# Patient Record
Sex: FEMALE | Race: WHITE | Employment: OTHER
[De-identification: names, ages, dates, MRNs, and addresses within clinical notes are randomized per-mention and may not be internally consistent; named-entity substitution may affect disease eponyms.]

---

## 2017-02-01 ENCOUNTER — CARE COORDINATION (OUTPATIENT)
Dept: CARE COORDINATION | Facility: CLINIC | Age: 77
End: 2017-02-01

## 2017-03-17 ENCOUNTER — CARE COORDINATION (OUTPATIENT)
Dept: CARE COORDINATION | Age: 77
End: 2017-03-17

## 2017-04-28 ENCOUNTER — CARE COORDINATION (OUTPATIENT)
Dept: CARE COORDINATION | Age: 77
End: 2017-04-28

## 2017-05-31 ENCOUNTER — CARE COORDINATION (OUTPATIENT)
Dept: CARE COORDINATION | Age: 77
End: 2017-05-31

## 2017-07-06 ENCOUNTER — CARE COORDINATION (OUTPATIENT)
Dept: CARE COORDINATION | Age: 77
End: 2017-07-06

## 2017-07-27 ENCOUNTER — CARE COORDINATION (OUTPATIENT)
Dept: CARE COORDINATION | Age: 77
End: 2017-07-27

## 2017-08-03 ENCOUNTER — CARE COORDINATION (OUTPATIENT)
Dept: CARE COORDINATION | Age: 77
End: 2017-08-03

## 2017-08-18 ENCOUNTER — CARE COORDINATION (OUTPATIENT)
Dept: CARE COORDINATION | Age: 77
End: 2017-08-18

## 2017-08-18 RX ORDER — FUROSEMIDE 40 MG/1
40 TABLET ORAL DAILY
Status: ON HOLD | COMMUNITY
End: 2021-01-01

## 2017-08-18 RX ORDER — GLUCOSAMINE HCL/CHONDROITIN SU 500-400 MG
1 CAPSULE ORAL 4 TIMES DAILY
COMMUNITY
End: 2017-08-18 | Stop reason: SDUPTHER

## 2017-08-28 ENCOUNTER — CARE COORDINATION (OUTPATIENT)
Dept: CARE COORDINATION | Age: 77
End: 2017-08-28

## 2017-09-19 ENCOUNTER — CARE COORDINATION (OUTPATIENT)
Dept: CARE COORDINATION | Age: 77
End: 2017-09-19

## 2018-03-22 ENCOUNTER — CARE COORDINATION (OUTPATIENT)
Dept: CARE COORDINATION | Age: 78
End: 2018-03-22

## 2018-03-22 NOTE — CARE COORDINATION
at home,    what services are needed and what does she qualify for, or if she needs to go to assisted living. He is just concerned about after home health has discharged her what will happen because all family members work during the day, unable to keep up the schedule like they've been doing. I called and left a voicemail message asking the  at [facility] to call me back at [phone] to ask about  consult or if I should refer the [facility] care coordination LSW to follow her. CC Plan:   -Follow up with [facility]  -check on her specialists-cardiology and endocrinology, when next appts are. Diabetes Assessment    Medic Alert ID:  No  Meal Planning:  None   How often do you test your blood sugar?:  Meals, Bedtime   Do you have barriers with adherence to non-pharmacologic self-management interventions?  (Nutrition/Exercise/Self-Monitoring):  Yes (Comment: unable to exercise, may need diet instruction)   Have you ever had to go to the ED for symptoms of low blood sugar?:  No       Do you have hyperglycemia symptoms?:  No   Do you have hypoglycemia symptoms?:  No   Blood Sugar Monitoring Regimen:  Before Meals, At Bedtime   Blood Sugar Trends:  Other (Comment: improving)      ,   Congestive Heart Failure Assessment    Are you currently restricting fluids?:  No Restriction  Do you understand a low sodium diet?:  Yes  Do you understand how to read food labels?:  No  How many restaurant meals do you eat per week?:  0  Do you salt your food before tasting it?:  No         Symptoms:   CHF associated dyspnea on exertion: Pos, CHF associated leg swelling: Pos      Symptom course:  stable  Weight trend:  stable      and   General Assessment    Do you have any symptoms that are causing concern?:  Yes  Progression since Onset:  Gradually Improving  Reported Symptoms:  Cough           Ambulatory Care Coordination Assessment    Care Coordination Protocol  Program Enrollment:  Rising Risk  Referral

## 2018-03-23 ENCOUNTER — CARE COORDINATION (OUTPATIENT)
Dept: CARE COORDINATION | Age: 78
End: 2018-03-23

## 2018-03-23 RX ORDER — ALBUTEROL SULFATE 90 UG/1
2 AEROSOL, METERED RESPIRATORY (INHALATION) EVERY 6 HOURS PRN
Qty: 1 INHALER | Refills: 3 | Status: CANCELLED | OUTPATIENT
Start: 2018-03-23

## 2018-03-23 NOTE — CARE COORDINATION
A  from [facility] called and left a voicemail message on my phone giving me the name and phone number of Lori's home health nurse Florencio Jane. I called Florencio Jane today, gave her verbal order for  consult. She feels that Gopi Aldridge is not safe to live by herself, she is not retaining any of the education given to her regarding medications, she does not have the manual dexterity in her hands due to arthritis to use glucometer or check her blood sugars. She is very overwhelmed. Florencio Jane is wondering if speech therapy could be consulted to help with her memory and comprehension but Gopi [facility]ridge is refusing the physical therapy because there are already lots of people coming to her house every day which is also overwhelming and she might also refuse the speech therapy. She will visit Gopi Aldridge today and speak with the family, enter the SW consult. I called and spoke with Gop[facility] Aldridge. She is coughing frequently, having a hard time sleeping. She feels some chest congestion and is not able to cough any sputum up. She does not have a nebulizer yet. She finally got some sleep last night. She sleeps in a chair with her feet on a stool, is getting a lift chair delivered today so she can get out of it herself. Her bed is too high, she can't get in and out of it herself so sleeps in the chair. She is drinking and eating ok. Just feels exhausted from all the coughing. She stated she is trying hard to learn everything she can but she wants people to be more patient with her, it's only been a few weeks,  passed away March 1. She canceled her physical therapy appts because she is short of breath and just doesn't feel good but wants to start them next week. Denied any falls since being home, is using her walker. Her  drover her wherever she wanted to go. She doesn't drive and feels she will be stuck in the house because everyone is too busy and works to be able to take her places.  She is trying to do more around the house-dishes, laundry, etc.    I called and spoke with pharmacist at [facility], they do not provide nebulizers and the albuterol solution was denied by both the Medicare Part B and her [address] [place] [address] Street. She stated Part B usually covers the albuterol for diagnosis of chronic COPD or asthma, but not for temporary conditions like bronchospasm. She thinks the nebulizer order was sent to [facility] on [facility] since [facility] is closed. I called and left a message asking the rep at [facility] to call me back. I called and spoke with rep at [facility], she stated albuterol is only covered by Part B and I must speak to Medicare about it. I tried to call Medicare, was given the number for the client line, then told I need to call the provider line. I called the Medicare provider line and that department is closed for staff training, message instructed me to call back later, unable to leave a message. I called [facility] again and left another voicemail message on DME line for call back to me at [phone]. I sent notification of Medicare not covering the nebulizer and solution, order was changed to an inhaler with a spacer. I called [facility], patient's copay is $26.93. I left a voicemail message on son's phone informing him of new order and cost, also left a message on home health nurse Chiquita's phone regarding new order and that patient would need instructions and monitoring on correct use.     CC Plan:   -Follow up next week and assess bronchitis symptoms, use of inhaler, check blood sugars  -Communicate with home health agency regarding  consult    Future Appointments  Date Time Provider Jenni Fierro   4/6/2018 3:20 PM Dania Figueroa MD [facility] PCP

## 2018-03-27 ENCOUNTER — CARE COORDINATION (OUTPATIENT)
Dept: CARE COORDINATION | Age: 78
End: 2018-03-27

## 2018-03-27 RX ORDER — GUAIFENESIN 600 MG/1
1200 TABLET, EXTENDED RELEASE ORAL 2 TIMES DAILY
COMMUNITY
End: 2018-03-30 | Stop reason: ALTCHOICE

## 2018-03-27 RX ORDER — GUAIFENESIN 100 MG/5ML
200 SYRUP ORAL 3 TIMES DAILY PRN
COMMUNITY
End: 2018-03-30 | Stop reason: ALTCHOICE

## 2018-03-27 NOTE — CARE COORDINATION
adherence to non-pharmacologic self-management interventions? (Nutrition/Exercise/Self-Monitoring):  Yes (Comment: unable to exercise, may need diet instruction)   Have you ever had to go to the ED for symptoms of low blood sugar?:  No       Do you have hyperglycemia symptoms?:  No   Do you have hypoglycemia symptoms?:  No   Blood Sugar Monitoring Regimen:  Before Meals, At Bedtime   Blood Sugar Trends:  No Change      ,   Congestive Heart Failure Assessment    Are you currently restricting fluids?:  No Restriction  Do you understand a low sodium diet?:  Yes  Do you understand how to read food labels?:  No  How many restaurant meals do you eat per week?:  0  Do you salt your food before tasting it?:  No         Symptoms:   CHF associated dyspnea on exertion: Pos, CHF associated fatigue: Pos      Symptom course:  no change  Salt intake watch compared to last visit:  stable      and   General Assessment    Do you have any symptoms that are causing concern?:  Yes  Progression since Onset:  Unchanged  Reported Symptoms:  Cough, Fatigue, Pain (Comment: side pain from coughing)                 Care Coordination Interventions    Program Enrollment:  Rising Risk  Referral from Primary Care Provider:  Yes  Suggested Interventions and [address] [place] [address] Hwy:  Completed (Comment: [facility] care)  Meals on Wheels:  Declined  Medi Set or Pill Pack:  Declined  Pharmacist:  Declined  Physical Therapy:  Completed (Comment: [facility])  Social Work:  In Process (Comment:   [facility]  )  Zone Management Tools: In Process (Comment: DM)         Goals Addressed             Most Recent     Medication Management   On track (3/27/2018)             I will take my medication as directed. I will notify my provider of any problems with medications, like adverse effects or side effects. I will notify my provider/Care Coordinator if I am unable to afford my medications.   I will notify my provider pen INJECT 0-15 UNITS SUBCUTANEOUSLY FOUR TIMES DAILY BEFORE MEALS AND NIGHTLY 9/29/17  Yes Usama Raza MD   vitamin D (ERGOCALCIFEROL) 51651 UNITS CAPS capsule TAKE 1 CAPSULE BY MOUTH ONE TIME A WEEK  3/7/17  Yes Usama Raza MD   TRESIBA FLEXTOUCH 100 UNIT/ML SOPN 40 Units nightly Patient is taking 45 units nightly 1/12/17  Yes Historical Provider, MD   clopidogrel (PLAVIX) 75 MG tablet Take 1 tablet by mouth daily 2/13/17  Yes Usama Raza MD   metoprolol tartrate (LOPRESSOR) 25 MG tablet Take 0.5 tablets by mouth 2 times daily Indications: per Cardiologist's order 2/13/17  Yes Usama Raza MD   glipiZIDE (GLUCOTROL) 10 MG tablet Take 1 tablet by mouth every morning (before breakfast) 8/4/16  Yes Usama Raza MD   aspirin 81 MG chewable tablet Take 1 tablet by mouth daily 6/26/16  Yes Mahogany Fulton MD   B-D UF III MINI PEN NEEDLES 31G X 5 MM MISC INJECT SUBCUTANEOUSLY FOUR TIMES A DAY AS DIRECTED 2/19/18   Usama Raza MD   fluocinolone acetonide (SYNALAR) 0.01 % external solution Apply 1 each topically 2 times daily as needed 12/6/17   Historical Provider, MD   triamcinolone (KENALOG) 0.025 % ointment Apply 1 each topically 2 times daily as needed 12/7/17   Historical Provider, MD   clotrimazole (LOTRIMIN) 1 % cream Apply topically 2 times daily Apply topically 2 times daily.  8/21/17   Usama Raza MD   Glucose Blood (BLOOD GLUCOSE TEST STRIPS) STRP 1 strip by Does not apply route 4 times daily Indications: Mariam strips 8/21/17   Usama Raza MD   furosemide (LASIX) 40 MG tablet Take 40 mg by mouth daily Prescribed by Dr. Rochell Fabry Provider, MD [employer] (APPLE CIDER VINEGAR) TABS Take by mouth    Historical Provider, MD   loperamide (IMODIUM) 2 MG capsule Take 1 capsule by mouth as needed for Diarrhea 1/9/17   Usama Raza MD   Insulin Syringe-Needle U-100 (INSULIN SYRINGE .5CC/31GX5/16\") 31G

## 2018-03-29 ENCOUNTER — CARE COORDINATION (OUTPATIENT)
Dept: CARE COORDINATION | Age: 78
End: 2018-03-29

## 2018-03-30 ENCOUNTER — CARE COORDINATION (OUTPATIENT)
Dept: CARE COORDINATION | Age: 78
End: 2018-03-30

## 2018-03-30 RX ORDER — LORATADINE 10 MG/1
10 TABLET ORAL DAILY
Status: ON HOLD | COMMUNITY
End: 2018-11-01

## 2018-03-30 NOTE — CARE COORDINATION
Ambulatory Care Coordination Note  3/30/2018  CM Risk Score: 3  Varsha Mortality Risk Score: 19.51    ACC: Chandra Rodriguez RN    Summary Note: Spoke with Mio, she didn't know if she had started the antibiotics or not since her daughter in law Aleisha Barriga manages all of her medications. She assumes she did because she is feeling better today. Voice sounds stronger, less hoarse. She slept all night last night, is taking the cough syrup. She is getting a different lift chair so hopefully will be more comfortable and easier for her to get in and out of. She expectorated a small amount of phlegm this morning, brown, only the second time all week she's been able to cough it up. Encouraged fluids, she stated she is drinking a lot. Unable to review medications, she doesn't know what she takes, son is with her but he also doesn't know them, stated I would need to talk to Aleisha Barriga. Reminded her of PCP appt next week.     CC Plan:   - Follow next week to check on symptoms, blood sugars, medication management with family  - Check with home health next week to check progress with , future plans with family     Congestive Heart Failure Assessment    Are you currently restricting fluids?:  No Restriction  Do you understand a low sodium diet?:  Yes  Do you understand how to read food labels?:  No  How many restaurant meals do you eat per week?:  0  Do you salt your food before tasting it?:  No         Symptoms:   CHF associated dyspnea on exertion: Pos      Symptom course:  stable      and   General Assessment    Do you have any symptoms that are causing concern?:  Yes  Progression since Onset:  Gradually Improving  Reported Symptoms:  Cough             Care Coordination Interventions    Program Enrollment:  Rising Risk  Referral from Primary Care Provider:  Yes  Suggested Interventions and [address] [place] [address] Nikolayy:  Completed (Comment: [facility] health care)  Meals on Wheels:  Declined  Medi Set or Pill

## 2018-04-06 ENCOUNTER — CARE COORDINATION (OUTPATIENT)
Dept: CARE COORDINATION | Age: 78
End: 2018-04-06

## 2018-04-23 ENCOUNTER — CARE COORDINATION (OUTPATIENT)
Dept: CARE COORDINATION | Age: 78
End: 2018-04-23

## 2018-05-21 ENCOUNTER — CARE COORDINATION (OUTPATIENT)
Dept: CARE COORDINATION | Age: 78
End: 2018-05-21

## 2018-06-15 ENCOUNTER — CARE COORDINATION (OUTPATIENT)
Dept: CARE COORDINATION | Age: 78
End: 2018-06-15

## 2018-06-15 RX ORDER — GABAPENTIN 300 MG/1
300 CAPSULE ORAL DAILY
Status: ON HOLD | COMMUNITY
End: 2018-11-01 | Stop reason: ALTCHOICE

## 2018-07-17 ENCOUNTER — CARE COORDINATION (OUTPATIENT)
Dept: CARE COORDINATION | Age: 78
End: 2018-07-17

## 2018-07-17 NOTE — CARE COORDINATION
Insulin Syringe-Needle U-100 (INSULIN SYRINGE .5CC/31GX5/16\") 31G X 5/16\" 0.5 ML MISC 1 each by Does not apply route daily 11/4/16   Yanelis Matias MD   glipiZIDE (GLUCOTROL) 10 MG tablet Take 1 tablet by mouth every morning (before breakfast) 8/4/16   Yanelis Matias MD   aspirin 81 MG chewable tablet Take 1 tablet by mouth daily 6/26/16   Vivi Ashley MD       Future Appointments  Date Time Provider Jenni Fierro   8/8/2018 3:20 PM Yanelis Matias MD [facility] PCP

## 2018-08-08 ENCOUNTER — CARE COORDINATION (OUTPATIENT)
Dept: CARE COORDINATION | Age: 78
End: 2018-08-08

## 2018-08-08 ASSESSMENT — PATIENT HEALTH QUESTIONNAIRE - PHQ9: SUM OF ALL RESPONSES TO PHQ QUESTIONS 1-9: 3

## 2018-08-31 ENCOUNTER — CARE COORDINATION (OUTPATIENT)
Dept: CARE COORDINATION | Age: 78
End: 2018-08-31

## 2018-08-31 NOTE — CARE COORDINATION
Ambulatory Care Coordination Note  8/31/2018  CM Risk Score: 3  Varsha Mortality Risk Score: 19.51    ACC: Randy Waters RN    Summary Note: She is having a good day today. Has not had any falls since a month ago. Her left side is feeling better. She has been active going places, gets visits and calls from friends and her , her mood is better. Blood sugars are doing ok, didn't give any numbers. Recently saw the eye doctor. I called and requested a recent progress note from endocrinologist's office. Left lower leg swelling comes and goes, her breathing has not been labored lately. .  Diabetes Assessment    Medic Alert ID:  No  Meal Planning:  Avoidance of concentrated sweets   How often do you test your blood sugar?:  Meals, Bedtime   Do you have barriers with adherence to non-pharmacologic self-management interventions?  (Nutrition/Exercise/Self-Monitoring):  Yes (Comment: unable to exercise, may need diet instruction)   Have you ever had to go to the ED for symptoms of low blood sugar?:  No       Do you have hyperglycemia symptoms?:  No   Do you have hypoglycemia symptoms?:  No   Blood Sugar Monitoring Regimen:  Before Meals, At Bedtime   Blood Sugar Trends:  No Change      ,   Congestive Heart Failure Assessment    Are you currently restricting fluids?:  No Restriction  Do you understand a low sodium diet?:  Yes  Do you understand how to read food labels?:  No  How many restaurant meals do you eat per week?:  0  Do you salt your food before tasting it?:  No         Symptoms:   CHF associated dyspnea on exertion: Pos      Symptom course:  stable  Weight trend:  stable  Salt intake watch compared to last visit:  stable      and   General Assessment                   Care Coordination Interventions    Program Enrollment:  Rising Risk  Referral from Primary Care Provider:  Yes  Suggested Interventions and [facility] Claudio:  Completed (Comment: [facility] care- [facility])  Meals on

## 2018-10-10 ENCOUNTER — CARE COORDINATION (OUTPATIENT)
Dept: CARE COORDINATION | Age: 78
End: 2018-10-10

## 2018-10-10 NOTE — CARE COORDINATION
No  Progression since Onset:  Unchanged               Care Coordination Interventions    Program Enrollment:  Rising Risk  Referral from Primary Care Provider:  Yes  Suggested Interventions and Community Resources  Home Health Services:  Completed (Comment: [facility])  Meals on Wheels:  Declined  Medi Set or Pill Pack:  Declined  Pharmacist:  Declined  Physical Therapy:  Completed (Comment: [facility])  Social Work:  Completed (Comment:   [facility]  )  Other Services:  Completed (Comment: [facility])  Zone Management Tools:  Completed (Comment: DM)         Goals Addressed     None          Prior to Admission medications    Medication Sig Start Date End Date Taking? Authorizing Provider   spironolactone (ALDACTONE) 25 MG tablet TAKE 1 TABLET DAILY 7/25/18   Neeta Cunningham PA-C   simvastatin (ZOCOR) 20 MG tablet TAKE 1 TABLET NIGHTLY 7/25/18   Neeta Cunningham PA-C   gabapentin (NEURONTIN) 300 MG capsule Take 300 mg by mouth daily. Tangela Barajas     Historical Provider, MD   Multiple Vitamins-Minerals (MULTIVITAMIN WOMEN PO) Take by mouth    Historical Provider, MD   APIDRA SOLOSTAR 100 UNIT/ML injection pen INJECT 0 TO 15 UNITS SUBCUTANEOUSLY AT FOUR TIMES A DAY (BEFORE MEALS AND NIGHTLY) 6/4/18   Mauricio Liao MD   levETIRAcetam (KEPPRA) 1000 MG tablet Take 1 tablet by mouth 2 times daily 4/30/18 6/15/18  Mauricio Liao MD   Dextromethorphan-Guaifenesin (DELSYM COUGH/CHEST CONGEST DM PO) Take by mouth    Historical Provider, MD   loratadine (CLARITIN) 10 MG tablet Take 10 mg by mouth daily    Historical Provider, MD   albuterol sulfate HFA (PROVENTIL HFA) 108 (90 Base) MCG/ACT inhaler Inhale 2 puffs into the lungs every 6 hours as needed for Shortness of Breath (cough) 3/23/18   Mauricio Liao MD   B-D UF III MINI PEN NEEDLES 31G X 5 MM MISC INJECT SUBCUTANEOUSLY FOUR TIMES A DAY AS DIRECTED 2/19/18   Mauricio Liao MD   calcium aspirin 81 MG chewable tablet Take 1 tablet by mouth daily 6/26/16   Leopold Berber, MD       Future Appointments  Date Time Provider Jenni Sri   11/9/2018 2:00 PM [facility]itt [facility] [address] [place] [address]

## 2018-11-01 ENCOUNTER — HOSPITAL ENCOUNTER (OUTPATIENT)
Age: 78
Setting detail: OUTPATIENT SURGERY
Discharge: HOME | End: 2018-11-01
Attending: OPHTHALMOLOGY | Admitting: OPHTHALMOLOGY
Payer: MEDICARE

## 2018-11-01 VITALS
SYSTOLIC BLOOD PRESSURE: 105 MMHG | HEART RATE: 78 BPM | DIASTOLIC BLOOD PRESSURE: 50 MMHG | OXYGEN SATURATION: 96 % | HEIGHT: 61 IN | TEMPERATURE: 97.9 F | WEIGHT: 235 LBS | BODY MASS INDEX: 44.37 KG/M2 | RESPIRATION RATE: 16 BRPM

## 2018-11-01 PROBLEM — H25.12 AGE-RELATED NUCLEAR CATARACT, LEFT: Status: RESOLVED | Noted: 2018-11-01 | Resolved: 2018-11-01

## 2018-11-01 LAB
GLUCOSE BLD-MCNC: 115 MG/DL (ref 65–105)
GLUCOSE BLD-MCNC: 125 MG/DL (ref 65–105)

## 2018-11-01 PROCEDURE — 99153 MOD SED SAME PHYS/QHP EA: CPT | Performed by: OPHTHALMOLOGY

## 2018-11-01 PROCEDURE — 3600000012 HC SURGERY LEVEL 2 ADDTL 15MIN: Performed by: OPHTHALMOLOGY

## 2018-11-01 PROCEDURE — 2709999900 HC NON-CHARGEABLE SUPPLY: Performed by: OPHTHALMOLOGY

## 2018-11-01 PROCEDURE — 2580000003 HC RX 258: Performed by: OPHTHALMOLOGY

## 2018-11-01 PROCEDURE — 2500000003 HC RX 250 WO HCPCS: Performed by: OPHTHALMOLOGY

## 2018-11-01 PROCEDURE — 6370000000 HC RX 637 (ALT 250 FOR IP): Performed by: OPHTHALMOLOGY

## 2018-11-01 PROCEDURE — 3600000002 HC SURGERY LEVEL 2 BASE: Performed by: OPHTHALMOLOGY

## 2018-11-01 PROCEDURE — 6360000002 HC RX W HCPCS: Performed by: OPHTHALMOLOGY

## 2018-11-01 PROCEDURE — 99152 MOD SED SAME PHYS/QHP 5/>YRS: CPT | Performed by: OPHTHALMOLOGY

## 2018-11-01 PROCEDURE — 82947 ASSAY GLUCOSE BLOOD QUANT: CPT

## 2018-11-01 PROCEDURE — 7100000011 HC PHASE II RECOVERY - ADDTL 15 MIN: Performed by: OPHTHALMOLOGY

## 2018-11-01 PROCEDURE — 7100000010 HC PHASE II RECOVERY - FIRST 15 MIN: Performed by: OPHTHALMOLOGY

## 2018-11-01 PROCEDURE — V2632 POST CHMBR INTRAOCULAR LENS: HCPCS | Performed by: OPHTHALMOLOGY

## 2018-11-01 DEVICE — LENS IOL SN60WF 23.5D: Type: IMPLANTABLE DEVICE | Site: EYE | Status: FUNCTIONAL

## 2018-11-01 RX ORDER — SODIUM CHLORIDE, SODIUM LACTATE, POTASSIUM CHLORIDE, CALCIUM CHLORIDE 600; 310; 30; 20 MG/100ML; MG/100ML; MG/100ML; MG/100ML
INJECTION, SOLUTION INTRAVENOUS CONTINUOUS
Status: DISCONTINUED | OUTPATIENT
Start: 2018-11-01 | End: 2018-11-01 | Stop reason: HOSPADM

## 2018-11-01 RX ORDER — LIDOCAINE HYDROCHLORIDE 10 MG/ML
INJECTION, SOLUTION INFILTRATION; PERINEURAL PRN
Status: DISCONTINUED | OUTPATIENT
Start: 2018-11-01 | End: 2018-11-01 | Stop reason: HOSPADM

## 2018-11-01 RX ORDER — LORAZEPAM 1 MG/1
1 TABLET ORAL ONCE
Status: COMPLETED | OUTPATIENT
Start: 2018-11-01 | End: 2018-11-01

## 2018-11-01 RX ORDER — MIDAZOLAM HYDROCHLORIDE 1 MG/ML
INJECTION INTRAMUSCULAR; INTRAVENOUS PRN
Status: DISCONTINUED | OUTPATIENT
Start: 2018-11-01 | End: 2018-11-01 | Stop reason: HOSPADM

## 2018-11-01 RX ORDER — NEOMYCIN SULFATE, POLYMYXIN B SULFATE, AND DEXAMETHASONE 3.5; 10000; 1 MG/G; [USP'U]/G; MG/G
OINTMENT OPHTHALMIC PRN
Status: DISCONTINUED | OUTPATIENT
Start: 2018-11-01 | End: 2018-11-01 | Stop reason: HOSPADM

## 2018-11-01 RX ORDER — TOBRAMYCIN 3 MG/ML
1 SOLUTION/ DROPS OPHTHALMIC EVERY 5 MIN PRN
Status: COMPLETED | OUTPATIENT
Start: 2018-11-01 | End: 2018-11-01

## 2018-11-01 RX ORDER — BALANCED SALT SOLUTION ENRICHED WITH BICARBONATE, DEXTROSE, AND GLUTATHIONE
KIT INTRAOCULAR PRN
Status: DISCONTINUED | OUTPATIENT
Start: 2018-11-01 | End: 2018-11-01 | Stop reason: HOSPADM

## 2018-11-01 RX ORDER — DIFLUPREDNATE 0.5 MG/ML
EMULSION OPHTHALMIC PRN
Status: DISCONTINUED | OUTPATIENT
Start: 2018-11-01 | End: 2018-11-01 | Stop reason: HOSPADM

## 2018-11-01 RX ORDER — BUPIVACAINE HYDROCHLORIDE 5 MG/ML
INJECTION, SOLUTION EPIDURAL; INTRACAUDAL PRN
Status: DISCONTINUED | OUTPATIENT
Start: 2018-11-01 | End: 2018-11-01 | Stop reason: HOSPADM

## 2018-11-01 RX ORDER — PHENYLEPHRINE HCL 2.5 %
1 DROPS OPHTHALMIC (EYE) EVERY 5 MIN PRN
Status: COMPLETED | OUTPATIENT
Start: 2018-11-01 | End: 2018-11-01

## 2018-11-01 RX ORDER — CYCLOPENTOLATE HYDROCHLORIDE 10 MG/ML
1 SOLUTION/ DROPS OPHTHALMIC EVERY 5 MIN PRN
Status: COMPLETED | OUTPATIENT
Start: 2018-11-01 | End: 2018-11-01

## 2018-11-01 RX ORDER — KETOROLAC TROMETHAMINE 5 MG/ML
1 SOLUTION OPHTHALMIC EVERY 5 MIN PRN
Status: COMPLETED | OUTPATIENT
Start: 2018-11-01 | End: 2018-11-01

## 2018-11-01 RX ORDER — BUPIVACAINE HYDROCHLORIDE 5 MG/ML
1 INJECTION, SOLUTION EPIDURAL; INTRACAUDAL SEE ADMIN INSTRUCTIONS
Status: DISCONTINUED | OUTPATIENT
Start: 2018-11-01 | End: 2018-11-01 | Stop reason: HOSPADM

## 2018-11-01 RX ORDER — TOBRAMYCIN 3 MG/ML
SOLUTION/ DROPS OPHTHALMIC PRN
Status: DISCONTINUED | OUTPATIENT
Start: 2018-11-01 | End: 2018-11-01 | Stop reason: HOSPADM

## 2018-11-01 RX ADMIN — KETOROLAC TROMETHAMINE 1 DROP: 5 SOLUTION OPHTHALMIC at 08:07

## 2018-11-01 RX ADMIN — KETOROLAC TROMETHAMINE 1 DROP: 5 SOLUTION OPHTHALMIC at 07:59

## 2018-11-01 RX ADMIN — LORAZEPAM 1 MG: 1 TABLET ORAL at 07:56

## 2018-11-01 RX ADMIN — BUPIVACAINE HYDROCHLORIDE 5 MG: 5 INJECTION, SOLUTION EPIDURAL; INTRACAUDAL; PERINEURAL at 08:06

## 2018-11-01 RX ADMIN — CYCLOPENTOLATE HYDROCHLORIDE 1 DROP: 10 SOLUTION/ DROPS OPHTHALMIC at 07:59

## 2018-11-01 RX ADMIN — CYCLOPENTOLATE HYDROCHLORIDE 1 DROP: 10 SOLUTION/ DROPS OPHTHALMIC at 08:07

## 2018-11-01 RX ADMIN — TOBRAMYCIN 1 DROP: 3 SOLUTION/ DROPS OPHTHALMIC at 08:07

## 2018-11-01 RX ADMIN — BUPIVACAINE HYDROCHLORIDE 5 MG: 5 INJECTION, SOLUTION EPIDURAL; INTRACAUDAL; PERINEURAL at 07:58

## 2018-11-01 RX ADMIN — PHENYLEPHRINE HYDROCHLORIDE 1 DROP: 25 SOLUTION/ DROPS OPHTHALMIC at 08:07

## 2018-11-01 RX ADMIN — TOBRAMYCIN 1 DROP: 3 SOLUTION/ DROPS OPHTHALMIC at 07:59

## 2018-11-01 RX ADMIN — SODIUM CHLORIDE, POTASSIUM CHLORIDE, SODIUM LACTATE AND CALCIUM CHLORIDE: 600; 310; 30; 20 INJECTION, SOLUTION INTRAVENOUS at 08:08

## 2018-11-01 RX ADMIN — PHENYLEPHRINE HYDROCHLORIDE 1 DROP: 25 SOLUTION/ DROPS OPHTHALMIC at 07:59

## 2018-11-01 ASSESSMENT — PAIN SCALES - GENERAL
PAINLEVEL_OUTOF10: 0

## 2018-11-01 ASSESSMENT — PAIN - FUNCTIONAL ASSESSMENT: PAIN_FUNCTIONAL_ASSESSMENT: 0-10

## 2019-01-28 RX ORDER — LEVETIRACETAM 1000 MG/1
1000 TABLET ORAL 2 TIMES DAILY
Qty: 60 TABLET | Refills: 3 | Status: SHIPPED | OUTPATIENT
Start: 2019-01-28 | End: 2019-01-31 | Stop reason: SDUPTHER

## 2019-01-31 RX ORDER — LEVETIRACETAM 1000 MG/1
1000 TABLET ORAL 2 TIMES DAILY
Qty: 180 TABLET | Refills: 3 | Status: SHIPPED | OUTPATIENT
Start: 2019-01-31 | End: 2019-11-15

## 2019-02-22 ENCOUNTER — OFFICE VISIT (OUTPATIENT)
Dept: PRIMARY CARE CLINIC | Age: 79
End: 2019-02-22
Payer: MEDICARE

## 2019-02-22 VITALS
OXYGEN SATURATION: 96 % | HEIGHT: 61 IN | WEIGHT: 242.4 LBS | SYSTOLIC BLOOD PRESSURE: 104 MMHG | HEART RATE: 88 BPM | DIASTOLIC BLOOD PRESSURE: 60 MMHG | BODY MASS INDEX: 45.76 KG/M2

## 2019-02-22 DIAGNOSIS — E66.01 MORBID OBESITY WITH BMI OF 45.0-49.9, ADULT (HCC): ICD-10-CM

## 2019-02-22 DIAGNOSIS — K64.9 HEMORRHOIDS, UNSPECIFIED HEMORRHOID TYPE: ICD-10-CM

## 2019-02-22 DIAGNOSIS — E11.43 DIABETIC AUTONOMIC NEUROPATHY ASSOCIATED WITH TYPE 2 DIABETES MELLITUS (HCC): ICD-10-CM

## 2019-02-22 DIAGNOSIS — G40.909 SEIZURE DISORDER (HCC): ICD-10-CM

## 2019-02-22 DIAGNOSIS — I50.32 CHRONIC DIASTOLIC CHF (CONGESTIVE HEART FAILURE) (HCC): Primary | ICD-10-CM

## 2019-02-22 DIAGNOSIS — E11.65 UNCONTROLLED TYPE 2 DIABETES MELLITUS WITH HYPERGLYCEMIA (HCC): ICD-10-CM

## 2019-02-22 PROCEDURE — 99214 OFFICE O/P EST MOD 30 MIN: CPT | Performed by: FAMILY MEDICINE

## 2019-02-22 ASSESSMENT — PATIENT HEALTH QUESTIONNAIRE - PHQ9
SUM OF ALL RESPONSES TO PHQ QUESTIONS 1-9: 2
SUM OF ALL RESPONSES TO PHQ QUESTIONS 1-9: 2
2. FEELING DOWN, DEPRESSED OR HOPELESS: 1
SUM OF ALL RESPONSES TO PHQ9 QUESTIONS 1 & 2: 2
1. LITTLE INTEREST OR PLEASURE IN DOING THINGS: 1

## 2019-02-22 ASSESSMENT — ENCOUNTER SYMPTOMS
BLOOD IN STOOL: 0
CONSTIPATION: 1
RECTAL PAIN: 0
DIARRHEA: 1
ANAL BLEEDING: 1
SHORTNESS OF BREATH: 1

## 2019-03-05 ENCOUNTER — HOSPITAL ENCOUNTER (OUTPATIENT)
Age: 79
Setting detail: OUTPATIENT SURGERY
Discharge: HOME | End: 2019-03-05
Attending: OPHTHALMOLOGY | Admitting: OPHTHALMOLOGY
Payer: MEDICARE

## 2019-03-05 VITALS
OXYGEN SATURATION: 97 % | RESPIRATION RATE: 16 BRPM | HEART RATE: 72 BPM | SYSTOLIC BLOOD PRESSURE: 110 MMHG | DIASTOLIC BLOOD PRESSURE: 62 MMHG | TEMPERATURE: 97.5 F | WEIGHT: 242 LBS | HEIGHT: 60 IN | BODY MASS INDEX: 47.51 KG/M2

## 2019-03-05 PROBLEM — H25.11 AGE-RELATED NUCLEAR CATARACT, RIGHT: Status: RESOLVED | Noted: 2019-03-05 | Resolved: 2019-03-05

## 2019-03-05 LAB — GLUCOSE BLD-MCNC: 105 MG/DL (ref 65–105)

## 2019-03-05 PROCEDURE — 6370000000 HC RX 637 (ALT 250 FOR IP): Performed by: OPHTHALMOLOGY

## 2019-03-05 PROCEDURE — 7100000010 HC PHASE II RECOVERY - FIRST 15 MIN: Performed by: OPHTHALMOLOGY

## 2019-03-05 PROCEDURE — 99153 MOD SED SAME PHYS/QHP EA: CPT | Performed by: OPHTHALMOLOGY

## 2019-03-05 PROCEDURE — 6360000002 HC RX W HCPCS: Performed by: OPHTHALMOLOGY

## 2019-03-05 PROCEDURE — 3600000002 HC SURGERY LEVEL 2 BASE: Performed by: OPHTHALMOLOGY

## 2019-03-05 PROCEDURE — 82947 ASSAY GLUCOSE BLOOD QUANT: CPT

## 2019-03-05 PROCEDURE — 2500000003 HC RX 250 WO HCPCS: Performed by: OPHTHALMOLOGY

## 2019-03-05 PROCEDURE — 2580000003 HC RX 258: Performed by: OPHTHALMOLOGY

## 2019-03-05 PROCEDURE — 99152 MOD SED SAME PHYS/QHP 5/>YRS: CPT | Performed by: OPHTHALMOLOGY

## 2019-03-05 PROCEDURE — 3600000012 HC SURGERY LEVEL 2 ADDTL 15MIN: Performed by: OPHTHALMOLOGY

## 2019-03-05 PROCEDURE — 7100000011 HC PHASE II RECOVERY - ADDTL 15 MIN: Performed by: OPHTHALMOLOGY

## 2019-03-05 PROCEDURE — 2709999900 HC NON-CHARGEABLE SUPPLY: Performed by: OPHTHALMOLOGY

## 2019-03-05 PROCEDURE — V2632 POST CHMBR INTRAOCULAR LENS: HCPCS | Performed by: OPHTHALMOLOGY

## 2019-03-05 DEVICE — LENS IOL SN60WF 23.5D: Type: IMPLANTABLE DEVICE | Site: EYE | Status: FUNCTIONAL

## 2019-03-05 RX ORDER — LORAZEPAM 1 MG/1
1 TABLET ORAL ONCE
Status: COMPLETED | OUTPATIENT
Start: 2019-03-05 | End: 2019-03-05

## 2019-03-05 RX ORDER — TIMOLOL MALEATE 5 MG/ML
SOLUTION/ DROPS OPHTHALMIC PRN
Status: DISCONTINUED | OUTPATIENT
Start: 2019-03-05 | End: 2019-03-05 | Stop reason: ALTCHOICE

## 2019-03-05 RX ORDER — TOBRAMYCIN 3 MG/ML
1 SOLUTION/ DROPS OPHTHALMIC EVERY 5 MIN PRN
Status: COMPLETED | OUTPATIENT
Start: 2019-03-05 | End: 2019-03-05

## 2019-03-05 RX ORDER — DIFLUPREDNATE 0.5 MG/ML
EMULSION OPHTHALMIC PRN
Status: DISCONTINUED | OUTPATIENT
Start: 2019-03-05 | End: 2019-03-05 | Stop reason: ALTCHOICE

## 2019-03-05 RX ORDER — KETOROLAC TROMETHAMINE 5 MG/ML
1 SOLUTION OPHTHALMIC EVERY 5 MIN PRN
Status: COMPLETED | OUTPATIENT
Start: 2019-03-05 | End: 2019-03-05

## 2019-03-05 RX ORDER — BUPIVACAINE HYDROCHLORIDE 5 MG/ML
1 INJECTION, SOLUTION EPIDURAL; INTRACAUDAL SEE ADMIN INSTRUCTIONS
Status: DISCONTINUED | OUTPATIENT
Start: 2019-03-05 | End: 2019-03-05 | Stop reason: HOSPADM

## 2019-03-05 RX ORDER — NEOMYCIN SULFATE, POLYMYXIN B SULFATE, AND DEXAMETHASONE 3.5; 10000; 1 MG/G; [USP'U]/G; MG/G
OINTMENT OPHTHALMIC PRN
Status: DISCONTINUED | OUTPATIENT
Start: 2019-03-05 | End: 2019-03-05 | Stop reason: ALTCHOICE

## 2019-03-05 RX ORDER — BUPIVACAINE HYDROCHLORIDE 5 MG/ML
INJECTION, SOLUTION EPIDURAL; INTRACAUDAL PRN
Status: DISCONTINUED | OUTPATIENT
Start: 2019-03-05 | End: 2019-03-05 | Stop reason: ALTCHOICE

## 2019-03-05 RX ORDER — MIDAZOLAM HYDROCHLORIDE 1 MG/ML
INJECTION INTRAMUSCULAR; INTRAVENOUS PRN
Status: DISCONTINUED | OUTPATIENT
Start: 2019-03-05 | End: 2019-03-05 | Stop reason: ALTCHOICE

## 2019-03-05 RX ORDER — TOBRAMYCIN 3 MG/ML
SOLUTION/ DROPS OPHTHALMIC PRN
Status: DISCONTINUED | OUTPATIENT
Start: 2019-03-05 | End: 2019-03-05 | Stop reason: ALTCHOICE

## 2019-03-05 RX ORDER — SODIUM CHLORIDE, SODIUM LACTATE, POTASSIUM CHLORIDE, CALCIUM CHLORIDE 600; 310; 30; 20 MG/100ML; MG/100ML; MG/100ML; MG/100ML
INJECTION, SOLUTION INTRAVENOUS CONTINUOUS
Status: DISCONTINUED | OUTPATIENT
Start: 2019-03-05 | End: 2019-03-05 | Stop reason: HOSPADM

## 2019-03-05 RX ORDER — PHENYLEPHRINE HCL 2.5 %
1 DROPS OPHTHALMIC (EYE) EVERY 5 MIN PRN
Status: COMPLETED | OUTPATIENT
Start: 2019-03-05 | End: 2019-03-05

## 2019-03-05 RX ORDER — CYCLOPENTOLATE HYDROCHLORIDE 10 MG/ML
1 SOLUTION/ DROPS OPHTHALMIC EVERY 5 MIN PRN
Status: COMPLETED | OUTPATIENT
Start: 2019-03-05 | End: 2019-03-05

## 2019-03-05 RX ORDER — LIDOCAINE HYDROCHLORIDE 10 MG/ML
INJECTION, SOLUTION INFILTRATION; PERINEURAL PRN
Status: DISCONTINUED | OUTPATIENT
Start: 2019-03-05 | End: 2019-03-05 | Stop reason: ALTCHOICE

## 2019-03-05 RX ORDER — BALANCED SALT SOLUTION ENRICHED WITH BICARBONATE, DEXTROSE, AND GLUTATHIONE
KIT INTRAOCULAR PRN
Status: DISCONTINUED | OUTPATIENT
Start: 2019-03-05 | End: 2019-03-05 | Stop reason: ALTCHOICE

## 2019-03-05 RX ADMIN — KETOROLAC TROMETHAMINE 1 DROP: 5 SOLUTION/ DROPS OPHTHALMIC at 06:53

## 2019-03-05 RX ADMIN — CYCLOPENTOLATE HYDROCHLORIDE 1 DROP: 10 SOLUTION/ DROPS OPHTHALMIC at 06:53

## 2019-03-05 RX ADMIN — TOBRAMYCIN 1 DROP: 3 SOLUTION/ DROPS OPHTHALMIC at 06:55

## 2019-03-05 RX ADMIN — BUPIVACAINE HYDROCHLORIDE 5 MG: 5 INJECTION, SOLUTION EPIDURAL; INTRACAUDAL; PERINEURAL at 06:54

## 2019-03-05 RX ADMIN — CYCLOPENTOLATE HYDROCHLORIDE 1 DROP: 10 SOLUTION/ DROPS OPHTHALMIC at 07:06

## 2019-03-05 RX ADMIN — LORAZEPAM 1 MG: 1 TABLET ORAL at 06:53

## 2019-03-05 RX ADMIN — PHENYLEPHRINE HYDROCHLORIDE 1 DROP: 25 SOLUTION/ DROPS OPHTHALMIC at 07:06

## 2019-03-05 RX ADMIN — TOBRAMYCIN 1 DROP: 3 SOLUTION/ DROPS OPHTHALMIC at 07:06

## 2019-03-05 RX ADMIN — KETOROLAC TROMETHAMINE 1 DROP: 5 SOLUTION/ DROPS OPHTHALMIC at 07:06

## 2019-03-05 RX ADMIN — PHENYLEPHRINE HYDROCHLORIDE 1 DROP: 25 SOLUTION/ DROPS OPHTHALMIC at 06:53

## 2019-03-05 RX ADMIN — SODIUM CHLORIDE, POTASSIUM CHLORIDE, SODIUM LACTATE AND CALCIUM CHLORIDE: 600; 310; 30; 20 INJECTION, SOLUTION INTRAVENOUS at 07:16

## 2019-03-05 RX ADMIN — BUPIVACAINE HYDROCHLORIDE 5 MG: 5 INJECTION, SOLUTION EPIDURAL; INTRACAUDAL; PERINEURAL at 07:06

## 2019-03-05 ASSESSMENT — PAIN - FUNCTIONAL ASSESSMENT: PAIN_FUNCTIONAL_ASSESSMENT: 0-10

## 2019-03-05 ASSESSMENT — PAIN SCALES - GENERAL
PAINLEVEL_OUTOF10: 0
PAINLEVEL_OUTOF10: 5
PAINLEVEL_OUTOF10: 5

## 2019-03-05 ASSESSMENT — PAIN DESCRIPTION - DESCRIPTORS: DESCRIPTORS: ACHING

## 2019-03-11 LAB
ALBUMIN SERPL-MCNC: NORMAL G/DL
ALP BLD-CCNC: NORMAL U/L
ALT SERPL-CCNC: NORMAL U/L
ANION GAP SERPL CALCULATED.3IONS-SCNC: NORMAL MMOL/L
AST SERPL-CCNC: NORMAL U/L
AVERAGE GLUCOSE: 150
BILIRUB SERPL-MCNC: NORMAL MG/DL
BUN BLDV-MCNC: NORMAL MG/DL
CALCIUM SERPL-MCNC: NORMAL MG/DL
CHLORIDE BLD-SCNC: NORMAL MMOL/L
CHOLESTEROL, TOTAL: 157 MG/DL
CHOLESTEROL/HDL RATIO: 3.8
CO2: NORMAL
CREAT SERPL-MCNC: NORMAL MG/DL
CREATININE, URINE: NORMAL
GFR CALCULATED: NORMAL
GLUCOSE BLD-MCNC: 84 MG/DL
HBA1C MFR BLD: 6.8 %
HDLC SERPL-MCNC: 41 MG/DL (ref 35–70)
LDL CHOLESTEROL CALCULATED: 88 MG/DL (ref 0–160)
MICROALBUMIN/CREAT 24H UR: NORMAL MG/G{CREAT}
MICROALBUMIN/CREAT UR-RTO: NORMAL
POTASSIUM SERPL-SCNC: NORMAL MMOL/L
SODIUM BLD-SCNC: NORMAL MMOL/L
TOTAL PROTEIN: NORMAL
TRIGL SERPL-MCNC: 137 MG/DL
VLDLC SERPL CALC-MCNC: 27 MG/DL

## 2019-03-20 RX ORDER — SPIRONOLACTONE 25 MG/1
TABLET ORAL
Qty: 90 TABLET | Refills: 3 | Status: SHIPPED | OUTPATIENT
Start: 2019-03-20 | End: 2020-03-23

## 2019-03-28 RX ORDER — INSULIN GLULISINE 100 [IU]/ML
INJECTION, SOLUTION SUBCUTANEOUS
Qty: 15 ML | Refills: 3 | Status: SHIPPED | OUTPATIENT
Start: 2019-03-28 | End: 2020-02-04 | Stop reason: DRUGHIGH

## 2019-05-08 ENCOUNTER — TELEPHONE (OUTPATIENT)
Dept: PRIMARY CARE CLINIC | Age: 79
End: 2019-05-08

## 2019-05-08 RX ORDER — CLOTRIMAZOLE 1 %
CREAM (GRAM) TOPICAL 2 TIMES DAILY
Qty: 180 G | Refills: 1 | Status: SHIPPED | OUTPATIENT
Start: 2019-05-08 | End: 2019-11-15 | Stop reason: ALTCHOICE

## 2019-05-08 NOTE — TELEPHONE ENCOUNTER
Message received from QUARTER[place] for something to help patient who has  \"heat rash\" under breasts.   [phone]

## 2019-05-08 NOTE — TELEPHONE ENCOUNTER
Belkis England called back, pt gets rash every summer, usually gets something to dry it up. Tristen Caitlyn on [address].

## 2019-06-07 ENCOUNTER — TELEPHONE (OUTPATIENT)
Dept: PRIMARY CARE CLINIC | Age: 79
End: 2019-06-07

## 2019-06-07 DIAGNOSIS — E11.43 DIABETIC AUTONOMIC NEUROPATHY ASSOCIATED WITH TYPE 2 DIABETES MELLITUS (HCC): ICD-10-CM

## 2019-06-07 DIAGNOSIS — E11.65 UNCONTROLLED TYPE 2 DIABETES MELLITUS WITH HYPERGLYCEMIA (HCC): ICD-10-CM

## 2019-06-07 DIAGNOSIS — I61.9 HEMORRHAGIC CEREBROVASCULAR ACCIDENT (CVA) (HCC): Primary | ICD-10-CM

## 2019-06-20 ENCOUNTER — HOSPITAL ENCOUNTER (OUTPATIENT)
Age: 79
Discharge: HOME | End: 2019-06-20
Payer: MEDICARE

## 2019-06-20 LAB
ALBUMIN SERPL-MCNC: 3.9 G/DL (ref 3.5–5.2)
ALBUMIN/GLOBULIN RATIO: ABNORMAL (ref 1–2.5)
ALP BLD-CCNC: 86 U/L (ref 35–104)
ALT SERPL-CCNC: 14 U/L (ref 5–33)
ANION GAP SERPL CALCULATED.3IONS-SCNC: 11 MMOL/L (ref 9–17)
AST SERPL-CCNC: 18 U/L
BILIRUB SERPL-MCNC: 0.29 MG/DL (ref 0.3–1.2)
BUN BLDV-MCNC: 31 MG/DL (ref 8–23)
BUN/CREAT BLD: ABNORMAL (ref 9–20)
CALCIUM SERPL-MCNC: 10 MG/DL (ref 8.6–10.4)
CHLORIDE BLD-SCNC: 101 MMOL/L (ref 98–107)
CO2: 31 MMOL/L (ref 20–31)
CREAT SERPL-MCNC: 1.16 MG/DL (ref 0.5–0.9)
GFR AFRICAN AMERICAN: 54 ML/MIN
GFR NON-AFRICAN AMERICAN: 45 ML/MIN
GFR SERPL CREATININE-BSD FRML MDRD: ABNORMAL ML/MIN/{1.73_M2}
GFR SERPL CREATININE-BSD FRML MDRD: ABNORMAL ML/MIN/{1.73_M2}
GLUCOSE BLD-MCNC: 118 MG/DL (ref 70–99)
POTASSIUM SERPL-SCNC: 4.7 MMOL/L (ref 3.7–5.3)
SODIUM BLD-SCNC: 143 MMOL/L (ref 135–144)
TOTAL PROTEIN: 7.8 G/DL (ref 6.4–8.3)

## 2019-06-20 PROCEDURE — 36415 COLL VENOUS BLD VENIPUNCTURE: CPT

## 2019-06-20 PROCEDURE — 80053 COMPREHEN METABOLIC PANEL: CPT

## 2019-07-01 DIAGNOSIS — E78.5 HYPERLIPIDEMIA, UNSPECIFIED HYPERLIPIDEMIA TYPE: ICD-10-CM

## 2019-07-01 RX ORDER — SIMVASTATIN 20 MG
TABLET ORAL
Qty: 90 TABLET | Refills: 3 | Status: SHIPPED | OUTPATIENT
Start: 2019-07-01 | End: 2020-06-29

## 2019-07-17 ENCOUNTER — APPOINTMENT (OUTPATIENT)
Dept: GENERAL RADIOLOGY | Age: 79
End: 2019-07-17
Payer: MEDICARE

## 2019-07-17 ENCOUNTER — APPOINTMENT (OUTPATIENT)
Dept: CT IMAGING | Age: 79
End: 2019-07-17
Payer: MEDICARE

## 2019-07-17 ENCOUNTER — HOSPITAL ENCOUNTER (EMERGENCY)
Age: 79
Discharge: HOME | End: 2019-07-17
Attending: EMERGENCY MEDICINE
Payer: MEDICARE

## 2019-07-17 VITALS
WEIGHT: 240 LBS | TEMPERATURE: 97.7 F | DIASTOLIC BLOOD PRESSURE: 55 MMHG | HEART RATE: 78 BPM | BODY MASS INDEX: 44.16 KG/M2 | RESPIRATION RATE: 18 BRPM | OXYGEN SATURATION: 98 % | SYSTOLIC BLOOD PRESSURE: 91 MMHG | HEIGHT: 62 IN

## 2019-07-17 DIAGNOSIS — M79.605 BILATERAL LEG PAIN: Primary | ICD-10-CM

## 2019-07-17 DIAGNOSIS — M79.604 BILATERAL LEG PAIN: Primary | ICD-10-CM
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
## 2019-07-17 LAB
-: NORMAL
ABSOLUTE EOS #: 0.1 K/UL (ref 0–0.4)
ABSOLUTE IMMATURE GRANULOCYTE: ABNORMAL K/UL (ref 0–0.3)
ABSOLUTE LYMPH #: 1.6 K/UL (ref 1–4.8)
ABSOLUTE MONO #: 0.7 K/UL (ref 0.1–1.3)
ANION GAP SERPL CALCULATED.3IONS-SCNC: 12 MMOL/L (ref 9–17)
BASOPHILS # BLD: 1 % (ref 0–2)
BASOPHILS ABSOLUTE: 0.1 K/UL (ref 0–0.2)
BUN BLDV-MCNC: 26 MG/DL (ref 8–23)
BUN/CREAT BLD: ABNORMAL (ref 9–20)
CALCIUM SERPL-MCNC: 9.7 MG/DL (ref 8.6–10.4)
CHLORIDE BLD-SCNC: 101 MMOL/L (ref 98–107)
CO2: 27 MMOL/L (ref 20–31)
CREAT SERPL-MCNC: 0.98 MG/DL (ref 0.5–0.9)
DIFFERENTIAL TYPE: ABNORMAL
EOSINOPHILS RELATIVE PERCENT: 1 % (ref 0–4)
GFR AFRICAN AMERICAN: >60 ML/MIN
GFR NON-AFRICAN AMERICAN: 55 ML/MIN
GFR SERPL CREATININE-BSD FRML MDRD: ABNORMAL ML/MIN/{1.73_M2}
GFR SERPL CREATININE-BSD FRML MDRD: ABNORMAL ML/MIN/{1.73_M2}
GLUCOSE BLD-MCNC: 135 MG/DL (ref 65–105)
GLUCOSE BLD-MCNC: 146 MG/DL (ref 70–99)
HCT VFR BLD CALC: 40.8 % (ref 36–46)
HEMOGLOBIN: 13.4 G/DL (ref 12–16)
IMMATURE GRANULOCYTES: ABNORMAL %
LYMPHOCYTES # BLD: 17 % (ref 24–44)
MCH RBC QN AUTO: 31.9 PG (ref 26–34)
MCHC RBC AUTO-ENTMCNC: 32.9 G/DL (ref 31–37)
MCV RBC AUTO: 97 FL (ref 80–100)
MONOCYTES # BLD: 7 % (ref 1–7)
NRBC AUTOMATED: ABNORMAL PER 100 WBC
PDW BLD-RTO: 12.9 % (ref 11.5–14.9)
PLATELET # BLD: 205 K/UL (ref 150–450)
PLATELET ESTIMATE: ABNORMAL
PMV BLD AUTO: 8.5 FL (ref 6–12)
POTASSIUM SERPL-SCNC: 4.6 MMOL/L (ref 3.7–5.3)
RBC # BLD: 4.2 M/UL (ref 4–5.2)
RBC # BLD: ABNORMAL 10*6/UL
REASON FOR REJECTION: NORMAL
SEG NEUTROPHILS: 74 % (ref 36–66)
SEGMENTED NEUTROPHILS ABSOLUTE COUNT: 7.3 K/UL (ref 1.3–9.1)
SODIUM BLD-SCNC: 140 MMOL/L (ref 135–144)
WBC # BLD: 9.8 K/UL (ref 3.5–11)
WBC # BLD: ABNORMAL 10*3/UL
ZZ NTE CLEAN UP: ORDERED TEST: NORMAL
ZZ NTE WITH NAME CLEAN UP: SPECIMEN SOURCE: NORMAL

## 2019-07-17 PROCEDURE — 36415 COLL VENOUS BLD VENIPUNCTURE: CPT

## 2019-07-17 PROCEDURE — 73590 X-RAY EXAM OF LOWER LEG: CPT

## 2019-07-17 PROCEDURE — 82947 ASSAY GLUCOSE BLOOD QUANT: CPT

## 2019-07-17 PROCEDURE — 72170 X-RAY EXAM OF PELVIS: CPT

## 2019-07-17 PROCEDURE — 6370000000 HC RX 637 (ALT 250 FOR IP): Performed by: NURSE PRACTITIONER

## 2019-07-17 PROCEDURE — 80048 BASIC METABOLIC PNL TOTAL CA: CPT

## 2019-07-17 PROCEDURE — 99284 EMERGENCY DEPT VISIT MOD MDM: CPT

## 2019-07-17 PROCEDURE — 73562 X-RAY EXAM OF KNEE 3: CPT

## 2019-07-17 PROCEDURE — 72125 CT NECK SPINE W/O DYE: CPT

## 2019-07-17 PROCEDURE — 85025 COMPLETE CBC W/AUTO DIFF WBC: CPT

## 2019-07-17 PROCEDURE — 70450 CT HEAD/BRAIN W/O DYE: CPT

## 2019-07-17 RX ORDER — ACETAMINOPHEN 325 MG/1
650 TABLET ORAL ONCE
Status: COMPLETED | OUTPATIENT
Start: 2019-07-17 | End: 2019-07-17

## 2019-07-17 RX ADMIN — ACETAMINOPHEN 650 MG: 325 TABLET, FILM COATED ORAL at 09:33

## 2019-07-17 ASSESSMENT — ENCOUNTER SYMPTOMS
SHORTNESS OF BREATH: 0
COUGH: 0
NAUSEA: 0
BOWEL INCONTINENCE: 0
VOMITING: 0
TROUBLE SWALLOWING: 0

## 2019-07-17 ASSESSMENT — PAIN SCALES - GENERAL: PAINLEVEL_OUTOF10: 6

## 2019-07-17 ASSESSMENT — PAIN DESCRIPTION - LOCATION: LOCATION: LEG

## 2019-07-17 ASSESSMENT — PAIN DESCRIPTION - PAIN TYPE: TYPE: ACUTE PAIN

## 2019-07-17 ASSESSMENT — PAIN DESCRIPTION - DESCRIPTORS: DESCRIPTORS: ACHING

## 2019-07-17 ASSESSMENT — PAIN DESCRIPTION - ORIENTATION: ORIENTATION: LEFT

## 2019-07-17 NOTE — ED PROVIDER NOTES
Disp-90 tablet, R-3      loperamide (IMODIUM) 2 MG capsule Take 1 capsule by mouth as needed for Diarrhea, Disp-30 capsule, R-1      Insulin Syringe-Needle U-100 (INSULIN SYRINGE .5CC/31GX5/16\") 31G X 5/16\" 0.5 ML MISC DAILY Starting 11/4/2016, Until Discontinued, Disp-100 each, R-3, Print      glipiZIDE (GLUCOTROL) 10 MG tablet Take 1 tablet by mouth every morning (before breakfast), Disp-90 tablet, R-3      aspirin 81 MG chewable tablet Take 1 tablet by mouth daily, Disp-30 tablet, R-3             ALLERGIES     is allergic to lantus [insulin glargine]; metformin and related; prednisone; sertraline; eggs or egg-derived products; novolog [insulin aspart]; and zinc.    FAMILY HISTORY     She indicated that the status of her mother is unknown. She indicated that the status of her father is unknown. She indicated that the status of her brother is unknown. SOCIAL HISTORY      reports that she has never smoked. She has never used smokeless tobacco. She reports that she does not drink alcohol or use drugs. PHYSICAL EXAM     INITIAL VITALS: BP (!) 91/55   Pulse 78   Temp 97.7 °F (36.5 °C) (Oral)   Resp 18   Ht 5' 2\" (1.575 m)   Wt 240 lb (108.9 kg)   SpO2 98%   BMI 43.90 kg/m²      Physical Exam   Constitutional: She is oriented to person, place, and time. She appears well-developed and well-nourished. No distress. HENT:   Head: Normocephalic and atraumatic. Right Ear: External ear normal.   Left Ear: External ear normal.   Nose: Nose normal.   Eyes: Pupils are equal, round, and reactive to light. Right eye exhibits no discharge. Left eye exhibits no discharge. No scleral icterus. Neck: Normal range of motion. No tracheal deviation present. Cardiovascular: Normal rate and regular rhythm. Pulmonary/Chest: Effort normal and breath sounds normal. No stridor. No respiratory distress. Abdominal: Soft. Bowel sounds are normal. There is no tenderness. Musculoskeletal: Normal range of motion.  She limits   POC GLUCOSE FINGERSTICK - Abnormal; Notable for the following components:    POC Glucose 135 (*)     All other components within normal limits   SPECIMEN REJECTION         EMERGENCY DEPARTMENT COURSE:   Vitals:    Vitals:    07/17/19 0906 07/17/19 1030 07/17/19 1100 07/17/19 1200   BP: (!) 117/48 (!) 109/58 118/62 (!) 91/55   Pulse: 78      Resp: 18      Temp: 97.7 °F (36.5 °C)      TempSrc: Oral      SpO2: 94% (!) 86% 97% 98%   Weight: 240 lb (108.9 kg)      Height: 5' 2\" (1.575 m)          Reviewed. The patient was given the followingmedications while in the emergency department:  Orders Placed This Encounter   Medications    acetaminophen (TYLENOL) tablet 650 mg         CONSULTS:  None    PROCEDURES:  Procedures    FINAL IMPRESSION      1. Bilateral leg pain    2.  Fall, initial encounter          DISPOSITION/PLAN   DISPOSITION Decision To Discharge 07/17/2019 01:01:39 PM      PATIENT REFERRED TO:  Adarsh Hernandeznhaven [address] [place], [place] [id number]    Schedule an appointment as soon as possible for a visit       [address] [place] [id number] [phone]    As needed, If symptoms worsen      DISCHARGE MEDICATIONS:  Discharge Medication List as of 7/17/2019  1:02 PM          (Please note that portions of this note were completed with a voice recognition program.  Efforts were made to edit the dictations but occasionally wordsare mis-transcribed.)    FELICE Arriola CNP, APRN - CNP  07/17/19 8438

## 2019-07-22 ENCOUNTER — APPOINTMENT (OUTPATIENT)
Dept: GENERAL RADIOLOGY | Age: 79
End: 2019-07-22
Payer: MEDICARE

## 2019-07-22 ENCOUNTER — HOSPITAL ENCOUNTER (EMERGENCY)
Age: 79
Discharge: HOME | End: 2019-07-22
Attending: EMERGENCY MEDICINE
Payer: MEDICARE

## 2019-07-22 VITALS
TEMPERATURE: 97.4 F | OXYGEN SATURATION: 97 % | DIASTOLIC BLOOD PRESSURE: 73 MMHG | HEART RATE: 64 BPM | BODY MASS INDEX: 44.37 KG/M2 | SYSTOLIC BLOOD PRESSURE: 99 MMHG | RESPIRATION RATE: 16 BRPM | WEIGHT: 235 LBS | HEIGHT: 61 IN

## 2019-07-22 DIAGNOSIS — L03.116 CELLULITIS OF LEFT LOWER EXTREMITY: Primary | ICD-10-CM

## 2019-07-22 LAB
-: NORMAL
ABSOLUTE EOS #: 0.1 K/UL (ref 0–0.4)
ABSOLUTE IMMATURE GRANULOCYTE: ABNORMAL K/UL (ref 0–0.3)
ABSOLUTE LYMPH #: 1.8 K/UL (ref 1–4.8)
ABSOLUTE MONO #: 0.9 K/UL (ref 0.1–1.3)
ANION GAP SERPL CALCULATED.3IONS-SCNC: 12 MMOL/L (ref 9–17)
BASOPHILS # BLD: 1 % (ref 0–2)
BASOPHILS ABSOLUTE: 0.1 K/UL (ref 0–0.2)
BUN BLDV-MCNC: 30 MG/DL (ref 8–23)
BUN/CREAT BLD: ABNORMAL (ref 9–20)
CALCIUM SERPL-MCNC: 9.7 MG/DL (ref 8.6–10.4)
CHLORIDE BLD-SCNC: 100 MMOL/L (ref 98–107)
CO2: 28 MMOL/L (ref 20–31)
CREAT SERPL-MCNC: 1.04 MG/DL (ref 0.5–0.9)
DIFFERENTIAL TYPE: ABNORMAL
EOSINOPHILS RELATIVE PERCENT: 1 % (ref 0–4)
GFR AFRICAN AMERICAN: >60 ML/MIN
GFR NON-AFRICAN AMERICAN: 51 ML/MIN
GFR SERPL CREATININE-BSD FRML MDRD: ABNORMAL ML/MIN/{1.73_M2}
GFR SERPL CREATININE-BSD FRML MDRD: ABNORMAL ML/MIN/{1.73_M2}
GLUCOSE BLD-MCNC: 103 MG/DL (ref 70–99)
HCT VFR BLD CALC: 39.1 % (ref 36–46)
HEMOGLOBIN: 12.9 G/DL (ref 12–16)
IMMATURE GRANULOCYTES: ABNORMAL %
LYMPHOCYTES # BLD: 16 % (ref 24–44)
MCH RBC QN AUTO: 31.7 PG (ref 26–34)
MCHC RBC AUTO-ENTMCNC: 32.9 G/DL (ref 31–37)
MCV RBC AUTO: 96.3 FL (ref 80–100)
MONOCYTES # BLD: 8 % (ref 1–7)
NRBC AUTOMATED: ABNORMAL PER 100 WBC
PDW BLD-RTO: 13.3 % (ref 11.5–14.9)
PLATELET # BLD: 200 K/UL (ref 150–450)
PLATELET ESTIMATE: ABNORMAL
PMV BLD AUTO: 9.1 FL (ref 6–12)
POTASSIUM SERPL-SCNC: 3.8 MMOL/L (ref 3.7–5.3)
RBC # BLD: 4.06 M/UL (ref 4–5.2)
RBC # BLD: ABNORMAL 10*6/UL
REASON FOR REJECTION: NORMAL
SEG NEUTROPHILS: 74 % (ref 36–66)
SEGMENTED NEUTROPHILS ABSOLUTE COUNT: 8.1 K/UL (ref 1.3–9.1)
SODIUM BLD-SCNC: 140 MMOL/L (ref 135–144)
WBC # BLD: 11 K/UL (ref 3.5–11)
WBC # BLD: ABNORMAL 10*3/UL
ZZ NTE CLEAN UP: ORDERED TEST: NORMAL
ZZ NTE WITH NAME CLEAN UP: SPECIMEN SOURCE: NORMAL

## 2019-07-22 PROCEDURE — 80048 BASIC METABOLIC PNL TOTAL CA: CPT

## 2019-07-22 PROCEDURE — 99284 EMERGENCY DEPT VISIT MOD MDM: CPT

## 2019-07-22 PROCEDURE — 93971 EXTREMITY STUDY: CPT

## 2019-07-22 PROCEDURE — 85025 COMPLETE CBC W/AUTO DIFF WBC: CPT

## 2019-07-22 PROCEDURE — 6370000000 HC RX 637 (ALT 250 FOR IP): Performed by: EMERGENCY MEDICINE

## 2019-07-22 PROCEDURE — 36415 COLL VENOUS BLD VENIPUNCTURE: CPT

## 2019-07-22 PROCEDURE — 73590 X-RAY EXAM OF LOWER LEG: CPT

## 2019-07-22 RX ORDER — ACETAMINOPHEN 325 MG/1
650 TABLET ORAL EVERY 6 HOURS PRN
Qty: 40 TABLET | Refills: 0 | Status: SHIPPED | OUTPATIENT
Start: 2019-07-22

## 2019-07-22 RX ORDER — CLINDAMYCIN HYDROCHLORIDE 300 MG/1
300 CAPSULE ORAL 3 TIMES DAILY
Qty: 30 CAPSULE | Refills: 0 | Status: SHIPPED | OUTPATIENT
Start: 2019-07-22 | End: 2019-08-01

## 2019-07-22 RX ORDER — CLINDAMYCIN HYDROCHLORIDE 150 MG/1
300 CAPSULE ORAL ONCE
Status: COMPLETED | OUTPATIENT
Start: 2019-07-22 | End: 2019-07-22

## 2019-07-22 RX ORDER — ACETAMINOPHEN 500 MG
1000 TABLET ORAL ONCE
Status: COMPLETED | OUTPATIENT
Start: 2019-07-22 | End: 2019-07-22

## 2019-07-22 RX ADMIN — ACETAMINOPHEN 1000 MG: 500 TABLET, FILM COATED ORAL at 11:51

## 2019-07-22 RX ADMIN — CLINDAMYCIN HYDROCHLORIDE 300 MG: 150 CAPSULE ORAL at 14:15

## 2019-07-22 ASSESSMENT — ENCOUNTER SYMPTOMS
CHEST TIGHTNESS: 0
EYE REDNESS: 0
SHORTNESS OF BREATH: 0
EYE PAIN: 0
CONSTIPATION: 0
VOMITING: 0
BACK PAIN: 1
SORE THROAT: 0
NAUSEA: 0
DIARRHEA: 0
ABDOMINAL PAIN: 0
COUGH: 0

## 2019-07-22 ASSESSMENT — PAIN SCALES - GENERAL
PAINLEVEL_OUTOF10: 7
PAINLEVEL_OUTOF10: 10

## 2019-07-22 ASSESSMENT — PAIN DESCRIPTION - LOCATION: LOCATION: LEG

## 2019-07-22 ASSESSMENT — PAIN DESCRIPTION - ORIENTATION: ORIENTATION: LEFT

## 2019-07-22 NOTE — ED PROVIDER NOTES
[address] ED  eMERGENCY dEPARTMENT eNCOUnter    Pt Name: Ashleigh Womack  MRN: [id number]  [place] 1940  Date of evaluation: 7/22/19  CHIEF COMPLAINT       Chief Complaint   Patient presents with    Leg Pain     left     HISTORY OF PRESENT ILLNESS   HPI   Progressive LLE pain, swelling and skin redness after a fall from standing on 7/17. Seen in the ED on day of fall and had negative XR imaging of knee and tib/fib. Initially had a lot of shin bruising but no skin tear or laceration, now entire LLE red and painful. Trying tylenol for pain at home with minimal relief. Denies current cp or SOB. She has no known history of DVT or PE. She is on plavix and aspirin. REVIEW OF SYSTEMS     Review of Systems   Constitutional: Negative for chills and fever. HENT: Negative for congestion, ear pain and sore throat. Eyes: Negative for pain, redness and visual disturbance. Respiratory: Negative for cough, chest tightness and shortness of breath. Cardiovascular: Negative for chest pain and palpitations. Gastrointestinal: Negative for abdominal pain, constipation, diarrhea, nausea and vomiting. Genitourinary: Negative for dysuria and vaginal discharge. Musculoskeletal: Positive for arthralgias (LLE pain and swelling) and back pain. Negative for neck pain. Skin: Negative for rash and wound. Neurological: Negative for seizures, syncope and headaches.      PASTMEDICAL HISTORY     Past Medical History:   Diagnosis Date    Acute upper respiratory infection     Backache     Bell's palsy     Cellulitis     Cough     Diabetes mellitus ([address]     H/O acute bronchitis with bronchospasm     H/O acute sinusitis     H/O allergic reaction     H/O allergic rhinitis     H/O tinea cruris     Hx of heart artery stent     heart stents x 4    Hyperlipidemia     Joint pain, hip     MI (myocardial infarction) ([address]     Morbid obesity (HCC)     Otitis externa     Subarachnoid bleed ([address] march 28, 2016

## 2019-07-22 NOTE — ED NOTES
Bed: 04  Expected date:   Expected time:   Means of arrival:   Comments:     Gisela Arenas RN  07/22/19 1041

## 2019-07-26 ENCOUNTER — OFFICE VISIT (OUTPATIENT)
Dept: PRIMARY CARE CLINIC | Age: 79
End: 2019-07-26
Payer: MEDICARE

## 2019-07-26 VITALS
TEMPERATURE: 97.7 F | HEART RATE: 82 BPM | WEIGHT: 240.6 LBS | OXYGEN SATURATION: 98 % | SYSTOLIC BLOOD PRESSURE: 112 MMHG | BODY MASS INDEX: 45.46 KG/M2 | DIASTOLIC BLOOD PRESSURE: 64 MMHG

## 2019-07-26 DIAGNOSIS — L03.116 CELLULITIS OF LEFT LOWER EXTREMITY: ICD-10-CM

## 2019-07-26 PROCEDURE — 99213 OFFICE O/P EST LOW 20 MIN: CPT | Performed by: FAMILY MEDICINE

## 2019-07-26 ASSESSMENT — ENCOUNTER SYMPTOMS
COUGH: 0
NAUSEA: 0
VOMITING: 0
SHORTNESS OF BREATH: 1

## 2019-07-26 NOTE — PROGRESS NOTES
MG) 1250 (500 Ca) MG tablet TAKE 1 TABLET BY MOUTH ONE TIME A DAY  30 tablet 1    methotrexate (RHEUMATREX) 2.5 MG chemo tablet Take 2 tablets by mouth once a week      fluocinolone acetonide (SYNALAR) 0.01 % external solution Apply 1 each topically 2 times daily as needed  3    triamcinolone (KENALOG) 0.025 % ointment Apply 1 each topically 2 times daily as needed  1    Glucose Blood (BLOOD GLUCOSE TEST STRIPS) STRP 1 strip by Does not apply route 4 times daily Indications: Mariam strips 200 strip 3    furosemide (LASIX) 40 MG tablet Take 40 mg by mouth daily Prescribed by Dr. Shamika Phillips (APPLE CIDER VINEGAR) TABS Take by mouth      vitamin D (ERGOCALCIFEROL) 31362 UNITS CAPS capsule TAKE 1 CAPSULE BY MOUTH ONE TIME A WEEK  4 capsule 2    TRESIBA FLEXTOUCH 100 UNIT/ML SOPN 40 Units nightly Patient is taking 60 units nightly      clopidogrel (PLAVIX) 75 MG tablet Take 1 tablet by mouth daily 90 tablet 3    metoprolol tartrate (LOPRESSOR) 25 MG tablet Take 0.5 tablets by mouth 2 times daily Indications: per Cardiologist's order 90 tablet 3    loperamide (IMODIUM) 2 MG capsule Take 1 capsule by mouth as needed for Diarrhea 30 capsule 1    Insulin Syringe-Needle U-100 (INSULIN SYRINGE .5CC/31GX5/16\") 31G X 5/16\" 0.5 ML MISC 1 each by Does not apply route daily 100 each 3    glipiZIDE (GLUCOTROL) 10 MG tablet Take 1 tablet by mouth every morning (before breakfast) 90 tablet 3    aspirin 81 MG chewable tablet Take 1 tablet by mouth daily 30 tablet 3     No current facility-administered medications for this visit.       Allergies   Allergen Reactions    Lantus [Insulin Glargine]      Hives,itching    Metformin And Related Diarrhea    Prednisone Swelling     Facial swelling    Sertraline Other (See Comments)     hallucination    Eggs Or Egg-Derived Products Rash    Novolog [Insulin Aspart] Itching and Rash    Zinc Itching and Rash       Health Maintenance   Topic Date Due    10/26/2019) for medication f/u. No orders of the defined types were placed in this encounter. No orders of the defined types were placed in this encounter. Patient given educationalmaterials - see patient instructions. Discussed use, benefit, and side effectsof prescribed medications. All patient questions answered. Pt voiced understanding. Reviewed health maintenance. Instructed to continue current medications, diet andexercise. Patient agreed with treatment plan. Follow up as directed.      Electronicallysigned by Gaylen Kayser, MD on 7/26/2019 at 4:15 PM

## 2019-07-30 ENCOUNTER — HOSPITAL ENCOUNTER (OUTPATIENT)
Dept: VASCULAR LAB | Age: 79
Discharge: HOME | End: 2019-07-30
Payer: MEDICARE

## 2019-07-30 ENCOUNTER — TELEPHONE (OUTPATIENT)
Dept: PRIMARY CARE CLINIC | Age: 79
End: 2019-07-30

## 2019-07-30 DIAGNOSIS — L53.9 REDNESS OF SKIN: ICD-10-CM

## 2019-07-30 DIAGNOSIS — M79.89 LEFT LEG SWELLING: Primary | ICD-10-CM

## 2019-07-30 DIAGNOSIS — R23.8 LOCALIZED WARMTH OF SKIN: ICD-10-CM

## 2019-07-30 DIAGNOSIS — M79.89 LEFT LEG SWELLING: ICD-10-CM

## 2019-07-30 PROCEDURE — 93971 EXTREMITY STUDY: CPT

## 2019-07-30 RX ORDER — CEPHALEXIN 500 MG/1
500 CAPSULE ORAL 3 TIMES DAILY
Qty: 30 CAPSULE | Refills: 0 | Status: SHIPPED | OUTPATIENT
Start: 2019-07-30 | End: 2019-08-09 | Stop reason: ALTCHOICE

## 2019-07-30 NOTE — TELEPHONE ENCOUNTER
Spoke with patient. She will call and schedule the doppler at [facility]. Order faxed there as well.

## 2019-08-09 ENCOUNTER — OFFICE VISIT (OUTPATIENT)
Dept: PRIMARY CARE CLINIC | Age: 79
End: 2019-08-09
Payer: MEDICARE

## 2019-08-09 VITALS
WEIGHT: 244.8 LBS | HEART RATE: 83 BPM | DIASTOLIC BLOOD PRESSURE: 62 MMHG | BODY MASS INDEX: 46.25 KG/M2 | SYSTOLIC BLOOD PRESSURE: 122 MMHG | OXYGEN SATURATION: 95 % | TEMPERATURE: 97.9 F

## 2019-08-09 DIAGNOSIS — E11.65 UNCONTROLLED TYPE 2 DIABETES MELLITUS WITH HYPERGLYCEMIA (HCC): ICD-10-CM

## 2019-08-09 DIAGNOSIS — I50.32 CHRONIC DIASTOLIC CHF (CONGESTIVE HEART FAILURE) (HCC): ICD-10-CM

## 2019-08-09 DIAGNOSIS — L03.116 CELLULITIS OF LEFT LOWER EXTREMITY: Primary | ICD-10-CM

## 2019-08-09 PROCEDURE — 99213 OFFICE O/P EST LOW 20 MIN: CPT | Performed by: FAMILY MEDICINE

## 2019-08-09 RX ORDER — DOXYCYCLINE 100 MG/1
100 CAPSULE ORAL 2 TIMES DAILY
Qty: 20 CAPSULE | Refills: 0 | Status: SHIPPED | OUTPATIENT
Start: 2019-08-09 | End: 2019-08-19

## 2019-08-09 ASSESSMENT — ENCOUNTER SYMPTOMS
NAUSEA: 0
SHORTNESS OF BREATH: 0
VOMITING: 0
DIARRHEA: 1

## 2019-08-09 NOTE — PROGRESS NOTES
[facility] PRIMARY CARE  [address] [facility] [address]  Dept: [address] is a [de-identified] y.o. female who presents today for her medical conditions/complaintsas noted below. Chief Complaint   Patient presents with    Cellulitis     Recheck. Pt started keflex on 7/30. Pt states her leg is still hot to touch, swollen and itchy       HPI:     HPI-using cool compresses and leg is still really warm and more red.       LDL Cholesterol (mg/dL)   Date Value   06/24/2016 72   12/01/2015 68     LDL Calculated (mg/dL)   Date Value   07/06/2017 76       (goal LDL is <100)   AST (U/L)   Date Value   06/20/2019 18     ALT (U/L)   Date Value   06/20/2019 14     BUN (mg/dL)   Date Value   07/22/2019 30 (H)     BP Readings from Last 3 Encounters:   08/09/19 122/62   07/26/19 112/64   07/22/19 99/73          (goal 120/80)    Past Medical History:   Diagnosis Date    Acute upper respiratory infection     Backache     Bell's palsy     Cellulitis     Cough     Diabetes mellitus ([address]     H/O acute bronchitis with bronchospasm     H/O acute sinusitis     H/O allergic reaction     H/O allergic rhinitis     H/O tinea cruris     Hx of heart artery stent     heart stents x 4    Hyperlipidemia     Joint pain, hip     MI (myocardial infarction) ([address]     Morbid obesity ([address]     Otitis externa     Subarachnoid bleed ([address] march 28, 2016    Tendonitis, bicipital       Past Surgical History:   Procedure Laterality Date    CARDIAC CATHETERIZATION      heart stents x 4    [facility]PLANT [facility]    CATARACT REMOVAL WITH IMPLANT Right 03/05/2019    [facility]    INTRACAPSULAR CATARACT EXTRACTION Right 3/5/2019    EYE CATARACT EMULSIFICATION IOL IMPLANT performed by Sherri Cho MD at [facility] Left      [facility] CATARACT EXTRACAP,INSERT LENS Left 11/1/2018    EYE CATARACT

## 2019-09-06 ENCOUNTER — TELEPHONE (OUTPATIENT)
Dept: PRIMARY CARE CLINIC | Age: 79
End: 2019-09-06

## 2019-10-02 ENCOUNTER — OFFICE VISIT (OUTPATIENT)
Dept: PRIMARY CARE CLINIC | Age: 79
End: 2019-10-02
Payer: MEDICARE

## 2019-10-02 VITALS
BODY MASS INDEX: 46.22 KG/M2 | SYSTOLIC BLOOD PRESSURE: 106 MMHG | OXYGEN SATURATION: 96 % | HEART RATE: 73 BPM | WEIGHT: 244.6 LBS | DIASTOLIC BLOOD PRESSURE: 64 MMHG

## 2019-10-02 DIAGNOSIS — R60.0 LOCALIZED EDEMA: ICD-10-CM

## 2019-10-02 DIAGNOSIS — L21.9 SEBORRHEIC DERMATITIS OF SCALP: ICD-10-CM

## 2019-10-02 DIAGNOSIS — M79.89 LEFT LEG SWELLING: ICD-10-CM

## 2019-10-02 DIAGNOSIS — L53.9 REDNESS OF SKIN: Primary | ICD-10-CM

## 2019-10-02 DIAGNOSIS — R22.42 MASS OF LEFT LOWER EXTREMITY: ICD-10-CM

## 2019-10-02 PROCEDURE — 99213 OFFICE O/P EST LOW 20 MIN: CPT | Performed by: PHYSICIAN ASSISTANT

## 2019-10-02 RX ORDER — CLOBETASOL PROPIONATE 0.05 G/ML
SPRAY TOPICAL
Qty: 1 BOTTLE | Refills: 0 | Status: ON HOLD | OUTPATIENT
Start: 2019-10-02 | End: 2021-01-01 | Stop reason: HOSPADM

## 2019-10-02 ASSESSMENT — ENCOUNTER SYMPTOMS
EYES NEGATIVE: 1
COLOR CHANGE: 0
ABDOMINAL PAIN: 0
RESPIRATORY NEGATIVE: 1

## 2019-10-04 ENCOUNTER — HOSPITAL ENCOUNTER (OUTPATIENT)
Dept: ULTRASOUND IMAGING | Age: 79
Discharge: HOME | End: 2019-10-06
Payer: MEDICARE

## 2019-10-04 DIAGNOSIS — L53.9 REDNESS OF SKIN: ICD-10-CM

## 2019-10-04 DIAGNOSIS — R22.42 MASS OF LEFT LOWER EXTREMITY: ICD-10-CM

## 2019-10-04 DIAGNOSIS — M79.89 LEFT LEG SWELLING: ICD-10-CM

## 2019-10-04 PROCEDURE — 76882 US LMTD JT/FCL EVL NVASC XTR: CPT

## 2019-10-14 ENCOUNTER — TELEPHONE (OUTPATIENT)
Dept: PRIMARY CARE CLINIC | Age: 79
End: 2019-10-14

## 2019-11-07 ENCOUNTER — HOSPITAL ENCOUNTER (OUTPATIENT)
Age: 79
Setting detail: SPECIMEN
Discharge: HOME | End: 2019-11-07
Payer: MEDICARE

## 2019-11-07 DIAGNOSIS — R60.0 LOCALIZED EDEMA: ICD-10-CM

## 2019-11-07 DIAGNOSIS — L53.9 REDNESS OF SKIN: ICD-10-CM

## 2019-11-07 DIAGNOSIS — M79.89 LEFT LEG SWELLING: ICD-10-CM

## 2019-11-07 LAB
ANION GAP SERPL CALCULATED.3IONS-SCNC: 15 MMOL/L (ref 9–17)
BUN BLDV-MCNC: 39 MG/DL (ref 8–23)
BUN/CREAT BLD: ABNORMAL (ref 9–20)
CALCIUM SERPL-MCNC: 9.7 MG/DL (ref 8.6–10.4)
CHLORIDE BLD-SCNC: 97 MMOL/L (ref 98–107)
CO2: 29 MMOL/L (ref 20–31)
CREAT SERPL-MCNC: 1.46 MG/DL (ref 0.5–0.9)
GFR AFRICAN AMERICAN: 42 ML/MIN
GFR NON-AFRICAN AMERICAN: 34 ML/MIN
GFR SERPL CREATININE-BSD FRML MDRD: ABNORMAL ML/MIN/{1.73_M2}
GFR SERPL CREATININE-BSD FRML MDRD: ABNORMAL ML/MIN/{1.73_M2}
GLUCOSE BLD-MCNC: 222 MG/DL (ref 70–99)
POTASSIUM SERPL-SCNC: 4.7 MMOL/L (ref 3.7–5.3)
SODIUM BLD-SCNC: 141 MMOL/L (ref 135–144)

## 2019-11-08 DIAGNOSIS — M79.89 LEFT LEG SWELLING: ICD-10-CM

## 2019-11-08 DIAGNOSIS — L53.9 REDNESS OF SKIN: Primary | ICD-10-CM

## 2019-11-08 DIAGNOSIS — R60.0 LOCALIZED EDEMA: ICD-10-CM

## 2019-11-14 ENCOUNTER — HOSPITAL ENCOUNTER (OUTPATIENT)
Age: 79
Setting detail: SPECIMEN
Discharge: HOME | End: 2019-11-14
Payer: MEDICARE

## 2019-11-14 DIAGNOSIS — M79.89 LEFT LEG SWELLING: ICD-10-CM

## 2019-11-14 DIAGNOSIS — L53.9 REDNESS OF SKIN: ICD-10-CM

## 2019-11-14 DIAGNOSIS — R60.0 LOCALIZED EDEMA: ICD-10-CM

## 2019-11-14 LAB
ANION GAP SERPL CALCULATED.3IONS-SCNC: 16 MMOL/L (ref 9–17)
BUN BLDV-MCNC: 25 MG/DL (ref 8–23)
BUN/CREAT BLD: ABNORMAL (ref 9–20)
CALCIUM SERPL-MCNC: 9.5 MG/DL (ref 8.6–10.4)
CHLORIDE BLD-SCNC: 100 MMOL/L (ref 98–107)
CO2: 26 MMOL/L (ref 20–31)
CREAT SERPL-MCNC: 1.15 MG/DL (ref 0.5–0.9)
GFR AFRICAN AMERICAN: 55 ML/MIN
GFR NON-AFRICAN AMERICAN: 45 ML/MIN
GFR SERPL CREATININE-BSD FRML MDRD: ABNORMAL ML/MIN/{1.73_M2}
GFR SERPL CREATININE-BSD FRML MDRD: ABNORMAL ML/MIN/{1.73_M2}
GLUCOSE BLD-MCNC: 286 MG/DL (ref 70–99)
POTASSIUM SERPL-SCNC: 4.8 MMOL/L (ref 3.7–5.3)
SODIUM BLD-SCNC: 142 MMOL/L (ref 135–144)

## 2019-11-15 ENCOUNTER — OFFICE VISIT (OUTPATIENT)
Dept: PRIMARY CARE CLINIC | Age: 79
End: 2019-11-15
Payer: MEDICARE

## 2019-11-15 VITALS — BODY MASS INDEX: 46.9 KG/M2 | WEIGHT: 248.2 LBS | SYSTOLIC BLOOD PRESSURE: 100 MMHG | DIASTOLIC BLOOD PRESSURE: 58 MMHG

## 2019-11-15 DIAGNOSIS — G62.9 NEUROPATHY: ICD-10-CM

## 2019-11-15 DIAGNOSIS — E55.9 VITAMIN D DEFICIENCY: ICD-10-CM

## 2019-11-15 DIAGNOSIS — E11.65 UNCONTROLLED TYPE 2 DIABETES MELLITUS WITH HYPERGLYCEMIA (HCC): ICD-10-CM

## 2019-11-15 DIAGNOSIS — I50.32 CHRONIC DIASTOLIC CHF (CONGESTIVE HEART FAILURE) (HCC): Primary | ICD-10-CM

## 2019-11-15 PROCEDURE — 99214 OFFICE O/P EST MOD 30 MIN: CPT | Performed by: FAMILY MEDICINE

## 2019-11-15 ASSESSMENT — ENCOUNTER SYMPTOMS
EYE DISCHARGE: 0
EYE REDNESS: 0
SORE THROAT: 0
SHORTNESS OF BREATH: 0
WHEEZING: 0
VOMITING: 0
COUGH: 0
ABDOMINAL PAIN: 0
NAUSEA: 0
DIARRHEA: 0
RHINORRHEA: 0

## 2019-11-19 ENCOUNTER — TELEPHONE (OUTPATIENT)
Dept: PRIMARY CARE CLINIC | Age: 79
End: 2019-11-19

## 2019-11-27 ENCOUNTER — HOSPITAL ENCOUNTER (OUTPATIENT)
Age: 79
Setting detail: SPECIMEN
Discharge: HOME | End: 2019-11-27
Payer: MEDICARE

## 2019-11-27 DIAGNOSIS — E11.65 UNCONTROLLED TYPE 2 DIABETES MELLITUS WITH HYPERGLYCEMIA (HCC): ICD-10-CM

## 2019-11-27 DIAGNOSIS — I50.32 CHRONIC DIASTOLIC CHF (CONGESTIVE HEART FAILURE) (HCC): ICD-10-CM

## 2019-11-27 DIAGNOSIS — G62.9 NEUROPATHY: ICD-10-CM

## 2019-11-27 DIAGNOSIS — E55.9 VITAMIN D DEFICIENCY: ICD-10-CM

## 2019-11-27 LAB
ABSOLUTE EOS #: 0.16 K/UL (ref 0–0.44)
ABSOLUTE IMMATURE GRANULOCYTE: 0.06 K/UL (ref 0–0.3)
ABSOLUTE LYMPH #: 2.55 K/UL (ref 1.1–3.7)
ABSOLUTE MONO #: 0.96 K/UL (ref 0.1–1.2)
ALBUMIN SERPL-MCNC: 3.7 G/DL (ref 3.5–5.2)
ALBUMIN/GLOBULIN RATIO: 0.8 (ref 1–2.5)
ALP BLD-CCNC: 114 U/L (ref 35–104)
ALT SERPL-CCNC: 18 U/L (ref 5–33)
ANION GAP SERPL CALCULATED.3IONS-SCNC: 15 MMOL/L (ref 9–17)
AST SERPL-CCNC: 21 U/L
BASOPHILS # BLD: 1 % (ref 0–2)
BASOPHILS ABSOLUTE: 0.09 K/UL (ref 0–0.2)
BILIRUB SERPL-MCNC: 0.33 MG/DL (ref 0.3–1.2)
BUN BLDV-MCNC: 36 MG/DL (ref 8–23)
BUN/CREAT BLD: ABNORMAL (ref 9–20)
CALCIUM SERPL-MCNC: 10.1 MG/DL (ref 8.6–10.4)
CHLORIDE BLD-SCNC: 96 MMOL/L (ref 98–107)
CO2: 31 MMOL/L (ref 20–31)
CREAT SERPL-MCNC: 1.17 MG/DL (ref 0.5–0.9)
DIFFERENTIAL TYPE: ABNORMAL
EOSINOPHILS RELATIVE PERCENT: 1 % (ref 1–4)
FOLATE: >20 NG/ML
GFR AFRICAN AMERICAN: 54 ML/MIN
GFR NON-AFRICAN AMERICAN: 45 ML/MIN
GFR SERPL CREATININE-BSD FRML MDRD: ABNORMAL ML/MIN/{1.73_M2}
GFR SERPL CREATININE-BSD FRML MDRD: ABNORMAL ML/MIN/{1.73_M2}
GLUCOSE BLD-MCNC: 134 MG/DL (ref 70–99)
HCT VFR BLD CALC: 42.1 % (ref 36.3–47.1)
HEMOGLOBIN: 13.2 G/DL (ref 11.9–15.1)
IMMATURE GRANULOCYTES: 1 %
LYMPHOCYTES # BLD: 20 % (ref 24–43)
MCH RBC QN AUTO: 31.6 PG (ref 25.2–33.5)
MCHC RBC AUTO-ENTMCNC: 31.4 G/DL (ref 28.4–34.8)
MCV RBC AUTO: 100.7 FL (ref 82.6–102.9)
MONOCYTES # BLD: 7 % (ref 3–12)
NRBC AUTOMATED: 0 PER 100 WBC
PDW BLD-RTO: 12.7 % (ref 11.8–14.4)
PLATELET # BLD: 240 K/UL (ref 138–453)
PLATELET ESTIMATE: ABNORMAL
PMV BLD AUTO: 11.3 FL (ref 8.1–13.5)
POTASSIUM SERPL-SCNC: 4.8 MMOL/L (ref 3.7–5.3)
RBC # BLD: 4.18 M/UL (ref 3.95–5.11)
RBC # BLD: ABNORMAL 10*6/UL
SEG NEUTROPHILS: 70 % (ref 36–65)
SEGMENTED NEUTROPHILS ABSOLUTE COUNT: 9.23 K/UL (ref 1.5–8.1)
SODIUM BLD-SCNC: 142 MMOL/L (ref 135–144)
TOTAL PROTEIN: 8.1 G/DL (ref 6.4–8.3)
TSH SERPL DL<=0.05 MIU/L-ACNC: 3.6 MIU/L (ref 0.3–5)
VITAMIN B-12: 1396 PG/ML (ref 232–1245)
VITAMIN D 25-HYDROXY: 18.5 NG/ML (ref 30–100)
WBC # BLD: 13.1 K/UL (ref 3.5–11.3)
WBC # BLD: ABNORMAL 10*3/UL

## 2019-11-29 LAB
ESTIMATED AVERAGE GLUCOSE: 189 MG/DL
HBA1C MFR BLD: 8.2 % (ref 4–6)

## 2019-12-19 ENCOUNTER — CARE COORDINATION (OUTPATIENT)
Dept: CARE COORDINATION | Age: 79
End: 2019-12-19

## 2019-12-27 ENCOUNTER — CARE COORDINATION (OUTPATIENT)
Dept: CARE COORDINATION | Age: 79
End: 2019-12-27

## 2019-12-27 SDOH — SOCIAL STABILITY: SOCIAL NETWORK
DO YOU BELONG TO ANY CLUBS OR ORGANIZATIONS SUCH AS CHURCH GROUPS UNIONS, FRATERNAL OR ATHLETIC GROUPS, OR SCHOOL GROUPS?: NO

## 2019-12-27 SDOH — ECONOMIC STABILITY: FOOD INSECURITY: WITHIN THE PAST 12 MONTHS, THE FOOD YOU BOUGHT JUST DIDN'T LAST AND YOU DIDN'T HAVE MONEY TO GET MORE.: NEVER TRUE

## 2019-12-27 SDOH — HEALTH STABILITY: PHYSICAL HEALTH: ON AVERAGE, HOW MANY DAYS PER WEEK DO YOU ENGAGE IN MODERATE TO STRENUOUS EXERCISE (LIKE A BRISK WALK)?: 0 DAYS

## 2019-12-27 SDOH — SOCIAL STABILITY: SOCIAL NETWORK: IN A TYPICAL WEEK, HOW MANY TIMES DO YOU TALK ON THE PHONE WITH FAMILY, FRIENDS, OR NEIGHBORS?: THREE TIMES A WEEK

## 2019-12-27 SDOH — HEALTH STABILITY: MENTAL HEALTH
STRESS IS WHEN SOMEONE FEELS TENSE, NERVOUS, ANXIOUS, OR CAN'T SLEEP AT NIGHT BECAUSE THEIR MIND IS TROUBLED. HOW STRESSED ARE YOU?: ONLY A LITTLE

## 2019-12-27 SDOH — SOCIAL STABILITY: SOCIAL NETWORK: HOW OFTEN DO YOU ATTENT MEETINGS OF THE CLUB OR ORGANIZATION YOU BELONG TO?: NEVER

## 2019-12-27 SDOH — ECONOMIC STABILITY: TRANSPORTATION INSECURITY
IN THE PAST 12 MONTHS, HAS LACK OF TRANSPORTATION KEPT YOU FROM MEETINGS, WORK, OR FROM GETTING THINGS NEEDED FOR DAILY LIVING?: NO

## 2019-12-27 SDOH — HEALTH STABILITY: PHYSICAL HEALTH: ON AVERAGE, HOW MANY MINUTES DO YOU ENGAGE IN EXERCISE AT THIS LEVEL?: 0 MIN

## 2019-12-27 SDOH — ECONOMIC STABILITY: INCOME INSECURITY: HOW HARD IS IT FOR YOU TO PAY FOR THE VERY BASICS LIKE FOOD, HOUSING, MEDICAL CARE, AND HEATING?: NOT HARD AT ALL

## 2019-12-27 SDOH — ECONOMIC STABILITY: TRANSPORTATION INSECURITY
IN THE PAST 12 MONTHS, HAS THE LACK OF TRANSPORTATION KEPT YOU FROM MEDICAL APPOINTMENTS OR FROM GETTING MEDICATIONS?: NO

## 2019-12-27 SDOH — SOCIAL STABILITY: SOCIAL NETWORK: HOW OFTEN DO YOU GET TOGETHER WITH FRIENDS OR RELATIVES?: MORE THAN THREE TIMES A WEEK

## 2019-12-27 SDOH — SOCIAL STABILITY: SOCIAL NETWORK: HOW OFTEN DO YOU ATTEND CHURCH OR RELIGIOUS SERVICES?: MORE THAN 4 TIMES PER YEAR

## 2019-12-27 SDOH — ECONOMIC STABILITY: FOOD INSECURITY: WITHIN THE PAST 12 MONTHS, YOU WORRIED THAT YOUR FOOD WOULD RUN OUT BEFORE YOU GOT MONEY TO BUY MORE.: NEVER TRUE

## 2019-12-27 SDOH — SOCIAL STABILITY: SOCIAL NETWORK: ARE YOU MARRIED, WIDOWED, DIVORCED, SEPARATED, NEVER MARRIED, OR LIVING WITH A PARTNER?: WIDOWED

## 2020-01-01 ENCOUNTER — APPOINTMENT (OUTPATIENT)
Dept: CT IMAGING | Age: 80
DRG: 243 | End: 2020-01-01
Payer: MEDICARE

## 2020-01-01 ENCOUNTER — APPOINTMENT (OUTPATIENT)
Dept: GENERAL RADIOLOGY | Age: 80
DRG: 243 | End: 2020-01-01
Payer: MEDICARE

## 2020-01-01 ENCOUNTER — TELEPHONE (OUTPATIENT)
Dept: PRIMARY CARE CLINIC | Age: 80
End: 2020-01-01

## 2020-01-01 ENCOUNTER — APPOINTMENT (OUTPATIENT)
Dept: CARDIAC CATH/INVASIVE PROCEDURES | Age: 80
DRG: 243 | End: 2020-01-01
Payer: MEDICARE

## 2020-01-01 ENCOUNTER — HOSPITAL ENCOUNTER (INPATIENT)
Age: 80
LOS: 3 days | Discharge: NURSING, REHAB OR LONG-TERM CARE | DRG: 243 | End: 2020-12-05
Attending: INTERNAL MEDICINE | Admitting: INTERNAL MEDICINE
Payer: MEDICARE

## 2020-01-01 VITALS
OXYGEN SATURATION: 94 % | HEART RATE: 71 BPM | BODY MASS INDEX: 50.59 KG/M2 | WEIGHT: 274.91 LBS | TEMPERATURE: 97.7 F | SYSTOLIC BLOOD PRESSURE: 113 MMHG | DIASTOLIC BLOOD PRESSURE: 61 MMHG | HEIGHT: 62 IN | RESPIRATION RATE: 14 BRPM

## 2020-01-01 LAB
-: ABNORMAL
ABO/RH: NORMAL
ALBUMIN SERPL-MCNC: 2.6 G/DL (ref 3.5–5.2)
ALBUMIN SERPL-MCNC: 2.7 G/DL (ref 3.5–5.2)
ALBUMIN/GLOBULIN RATIO: 0.8 (ref 1–2.5)
ALLEN TEST: ABNORMAL
ALP BLD-CCNC: 93 U/L (ref 35–104)
ALT SERPL-CCNC: 18 U/L (ref 5–33)
AMORPHOUS: ABNORMAL
ANION GAP SERPL CALCULATED.3IONS-SCNC: 11 MMOL/L (ref 9–17)
ANION GAP SERPL CALCULATED.3IONS-SCNC: 7 MMOL/L (ref 9–17)
ANTIBODY SCREEN: NEGATIVE
ARM BAND NUMBER: NORMAL
AST SERPL-CCNC: 24 U/L
BACTERIA: ABNORMAL
BILIRUB SERPL-MCNC: 0.3 MG/DL (ref 0.3–1.2)
BILIRUBIN URINE: NEGATIVE
BLOOD BANK SPECIMEN: ABNORMAL
BUN BLDV-MCNC: 24 MG/DL (ref 8–23)
BUN BLDV-MCNC: 29 MG/DL (ref 8–23)
BUN BLDV-MCNC: 32 MG/DL (ref 8–23)
BUN BLDV-MCNC: 37 MG/DL (ref 8–23)
BUN/CREAT BLD: ABNORMAL (ref 9–20)
CALCIUM SERPL-MCNC: 8.3 MG/DL (ref 8.6–10.4)
CALCIUM SERPL-MCNC: 8.4 MG/DL (ref 8.6–10.4)
CALCIUM SERPL-MCNC: 8.8 MG/DL (ref 8.6–10.4)
CARBOXYHEMOGLOBIN: 0.7 % (ref 0–5)
CASTS UA: ABNORMAL /LPF (ref 0–8)
CHLORIDE BLD-SCNC: 102 MMOL/L (ref 98–107)
CHLORIDE BLD-SCNC: 102 MMOL/L (ref 98–107)
CHLORIDE BLD-SCNC: 103 MMOL/L (ref 98–107)
CHLORIDE BLD-SCNC: 103 MMOL/L (ref 98–107)
CHP ED QC CHECK: NORMAL
CO2: 22 MMOL/L (ref 20–31)
CO2: 23 MMOL/L (ref 20–31)
CO2: 26 MMOL/L (ref 20–31)
CO2: 28 MMOL/L (ref 20–31)
COLOR: YELLOW
COMMENT UA: ABNORMAL
CREAT SERPL-MCNC: 0.96 MG/DL (ref 0.5–0.9)
CREAT SERPL-MCNC: 1.01 MG/DL (ref 0.5–0.9)
CREAT SERPL-MCNC: 1.04 MG/DL (ref 0.5–0.9)
CREAT SERPL-MCNC: 1.14 MG/DL (ref 0.5–0.9)
CRYSTALS, UA: ABNORMAL /HPF
CULTURE: ABNORMAL
EKG ATRIAL RATE: 44 BPM
EKG ATRIAL RATE: 68 BPM
EKG ATRIAL RATE: 75 BPM
EKG P AXIS: 52 DEGREES
EKG P AXIS: 74 DEGREES
EKG P-R INTERVAL: 144 MS
EKG P-R INTERVAL: 182 MS
EKG Q-T INTERVAL: 450 MS
EKG Q-T INTERVAL: 486 MS
EKG Q-T INTERVAL: 510 MS
EKG QRS DURATION: 106 MS
EKG QRS DURATION: 160 MS
EKG QRS DURATION: 172 MS
EKG QTC CALCULATION (BAZETT): 478 MS
EKG QTC CALCULATION (BAZETT): 542 MS
EKG QTC CALCULATION (BAZETT): 617 MS
EKG R AXIS: -65 DEGREES
EKG R AXIS: -66 DEGREES
EKG R AXIS: -85 DEGREES
EKG T AXIS: 10 DEGREES
EKG T AXIS: 107 DEGREES
EKG T AXIS: 42 DEGREES
EKG VENTRICULAR RATE: 68 BPM
EKG VENTRICULAR RATE: 75 BPM
EKG VENTRICULAR RATE: 88 BPM
EPITHELIAL CELLS UA: ABNORMAL /HPF (ref 0–5)
ESTIMATED AVERAGE GLUCOSE: 111 MG/DL
ETHANOL PERCENT: <0.01 %
ETHANOL: <10 MG/DL
EXPIRATION DATE: NORMAL
FIO2: ABNORMAL
GFR AFRICAN AMERICAN: >60 ML/MIN
GFR AFRICAN AMERICAN: ABNORMAL ML/MIN
GFR NON-AFRICAN AMERICAN: 51 ML/MIN
GFR NON-AFRICAN AMERICAN: 53 ML/MIN
GFR NON-AFRICAN AMERICAN: 56 ML/MIN
GFR NON-AFRICAN AMERICAN: ABNORMAL ML/MIN
GFR SERPL CREATININE-BSD FRML MDRD: ABNORMAL ML/MIN/{1.73_M2}
GLUCOSE BLD-MCNC: 109 MG/DL (ref 65–105)
GLUCOSE BLD-MCNC: 114 MG/DL (ref 70–99)
GLUCOSE BLD-MCNC: 121 MG/DL (ref 65–105)
GLUCOSE BLD-MCNC: 133 MG/DL
GLUCOSE BLD-MCNC: 133 MG/DL (ref 65–105)
GLUCOSE BLD-MCNC: 163 MG/DL (ref 65–105)
GLUCOSE BLD-MCNC: 168 MG/DL (ref 65–105)
GLUCOSE BLD-MCNC: 169 MG/DL (ref 70–99)
GLUCOSE BLD-MCNC: 175 MG/DL (ref 65–105)
GLUCOSE BLD-MCNC: 180 MG/DL (ref 65–105)
GLUCOSE BLD-MCNC: 180 MG/DL (ref 65–105)
GLUCOSE BLD-MCNC: 183 MG/DL
GLUCOSE BLD-MCNC: 183 MG/DL (ref 65–105)
GLUCOSE BLD-MCNC: 191 MG/DL
GLUCOSE BLD-MCNC: 191 MG/DL (ref 65–105)
GLUCOSE BLD-MCNC: 241 MG/DL (ref 65–105)
GLUCOSE BLD-MCNC: 25 MG/DL (ref 65–105)
GLUCOSE BLD-MCNC: 251 MG/DL (ref 65–105)
GLUCOSE BLD-MCNC: 32 MG/DL (ref 70–99)
GLUCOSE BLD-MCNC: 55 MG/DL (ref 65–105)
GLUCOSE BLD-MCNC: 66 MG/DL (ref 65–105)
GLUCOSE BLD-MCNC: 70 MG/DL (ref 65–105)
GLUCOSE BLD-MCNC: 80 MG/DL
GLUCOSE BLD-MCNC: 80 MG/DL (ref 65–105)
GLUCOSE BLD-MCNC: 82 MG/DL (ref 65–105)
GLUCOSE BLD-MCNC: 84 MG/DL (ref 65–105)
GLUCOSE BLD-MCNC: 90 MG/DL (ref 65–105)
GLUCOSE BLD-MCNC: 91 MG/DL (ref 65–105)
GLUCOSE BLD-MCNC: 92 MG/DL
GLUCOSE BLD-MCNC: 92 MG/DL (ref 65–105)
GLUCOSE BLD-MCNC: 98 MG/DL (ref 70–99)
GLUCOSE URINE: NEGATIVE
HBA1C MFR BLD: 5.5 % (ref 4–6)
HCG QUALITATIVE: NEGATIVE
HCO3 VENOUS: 28.6 MMOL/L (ref 24–30)
HCT VFR BLD CALC: 34.5 % (ref 36.3–47.1)
HCT VFR BLD CALC: 35.7 % (ref 36.3–47.1)
HCT VFR BLD CALC: 36.6 % (ref 36.3–47.1)
HCT VFR BLD CALC: 41.3 % (ref 36.3–47.1)
HEMOGLOBIN: 10.6 G/DL (ref 11.9–15.1)
HEMOGLOBIN: 10.8 G/DL (ref 11.9–15.1)
HEMOGLOBIN: 11.4 G/DL (ref 11.9–15.1)
HEMOGLOBIN: 12.9 G/DL (ref 11.9–15.1)
INR BLD: 1
INR BLD: 1
KEPPRA: 43 UG/ML
KETONES, URINE: NEGATIVE
LEUKOCYTE ESTERASE, URINE: ABNORMAL
LV EF: 53 %
LVEF MODALITY: NORMAL
Lab: ABNORMAL
MAGNESIUM: 2 MG/DL (ref 1.6–2.6)
MAGNESIUM: 2 MG/DL (ref 1.6–2.6)
MCH RBC QN AUTO: 31 PG (ref 25.2–33.5)
MCH RBC QN AUTO: 31 PG (ref 25.2–33.5)
MCH RBC QN AUTO: 31.3 PG (ref 25.2–33.5)
MCH RBC QN AUTO: 31.5 PG (ref 25.2–33.5)
MCHC RBC AUTO-ENTMCNC: 30.3 G/DL (ref 28.4–34.8)
MCHC RBC AUTO-ENTMCNC: 30.7 G/DL (ref 28.4–34.8)
MCHC RBC AUTO-ENTMCNC: 31.1 G/DL (ref 28.4–34.8)
MCHC RBC AUTO-ENTMCNC: 31.2 G/DL (ref 28.4–34.8)
MCV RBC AUTO: 100.5 FL (ref 82.6–102.9)
MCV RBC AUTO: 102.4 FL (ref 82.6–102.9)
MCV RBC AUTO: 102.6 FL (ref 82.6–102.9)
MCV RBC AUTO: 99.3 FL (ref 82.6–102.9)
METHEMOGLOBIN: ABNORMAL % (ref 0–1.5)
MODE: ABNORMAL
MUCUS: ABNORMAL
MYOGLOBIN: 244 NG/ML (ref 25–58)
NEGATIVE BASE EXCESS, VEN: ABNORMAL MMOL/L (ref 0–2)
NITRITE, URINE: POSITIVE
NOTIFICATION TIME: ABNORMAL
NOTIFICATION: ABNORMAL
NRBC AUTOMATED: 0 PER 100 WBC
O2 DEVICE/FLOW/%: ABNORMAL
O2 SAT, VEN: 53.9 % (ref 60–85)
OTHER OBSERVATIONS UA: ABNORMAL
OXYHEMOGLOBIN: ABNORMAL % (ref 95–98)
PARTIAL THROMBOPLASTIN TIME: 26.7 SEC (ref 20.5–30.5)
PATIENT TEMP: 37
PCO2, VEN, TEMP ADJ: ABNORMAL MMHG (ref 39–55)
PCO2, VEN: 64 (ref 39–55)
PDW BLD-RTO: 13.5 % (ref 11.8–14.4)
PDW BLD-RTO: 13.6 % (ref 11.8–14.4)
PEEP/CPAP: ABNORMAL
PH UA: 5 (ref 5–8)
PH VENOUS: 7.27 (ref 7.32–7.42)
PH, VEN, TEMP ADJ: ABNORMAL (ref 7.32–7.42)
PHOSPHORUS: 2.8 MG/DL (ref 2.6–4.5)
PLATELET # BLD: 204 K/UL (ref 138–453)
PLATELET # BLD: 209 K/UL (ref 138–453)
PLATELET # BLD: 222 K/UL (ref 138–453)
PLATELET # BLD: 225 K/UL (ref 138–453)
PMV BLD AUTO: 10.5 FL (ref 8.1–13.5)
PMV BLD AUTO: 9.9 FL (ref 8.1–13.5)
PO2, VEN, TEMP ADJ: ABNORMAL MMHG (ref 30–50)
PO2, VEN: 33.4 (ref 30–50)
POSITIVE BASE EXCESS, VEN: 0.7 MMOL/L (ref 0–2)
POTASSIUM SERPL-SCNC: 4.9 MMOL/L (ref 3.7–5.3)
POTASSIUM SERPL-SCNC: 5 MMOL/L (ref 3.7–5.3)
POTASSIUM SERPL-SCNC: 5.2 MMOL/L (ref 3.7–5.3)
POTASSIUM SERPL-SCNC: 5.4 MMOL/L (ref 3.7–5.3)
PROCALCITONIN: 0.09 NG/ML
PROTEIN UA: NEGATIVE
PROTHROMBIN TIME: 10.5 SEC (ref 9–12)
PROTHROMBIN TIME: 10.6 SEC (ref 9–12)
PSV: ABNORMAL
PT. POSITION: ABNORMAL
RBC # BLD: 3.37 M/UL (ref 3.95–5.11)
RBC # BLD: 3.48 M/UL (ref 3.95–5.11)
RBC # BLD: 3.64 M/UL (ref 3.95–5.11)
RBC # BLD: 4.16 M/UL (ref 3.95–5.11)
RBC UA: ABNORMAL /HPF (ref 0–4)
RENAL EPITHELIAL, UA: ABNORMAL /HPF
RESPIRATORY RATE: ABNORMAL
SAMPLE SITE: ABNORMAL
SARS-COV-2, RAPID: NOT DETECTED
SARS-COV-2: NORMAL
SARS-COV-2: NORMAL
SET RATE: ABNORMAL
SODIUM BLD-SCNC: 133 MMOL/L (ref 135–144)
SODIUM BLD-SCNC: 135 MMOL/L (ref 135–144)
SODIUM BLD-SCNC: 136 MMOL/L (ref 135–144)
SODIUM BLD-SCNC: 137 MMOL/L (ref 135–144)
SOURCE: NORMAL
SPECIFIC GRAVITY UA: 1.02 (ref 1–1.03)
SPECIMEN DESCRIPTION: ABNORMAL
TEXT FOR RESPIRATORY: ABNORMAL
THYROXINE, FREE: 1.08 NG/DL (ref 0.93–1.7)
TOTAL CK: 67 U/L (ref 26–192)
TOTAL HB: ABNORMAL G/DL (ref 12–16)
TOTAL PROTEIN: 6.2 G/DL (ref 6.4–8.3)
TOTAL RATE: ABNORMAL
TRICHOMONAS: ABNORMAL
TROPONIN INTERP: ABNORMAL
TROPONIN T: ABNORMAL NG/ML
TROPONIN, HIGH SENSITIVITY: 29 NG/L (ref 0–14)
TROPONIN, HIGH SENSITIVITY: 29 NG/L (ref 0–14)
TROPONIN, HIGH SENSITIVITY: 30 NG/L (ref 0–14)
TROPONIN, HIGH SENSITIVITY: 38 NG/L (ref 0–14)
TROPONIN, HIGH SENSITIVITY: 49 NG/L (ref 0–14)
TROPONIN, HIGH SENSITIVITY: 50 NG/L (ref 0–14)
TSH SERPL DL<=0.05 MIU/L-ACNC: 12.01 MIU/L (ref 0.3–5)
TURBIDITY: CLEAR
URINE HGB: NEGATIVE
UROBILINOGEN, URINE: NORMAL
VT: ABNORMAL
WBC # BLD: 11.1 K/UL (ref 3.5–11.3)
WBC # BLD: 12 K/UL (ref 3.5–11.3)
WBC # BLD: 14.5 K/UL (ref 3.5–11.3)
WBC # BLD: 9.5 K/UL (ref 3.5–11.3)
WBC UA: ABNORMAL /HPF (ref 0–5)
YEAST: ABNORMAL

## 2020-01-01 PROCEDURE — 99239 HOSP IP/OBS DSCHRG MGMT >30: CPT | Performed by: INTERNAL MEDICINE

## 2020-01-01 PROCEDURE — 2709999900 HC NON-CHARGEABLE SUPPLY

## 2020-01-01 PROCEDURE — 2580000003 HC RX 258: Performed by: STUDENT IN AN ORGANIZED HEALTH CARE EDUCATION/TRAINING PROGRAM

## 2020-01-01 PROCEDURE — 3209999900 CT LUMBAR SPINE TRAUMA RECONSTRUCTION

## 2020-01-01 PROCEDURE — 36415 COLL VENOUS BLD VENIPUNCTURE: CPT

## 2020-01-01 PROCEDURE — 85027 COMPLETE CBC AUTOMATED: CPT

## 2020-01-01 PROCEDURE — 6360000002 HC RX W HCPCS: Performed by: STUDENT IN AN ORGANIZED HEALTH CARE EDUCATION/TRAINING PROGRAM

## 2020-01-01 PROCEDURE — 2580000003 HC RX 258: Performed by: NURSE PRACTITIONER

## 2020-01-01 PROCEDURE — 93306 TTE W/DOPPLER COMPLETE: CPT

## 2020-01-01 PROCEDURE — 86900 BLOOD TYPING SEROLOGIC ABO: CPT

## 2020-01-01 PROCEDURE — 84703 CHORIONIC GONADOTROPIN ASSAY: CPT

## 2020-01-01 PROCEDURE — 84484 ASSAY OF TROPONIN QUANT: CPT

## 2020-01-01 PROCEDURE — 2000000000 HC ICU R&B

## 2020-01-01 PROCEDURE — 86901 BLOOD TYPING SEROLOGIC RH(D): CPT

## 2020-01-01 PROCEDURE — 86850 RBC ANTIBODY SCREEN: CPT

## 2020-01-01 PROCEDURE — 99283 EMERGENCY DEPT VISIT LOW MDM: CPT

## 2020-01-01 PROCEDURE — 02HK3JZ INSERTION OF PACEMAKER LEAD INTO RIGHT VENTRICLE, PERCUTANEOUS APPROACH: ICD-10-PCS | Performed by: INTERNAL MEDICINE

## 2020-01-01 PROCEDURE — 6370000000 HC RX 637 (ALT 250 FOR IP): Performed by: STUDENT IN AN ORGANIZED HEALTH CARE EDUCATION/TRAINING PROGRAM

## 2020-01-01 PROCEDURE — 99222 1ST HOSP IP/OBS MODERATE 55: CPT | Performed by: NURSE PRACTITIONER

## 2020-01-01 PROCEDURE — 72129 CT CHEST SPINE W/DYE: CPT

## 2020-01-01 PROCEDURE — 95816 EEG AWAKE AND DROWSY: CPT | Performed by: PSYCHIATRY & NEUROLOGY

## 2020-01-01 PROCEDURE — 83036 HEMOGLOBIN GLYCOSYLATED A1C: CPT

## 2020-01-01 PROCEDURE — 87088 URINE BACTERIA CULTURE: CPT

## 2020-01-01 PROCEDURE — 99232 SBSQ HOSP IP/OBS MODERATE 35: CPT | Performed by: INTERNAL MEDICINE

## 2020-01-01 PROCEDURE — 80069 RENAL FUNCTION PANEL: CPT

## 2020-01-01 PROCEDURE — 6360000004 HC RX CONTRAST MEDICATION: Performed by: EMERGENCY MEDICINE

## 2020-01-01 PROCEDURE — C1769 GUIDE WIRE: HCPCS

## 2020-01-01 PROCEDURE — 33210 INSERT ELECTRD/PM CATH SNGL: CPT | Performed by: INTERNAL MEDICINE

## 2020-01-01 PROCEDURE — 93005 ELECTROCARDIOGRAM TRACING: CPT | Performed by: EMERGENCY MEDICINE

## 2020-01-01 PROCEDURE — 82550 ASSAY OF CK (CPK): CPT

## 2020-01-01 PROCEDURE — 83735 ASSAY OF MAGNESIUM: CPT

## 2020-01-01 PROCEDURE — 6370000000 HC RX 637 (ALT 250 FOR IP): Performed by: NURSE PRACTITIONER

## 2020-01-01 PROCEDURE — 87086 URINE CULTURE/COLONY COUNT: CPT

## 2020-01-01 PROCEDURE — 95816 EEG AWAKE AND DROWSY: CPT

## 2020-01-01 PROCEDURE — 73020 X-RAY EXAM OF SHOULDER: CPT

## 2020-01-01 PROCEDURE — 97530 THERAPEUTIC ACTIVITIES: CPT

## 2020-01-01 PROCEDURE — G0480 DRUG TEST DEF 1-7 CLASSES: HCPCS

## 2020-01-01 PROCEDURE — 85610 PROTHROMBIN TIME: CPT

## 2020-01-01 PROCEDURE — 99222 1ST HOSP IP/OBS MODERATE 55: CPT | Performed by: PSYCHIATRY & NEUROLOGY

## 2020-01-01 PROCEDURE — 97535 SELF CARE MNGMENT TRAINING: CPT

## 2020-01-01 PROCEDURE — 82947 ASSAY GLUCOSE BLOOD QUANT: CPT

## 2020-01-01 PROCEDURE — 84439 ASSAY OF FREE THYROXINE: CPT

## 2020-01-01 PROCEDURE — 6360000002 HC RX W HCPCS: Performed by: NURSE PRACTITIONER

## 2020-01-01 PROCEDURE — 83874 ASSAY OF MYOGLOBIN: CPT

## 2020-01-01 PROCEDURE — 82565 ASSAY OF CREATININE: CPT

## 2020-01-01 PROCEDURE — 97163 PT EVAL HIGH COMPLEX 45 MIN: CPT

## 2020-01-01 PROCEDURE — C1785 PMKR, DUAL, RATE-RESP: HCPCS

## 2020-01-01 PROCEDURE — 2500000003 HC RX 250 WO HCPCS

## 2020-01-01 PROCEDURE — 2580000003 HC RX 258: Performed by: EMERGENCY MEDICINE

## 2020-01-01 PROCEDURE — 80053 COMPREHEN METABOLIC PANEL: CPT

## 2020-01-01 PROCEDURE — 81001 URINALYSIS AUTO W/SCOPE: CPT

## 2020-01-01 PROCEDURE — 33208 INSRT HEART PM ATRIAL & VENT: CPT | Performed by: INTERNAL MEDICINE

## 2020-01-01 PROCEDURE — C1898 LEAD, PMKR, OTHER THAN TRANS: HCPCS

## 2020-01-01 PROCEDURE — 85730 THROMBOPLASTIN TIME PARTIAL: CPT

## 2020-01-01 PROCEDURE — 2060000000 HC ICU INTERMEDIATE R&B

## 2020-01-01 PROCEDURE — 99233 SBSQ HOSP IP/OBS HIGH 50: CPT | Performed by: INTERNAL MEDICINE

## 2020-01-01 PROCEDURE — 80051 ELECTROLYTE PANEL: CPT

## 2020-01-01 PROCEDURE — U0002 COVID-19 LAB TEST NON-CDC: HCPCS

## 2020-01-01 PROCEDURE — 6360000002 HC RX W HCPCS

## 2020-01-01 PROCEDURE — 73030 X-RAY EXAM OF SHOULDER: CPT

## 2020-01-01 PROCEDURE — 84443 ASSAY THYROID STIM HORMONE: CPT

## 2020-01-01 PROCEDURE — C1894 INTRO/SHEATH, NON-LASER: HCPCS

## 2020-01-01 PROCEDURE — 72131 CT LUMBAR SPINE W/O DYE: CPT

## 2020-01-01 PROCEDURE — 0JH606Z INSERTION OF PACEMAKER, DUAL CHAMBER INTO CHEST SUBCUTANEOUS TISSUE AND FASCIA, OPEN APPROACH: ICD-10-PCS | Performed by: INTERNAL MEDICINE

## 2020-01-01 PROCEDURE — 82805 BLOOD GASES W/O2 SATURATION: CPT

## 2020-01-01 PROCEDURE — 72125 CT NECK SPINE W/O DYE: CPT

## 2020-01-01 PROCEDURE — 84145 PROCALCITONIN (PCT): CPT

## 2020-01-01 PROCEDURE — 02H63JZ INSERTION OF PACEMAKER LEAD INTO RIGHT ATRIUM, PERCUTANEOUS APPROACH: ICD-10-PCS | Performed by: INTERNAL MEDICINE

## 2020-01-01 PROCEDURE — 97166 OT EVAL MOD COMPLEX 45 MIN: CPT

## 2020-01-01 PROCEDURE — 80177 DRUG SCRN QUAN LEVETIRACETAM: CPT

## 2020-01-01 PROCEDURE — 87186 SC STD MICRODIL/AGAR DIL: CPT

## 2020-01-01 PROCEDURE — 74177 CT ABD & PELVIS W/CONTRAST: CPT

## 2020-01-01 PROCEDURE — 6360000002 HC RX W HCPCS: Performed by: EMERGENCY MEDICINE

## 2020-01-01 PROCEDURE — 84520 ASSAY OF UREA NITROGEN: CPT

## 2020-01-01 PROCEDURE — 70450 CT HEAD/BRAIN W/O DYE: CPT

## 2020-01-01 PROCEDURE — 3209999900 CT THORACIC SPINE TRAUMA RECONSTRUCTION

## 2020-01-01 PROCEDURE — 71045 X-RAY EXAM CHEST 1 VIEW: CPT

## 2020-01-01 PROCEDURE — 80048 BASIC METABOLIC PNL TOTAL CA: CPT

## 2020-01-01 PROCEDURE — 5A1223Z PERFORMANCE OF CARDIAC PACING, CONTINUOUS: ICD-10-PCS | Performed by: INTERNAL MEDICINE

## 2020-01-01 PROCEDURE — 6360000002 HC RX W HCPCS: Performed by: FAMILY MEDICINE

## 2020-01-01 RX ORDER — SODIUM CHLORIDE 0.9 % (FLUSH) 0.9 %
10 SYRINGE (ML) INJECTION PRN
Status: DISCONTINUED | OUTPATIENT
Start: 2020-01-01 | End: 2020-01-01 | Stop reason: HOSPADM

## 2020-01-01 RX ORDER — DEXTROSE MONOHYDRATE 25 G/50ML
12.5 INJECTION, SOLUTION INTRAVENOUS PRN
Status: DISCONTINUED | OUTPATIENT
Start: 2020-01-01 | End: 2020-01-01 | Stop reason: HOSPADM

## 2020-01-01 RX ORDER — ONDANSETRON 2 MG/ML
4 INJECTION INTRAMUSCULAR; INTRAVENOUS EVERY 6 HOURS PRN
Status: DISCONTINUED | OUTPATIENT
Start: 2020-01-01 | End: 2020-01-01 | Stop reason: HOSPADM

## 2020-01-01 RX ORDER — ATORVASTATIN CALCIUM 10 MG/1
10 TABLET, FILM COATED ORAL DAILY
Qty: 30 TABLET | Refills: 3 | Status: SHIPPED | OUTPATIENT
Start: 2020-01-01

## 2020-01-01 RX ORDER — POLYETHYLENE GLYCOL 3350 17 G/17G
17 POWDER, FOR SOLUTION ORAL DAILY PRN
Status: DISCONTINUED | OUTPATIENT
Start: 2020-01-01 | End: 2020-01-01 | Stop reason: HOSPADM

## 2020-01-01 RX ORDER — MORPHINE SULFATE 2 MG/ML
2 INJECTION, SOLUTION INTRAMUSCULAR; INTRAVENOUS EVERY 4 HOURS PRN
Status: DISCONTINUED | OUTPATIENT
Start: 2020-01-01 | End: 2020-01-01 | Stop reason: HOSPADM

## 2020-01-01 RX ORDER — SPIRONOLACTONE 25 MG/1
25 TABLET ORAL DAILY
Status: DISCONTINUED | OUTPATIENT
Start: 2020-01-01 | End: 2020-01-01

## 2020-01-01 RX ORDER — POTASSIUM CHLORIDE 20 MEQ/1
40 TABLET, EXTENDED RELEASE ORAL PRN
Status: DISCONTINUED | OUTPATIENT
Start: 2020-01-01 | End: 2020-01-01 | Stop reason: HOSPADM

## 2020-01-01 RX ORDER — ACETAMINOPHEN 325 MG/1
650 TABLET ORAL EVERY 6 HOURS PRN
Status: DISCONTINUED | OUTPATIENT
Start: 2020-01-01 | End: 2020-01-01 | Stop reason: HOSPADM

## 2020-01-01 RX ORDER — DEXTROSE MONOHYDRATE 25 G/50ML
25 INJECTION, SOLUTION INTRAVENOUS ONCE
Status: COMPLETED | OUTPATIENT
Start: 2020-01-01 | End: 2020-01-01

## 2020-01-01 RX ORDER — FUROSEMIDE 10 MG/ML
20 INJECTION INTRAMUSCULAR; INTRAVENOUS ONCE
Status: COMPLETED | OUTPATIENT
Start: 2020-01-01 | End: 2020-01-01

## 2020-01-01 RX ORDER — ONDANSETRON 2 MG/ML
4 INJECTION INTRAMUSCULAR; INTRAVENOUS EVERY 6 HOURS PRN
Status: DISCONTINUED | OUTPATIENT
Start: 2020-01-01 | End: 2020-01-01 | Stop reason: SDUPTHER

## 2020-01-01 RX ORDER — ACETAMINOPHEN 650 MG/1
650 SUPPOSITORY RECTAL EVERY 6 HOURS PRN
Status: DISCONTINUED | OUTPATIENT
Start: 2020-01-01 | End: 2020-01-01 | Stop reason: HOSPADM

## 2020-01-01 RX ORDER — ATROPINE SULFATE 0.1 MG/ML
1 INJECTION INTRAVENOUS ONCE
Status: DISCONTINUED | OUTPATIENT
Start: 2020-01-01 | End: 2020-01-01 | Stop reason: HOSPADM

## 2020-01-01 RX ORDER — LEVETIRACETAM 500 MG/1
1000 TABLET ORAL 2 TIMES DAILY
Status: DISCONTINUED | OUTPATIENT
Start: 2020-01-01 | End: 2020-01-01 | Stop reason: HOSPADM

## 2020-01-01 RX ORDER — CLOPIDOGREL BISULFATE 75 MG/1
75 TABLET ORAL DAILY
Status: DISCONTINUED | OUTPATIENT
Start: 2020-01-01 | End: 2020-01-01 | Stop reason: HOSPADM

## 2020-01-01 RX ORDER — DEXTROSE MONOHYDRATE 50 MG/ML
100 INJECTION, SOLUTION INTRAVENOUS PRN
Status: DISCONTINUED | OUTPATIENT
Start: 2020-01-01 | End: 2020-01-01 | Stop reason: HOSPADM

## 2020-01-01 RX ORDER — SODIUM CHLORIDE 0.9 % (FLUSH) 0.9 %
10 SYRINGE (ML) INJECTION EVERY 12 HOURS SCHEDULED
Status: DISCONTINUED | OUTPATIENT
Start: 2020-01-01 | End: 2020-01-01 | Stop reason: HOSPADM

## 2020-01-01 RX ORDER — ASPIRIN 81 MG/1
81 TABLET ORAL DAILY
Status: DISCONTINUED | OUTPATIENT
Start: 2020-01-01 | End: 2020-01-01 | Stop reason: HOSPADM

## 2020-01-01 RX ORDER — DEXTROSE MONOHYDRATE 25 G/50ML
12.5 INJECTION, SOLUTION INTRAVENOUS ONCE
Status: COMPLETED | OUTPATIENT
Start: 2020-01-01 | End: 2020-01-01

## 2020-01-01 RX ORDER — LORAZEPAM 2 MG/ML
INJECTION INTRAMUSCULAR
Status: DISCONTINUED
Start: 2020-01-01 | End: 2020-01-01 | Stop reason: WASHOUT

## 2020-01-01 RX ORDER — NICOTINE POLACRILEX 4 MG
15 LOZENGE BUCCAL PRN
Status: DISCONTINUED | OUTPATIENT
Start: 2020-01-01 | End: 2020-01-01 | Stop reason: HOSPADM

## 2020-01-01 RX ORDER — POTASSIUM CHLORIDE 7.45 MG/ML
10 INJECTION INTRAVENOUS PRN
Status: DISCONTINUED | OUTPATIENT
Start: 2020-01-01 | End: 2020-01-01 | Stop reason: HOSPADM

## 2020-01-01 RX ORDER — PROMETHAZINE HYDROCHLORIDE 12.5 MG/1
12.5 TABLET ORAL EVERY 6 HOURS PRN
Status: DISCONTINUED | OUTPATIENT
Start: 2020-01-01 | End: 2020-01-01 | Stop reason: HOSPADM

## 2020-01-01 RX ORDER — ASPIRIN 81 MG/1
81 TABLET ORAL DAILY
Qty: 30 TABLET | Refills: 3 | Status: SHIPPED | OUTPATIENT
Start: 2020-01-01 | End: 2021-01-01 | Stop reason: SDUPTHER

## 2020-01-01 RX ORDER — VANCOMYCIN HYDROCHLORIDE 1 G/200ML
1000 INJECTION, SOLUTION INTRAVENOUS ONCE
Status: DISCONTINUED | OUTPATIENT
Start: 2020-01-01 | End: 2020-01-01 | Stop reason: HOSPADM

## 2020-01-01 RX ORDER — CLOPIDOGREL BISULFATE 75 MG/1
75 TABLET ORAL DAILY
Qty: 30 TABLET | Refills: 3 | Status: SHIPPED | OUTPATIENT
Start: 2020-01-01 | End: 2021-01-01 | Stop reason: SDUPTHER

## 2020-01-01 RX ORDER — MAGNESIUM SULFATE 1 G/100ML
1 INJECTION INTRAVENOUS PRN
Status: DISCONTINUED | OUTPATIENT
Start: 2020-01-01 | End: 2020-01-01 | Stop reason: HOSPADM

## 2020-01-01 RX ORDER — FUROSEMIDE 20 MG/1
40 TABLET ORAL DAILY
Status: DISCONTINUED | OUTPATIENT
Start: 2020-01-01 | End: 2020-01-01

## 2020-01-01 RX ORDER — NICOTINE 21 MG/24HR
1 PATCH, TRANSDERMAL 24 HOURS TRANSDERMAL DAILY PRN
Status: DISCONTINUED | OUTPATIENT
Start: 2020-01-01 | End: 2020-01-01 | Stop reason: HOSPADM

## 2020-01-01 RX ORDER — DEXTROSE MONOHYDRATE 25 G/50ML
INJECTION, SOLUTION INTRAVENOUS
Status: DISPENSED
Start: 2020-01-01 | End: 2020-01-01

## 2020-01-01 RX ORDER — DOPAMINE HYDROCHLORIDE 160 MG/100ML
5 INJECTION, SOLUTION INTRAVENOUS CONTINUOUS
Status: DISCONTINUED | OUTPATIENT
Start: 2020-01-01 | End: 2020-01-01

## 2020-01-01 RX ORDER — ATORVASTATIN CALCIUM 10 MG/1
10 TABLET, FILM COATED ORAL DAILY
Status: DISCONTINUED | OUTPATIENT
Start: 2020-01-01 | End: 2020-01-01 | Stop reason: HOSPADM

## 2020-01-01 RX ORDER — LEVETIRACETAM 1000 MG/1
1000 TABLET ORAL 2 TIMES DAILY
Qty: 60 TABLET | Refills: 3 | Status: SHIPPED | OUTPATIENT
Start: 2020-01-01 | End: 2021-01-01 | Stop reason: SDUPTHER

## 2020-01-01 RX ORDER — DEXTROSE AND SODIUM CHLORIDE 5; .9 G/100ML; G/100ML
INJECTION, SOLUTION INTRAVENOUS
Status: DISPENSED
Start: 2020-01-01 | End: 2020-01-01

## 2020-01-01 RX ORDER — DEXTROSE MONOHYDRATE 100 MG/ML
INJECTION, SOLUTION INTRAVENOUS CONTINUOUS
Status: DISCONTINUED | OUTPATIENT
Start: 2020-01-01 | End: 2020-01-01

## 2020-01-01 RX ORDER — GLIPIZIDE 10 MG/1
10 TABLET ORAL
Status: DISCONTINUED | OUTPATIENT
Start: 2020-01-01 | End: 2020-01-01

## 2020-01-01 RX ORDER — LEVETIRACETAM 5 MG/ML
500 INJECTION INTRAVASCULAR ONCE
Status: COMPLETED | OUTPATIENT
Start: 2020-01-01 | End: 2020-01-01

## 2020-01-01 RX ADMIN — Medication 81 MG: at 18:34

## 2020-01-01 RX ADMIN — ENOXAPARIN SODIUM 40 MG: 40 INJECTION SUBCUTANEOUS at 18:34

## 2020-01-01 RX ADMIN — DEXTROSE MONOHYDRATE: 100 INJECTION, SOLUTION INTRAVENOUS at 21:54

## 2020-01-01 RX ADMIN — SODIUM CHLORIDE, PRESERVATIVE FREE 10 ML: 5 INJECTION INTRAVENOUS at 08:55

## 2020-01-01 RX ADMIN — Medication 81 MG: at 09:10

## 2020-01-01 RX ADMIN — CLOPIDOGREL 75 MG: 75 TABLET, FILM COATED ORAL at 09:53

## 2020-01-01 RX ADMIN — CLOPIDOGREL 75 MG: 75 TABLET, FILM COATED ORAL at 18:34

## 2020-01-01 RX ADMIN — SODIUM CHLORIDE, PRESERVATIVE FREE 10 ML: 5 INJECTION INTRAVENOUS at 21:44

## 2020-01-01 RX ADMIN — ATORVASTATIN CALCIUM 10 MG: 10 TABLET, FILM COATED ORAL at 08:55

## 2020-01-01 RX ADMIN — SODIUM CHLORIDE, PRESERVATIVE FREE 10 ML: 5 INJECTION INTRAVENOUS at 08:22

## 2020-01-01 RX ADMIN — INSULIN HUMAN 10 UNITS: 100 INJECTION, SOLUTION PARENTERAL at 06:17

## 2020-01-01 RX ADMIN — ATORVASTATIN CALCIUM 10 MG: 10 TABLET, FILM COATED ORAL at 09:10

## 2020-01-01 RX ADMIN — Medication 81 MG: at 08:55

## 2020-01-01 RX ADMIN — LEVETIRACETAM 1000 MG: 500 TABLET, FILM COATED ORAL at 22:08

## 2020-01-01 RX ADMIN — IOPAMIDOL 130 ML: 755 INJECTION, SOLUTION INTRAVENOUS at 11:09

## 2020-01-01 RX ADMIN — DEXTROSE MONOHYDRATE 12.5 G: 25 INJECTION, SOLUTION INTRAVENOUS at 15:03

## 2020-01-01 RX ADMIN — Medication 81 MG: at 08:20

## 2020-01-01 RX ADMIN — ACETAMINOPHEN 650 MG: 325 TABLET ORAL at 21:42

## 2020-01-01 RX ADMIN — LEVETIRACETAM 1000 MG: 500 TABLET, FILM COATED ORAL at 21:42

## 2020-01-01 RX ADMIN — SODIUM CHLORIDE, PRESERVATIVE FREE 10 ML: 5 INJECTION INTRAVENOUS at 21:29

## 2020-01-01 RX ADMIN — LEVETIRACETAM 1000 MG: 500 TABLET, FILM COATED ORAL at 21:43

## 2020-01-01 RX ADMIN — LEVETIRACETAM 1000 MG: 500 TABLET, FILM COATED ORAL at 08:19

## 2020-01-01 RX ADMIN — DEXTROSE MONOHYDRATE 12.5 G: 25 INJECTION, SOLUTION INTRAVENOUS at 07:48

## 2020-01-01 RX ADMIN — SPIRONOLACTONE 25 MG: 25 TABLET ORAL at 21:27

## 2020-01-01 RX ADMIN — FUROSEMIDE 20 MG: 10 INJECTION, SOLUTION INTRAMUSCULAR; INTRAVENOUS at 15:04

## 2020-01-01 RX ADMIN — METOPROLOL TARTRATE 12.5 MG: 25 TABLET ORAL at 21:28

## 2020-01-01 RX ADMIN — CLOPIDOGREL 75 MG: 75 TABLET, FILM COATED ORAL at 08:20

## 2020-01-01 RX ADMIN — CLOPIDOGREL 75 MG: 75 TABLET, FILM COATED ORAL at 09:11

## 2020-01-01 RX ADMIN — LEVETIRACETAM 1000 MG: 500 TABLET, FILM COATED ORAL at 09:10

## 2020-01-01 RX ADMIN — DEXTROSE MONOHYDRATE 25 G: 25 INJECTION, SOLUTION INTRAVENOUS at 06:17

## 2020-01-01 RX ADMIN — SODIUM CHLORIDE, PRESERVATIVE FREE 10 ML: 5 INJECTION INTRAVENOUS at 09:10

## 2020-01-01 RX ADMIN — ATORVASTATIN CALCIUM 10 MG: 10 TABLET, FILM COATED ORAL at 21:28

## 2020-01-01 RX ADMIN — SODIUM CHLORIDE, PRESERVATIVE FREE 10 ML: 5 INJECTION INTRAVENOUS at 08:20

## 2020-01-01 RX ADMIN — LEVETIRACETAM 500 MG: 5 INJECTION INTRAVENOUS at 11:36

## 2020-01-01 RX ADMIN — DOPAMINE HYDROCHLORIDE 5 MCG/KG/MIN: 160 INJECTION, SOLUTION INTRAVENOUS at 13:24

## 2020-01-01 RX ADMIN — SODIUM CHLORIDE, PRESERVATIVE FREE 10 ML: 5 INJECTION INTRAVENOUS at 21:43

## 2020-01-01 RX ADMIN — LEVETIRACETAM 1000 MG: 500 TABLET, FILM COATED ORAL at 08:55

## 2020-01-01 RX ADMIN — DEXTROSE MONOHYDRATE: 100 INJECTION, SOLUTION INTRAVENOUS at 16:03

## 2020-01-01 RX ADMIN — DEXTROSE MONOHYDRATE: 100 INJECTION, SOLUTION INTRAVENOUS at 11:44

## 2020-01-01 RX ADMIN — DEXTROSE AND SODIUM CHLORIDE 500 ML: 5; 900 INJECTION, SOLUTION INTRAVENOUS at 15:41

## 2020-01-01 ASSESSMENT — PAIN SCALES - GENERAL
PAINLEVEL_OUTOF10: 0
PAINLEVEL_OUTOF10: 6
PAINLEVEL_OUTOF10: 0
PAINLEVEL_OUTOF10: 3
PAINLEVEL_OUTOF10: 0
PAINLEVEL_OUTOF10: 0

## 2020-01-01 ASSESSMENT — ENCOUNTER SYMPTOMS
RHINORRHEA: 0
BACK PAIN: 0
CONSTIPATION: 0
BLOOD IN STOOL: 0
DIARRHEA: 0
CONSTIPATION: 1
VOMITING: 0
CHEST TIGHTNESS: 0
ABDOMINAL PAIN: 0
COUGH: 0
SHORTNESS OF BREATH: 0
WHEEZING: 0
COUGH: 0
DIARRHEA: 0
NAUSEA: 0
SHORTNESS OF BREATH: 0
NAUSEA: 0
SORE THROAT: 0
STRIDOR: 0

## 2020-01-01 ASSESSMENT — PAIN DESCRIPTION - PAIN TYPE
TYPE: CHRONIC PAIN
TYPE: ACUTE PAIN

## 2020-01-13 ENCOUNTER — CARE COORDINATION (OUTPATIENT)
Dept: CARE COORDINATION | Age: 80
End: 2020-01-13

## 2020-01-14 NOTE — CARE COORDINATION
Ambulatory Care Coordination Note  1/14/2020  CM Risk Score: 7  Charlson 10 Year Mortality Risk Score: 100%     ACC: Ann Marie Castillo RN    Summary Note: She has had back pain for a few days, not moving around as much. She will be getting an adjustable bed soon so won't have to sleep in her chair. Saw endocrinologist recently, she didn't think there were any changes in her medications. Called and requested progress note from Dr. Sofia Kiran office. Attempted to review medications but she doesn't know what they are. Her \"helpers\" organize her medications for her. They were not there to speak to to know what she has. Called [facility] and [employer], review medications she is getting. Unsure about methotrexate, was prescribed by Radha Burgess CNP in June but no refills. She hasn't received an inhaler in a couple of years, she rarely uses it. Encouraged to check the expiration date. Levetiracetam was stopped by PCP 11/15/19 but she received a mail order shipment 11/19/2019 for 90 day supply. Lower leg swelling the same. Left always a little worse than right. Just a little red right now. Was treated for cellulitis left lower leg for a couple of months in 2019. No worse weakness, no falls. Is using the Apidra 15 units before meals plus sliding scale. Stated sometimes she forgets to check her blood sugar or take it before meals, hard to take when she is not at home. Couldn't recall any blood sugar numbers because her helpers write them down for her. Very emotional and tearful on the phone, talked about loss of her  a lot. CC Plan:   -Follow in a few weeks, check endocrinology progress note, attempt to review medications with her aide. Diabetes Assessment    Medic Alert ID:  No  Meal Planning:  Avoidance of concentrated sweets   How often do you test your blood sugar?:  Meals, Bedtime   Do you have barriers with adherence to non-pharmacologic self-management interventions? (Nutrition/Exercise/Self-Monitoring):  Yes (Comment: unable to exercise, may need diet instruction)   Have you ever had to go to the ED for symptoms of low blood sugar?:  No       No patient-reported symptoms   Do you have hyperglycemia symptoms?:  No   Do you have hypoglycemia symptoms?:  No   Blood Sugar Monitoring Regimen:  Before Meals, At Bedtime   Blood Sugar Trends:  No Change      ,   Congestive Heart Failure Assessment    Are you currently restricting fluids?:  No Restriction  Do you understand a low sodium diet?:  Yes  Do you understand how to read food labels?:  No  How many restaurant meals do you eat per week?:  0  Do you salt your food before tasting it?:  No         Symptoms:   CHF associated dyspnea on exertion: Pos, CHF associated leg swelling: Pos      Symptom course:  stable  Weight trend:  stable (Comment: doesn't weigh self at home)  Salt intake watch compared to last visit:  stable      and   General Assessment    Do you have any symptoms that are causing concern?:  Yes  Progression since Onset:  Gradually Worsening  Reported Symptoms:  Pain (Comment: back ache)               Care Coordination Interventions    Program Enrollment:  Complex Care  Referral from Primary Care Provider:  No  Suggested Interventions and Community Resources  Medi Set or Pill Pack:  Declined  Pharmacist:  Declined  Senior Services:  Completed (Comment: eats lunch at [employer] M-F)  Other Services:  Completed (Comment: Private paid caretaker M-F)  Zone Management Tools: In Process (Comment: CHF)         Goals Addressed                 This Visit's Progress     Self Monitoring   On track     Self-Monitored Blood Glucose - I will check my blood sugar blood sugars pc  I will notify my provider of any trends of increasing or decreasing blood sugars over a 1 month period. I will notify my provider if I have any blood sugar readings less than 70 more than 2 times a month.   Daily Weights - I will weight myself as directed - every 72 hours and write down weights    None Recently Recorded    Barriers: impairment:  cognitive  Plan for overcoming my barriers: lack of education, overwhelmed   Confidence: 8/10  Anticipated Goal Completion Date: 3/30/20                   Prior to Admission medications    Medication Sig Start Date End Date Taking?  Authorizing Provider   acetaminophen (TYLENOL) 325 MG tablet Take 2 tablets by mouth every 6 hours as needed for Pain 7/22/19  Yes Irma Uribe MD   simvastatin (ZOCOR) 20 MG tablet TAKE 1 TABLET NIGHTLY 7/1/19  Yes Aakash Sanchez MD   APIDRA SOLOSTAR 100 UNIT/ML injection pen INJECT 15 UNITS SUBCUTANEOUSLY FOUR TIMES A DAY (BEFORE MEALS AND NIGHTLY) 3/28/19  Yes Aakash Sanchez MD   spironolactone (ALDACTONE) 25 MG tablet TAKE 1 TABLET DAILY 3/20/19  Yes Aakash Sanchez MD   B-D UF III MINI PEN NEEDLES 31G X 5 MM MISC INJECT SUBCUTANEOUSLY FOUR TIMES A DAY AS DIRECTED 2/19/18  Yes Aakash Sanchez MD   Glucose Blood (BLOOD GLUCOSE TEST STRIPS) STRP 1 strip by Does not apply route 4 times daily Indications: Mariam strips 8/21/17  Yes Aakash Sanchez MD   furosemide (LASIX) 40 MG tablet Take 40 mg by mouth daily Prescribed by Dr. Ana Trevizo Historical Provider, MD ZHAO FLEXTOUCH 100 UNIT/ML SOPN 40 Units nightly Patient is taking 60 units nightly 1/12/17  Yes Historical Provider, MD   clopidogrel (PLAVIX) 75 MG tablet Take 1 tablet by mouth daily 2/13/17  Yes Aakash Sanchez MD   metoprolol tartrate (LOPRESSOR) 25 MG tablet Take 0.5 tablets by mouth 2 times daily Indications: per Cardiologist's order 2/13/17  Yes Aakash Sanchez MD   Insulin Syringe-Needle U-100 (INSULIN SYRINGE .5CC/31GX5/16\") 31G X 5/16\" 0.5 ML MISC 1 each by Does not apply route daily 11/4/16  Yes Aakash Sanchez MD   glipiZIDE (GLUCOTROL) 10 MG tablet Take 1 tablet by mouth every morning (before breakfast) 8/4/16  Yes Aakash Sanchez MD   aspirin 81 MG

## 2020-01-24 ENCOUNTER — OFFICE VISIT (OUTPATIENT)
Dept: PRIMARY CARE CLINIC | Age: 80
End: 2020-01-24
Payer: MEDICARE

## 2020-01-24 VITALS
HEART RATE: 80 BPM | WEIGHT: 250.6 LBS | BODY MASS INDEX: 47.35 KG/M2 | SYSTOLIC BLOOD PRESSURE: 132 MMHG | DIASTOLIC BLOOD PRESSURE: 78 MMHG

## 2020-01-24 PROCEDURE — 99213 OFFICE O/P EST LOW 20 MIN: CPT | Performed by: FAMILY MEDICINE

## 2020-01-24 RX ORDER — BENZONATATE 100 MG/1
100 CAPSULE ORAL 3 TIMES DAILY PRN
Qty: 30 CAPSULE | Refills: 0 | Status: SHIPPED | OUTPATIENT
Start: 2020-01-24 | End: 2020-02-04 | Stop reason: SDUPTHER

## 2020-01-24 ASSESSMENT — PATIENT HEALTH QUESTIONNAIRE - PHQ9
SUM OF ALL RESPONSES TO PHQ QUESTIONS 1-9: 0
1. LITTLE INTEREST OR PLEASURE IN DOING THINGS: 0
SUM OF ALL RESPONSES TO PHQ9 QUESTIONS 1 & 2: 0
2. FEELING DOWN, DEPRESSED OR HOPELESS: 0
SUM OF ALL RESPONSES TO PHQ QUESTIONS 1-9: 0

## 2020-01-24 ASSESSMENT — ENCOUNTER SYMPTOMS
SHORTNESS OF BREATH: 1
SORE THROAT: 1
DIARRHEA: 0
NAUSEA: 0
COUGH: 1

## 2020-01-24 NOTE — PROGRESS NOTES
[facility] PRIMARY CARE  [address] [facility] [address]  Dept: [address] is a [de-identified] y.o. female who presents today for her medical conditions/complaintsas noted below. Chief Complaint   Patient presents with    Cough     Productive cough x 1 week. Phlegm yellow/white color    Shortness of Breath    Chest Congestion       HPI:     HPI- has been sick for over a week. Coughing a lot- hasn't slept for 2 days. Taking tylenol, but nothing else.       LDL Cholesterol (mg/dL)   Date Value   06/24/2016 72   12/01/2015 68     LDL Calculated (mg/dL)   Date Value   03/11/2019 88   07/06/2017 76       (goal LDL is <100)   AST (U/L)   Date Value   11/27/2019 21     ALT (U/L)   Date Value   11/27/2019 18     BUN (mg/dL)   Date Value   11/27/2019 36 (H)     BP Readings from Last 3 Encounters:   01/24/20 132/78   11/15/19 (!) 100/58   10/02/19 106/64          (goal 120/80)    Past Medical History:   Diagnosis Date    Acute upper respiratory infection     Backache     Bell's palsy     Cellulitis     Cough     Diabetes mellitus ([address]     H/O acute bronchitis with bronchospasm     H/O acute sinusitis     H/O allergic reaction     H/O allergic rhinitis     H/O tinea cruris     Hx of heart artery stent     heart stents x 4    Hyperlipidemia     Joint pain, hip     MI (myocardial infarction) ([address]     Morbid obesity ([address]     Otitis externa     Subarachnoid bleed ([address] march 28, 2016    Tendonitis, bicipital       Past Surgical History:   Procedure Laterality Date    CARDIAC CATHETERIZATION      heart stents x 4    CATARACT REM[facility]AL [facility] IMPLANT [facility]    CATARACT REMOVAL WITH IMPLANT Right 03/05/2019    [facility]    INTRACAPSULAR CATARACT EXTRACTION Right 3/5/2019    EYE CATARACT EMULSIFICATION IOL IMPLANT performed by Lanetta Dubin, MD at [facility] Left     AGUSTIN Desir triamcinolone (KENALOG) 0.025 % ointment Apply 1 each topically 2 times daily as needed  1    Glucose Blood (BLOOD GLUCOSE TEST STRIPS) STRP 1 strip by Does not apply route 4 times daily Indications: Mariam strips 200 strip 3    furosemide (LASIX) 40 MG tablet Take 40 mg by mouth daily Prescribed by Dr. Kaylyn Everett (APPLE CIDER VINEGAR) TABS Take by mouth      vitamin D (ERGOCALCIFEROL) 83073 UNITS CAPS capsule TAKE 1 CAPSULE BY MOUTH ONE TIME A WEEK  4 capsule 2    TRESIBA FLEXTOUCH 100 UNIT/ML SOPN 40 Units nightly Patient is taking 60 units nightly      clopidogrel (PLAVIX) 75 MG tablet Take 1 tablet by mouth daily 90 tablet 3    metoprolol tartrate (LOPRESSOR) 25 MG tablet Take 0.5 tablets by mouth 2 times daily Indications: per Cardiologist's order 90 tablet 3    loperamide (IMODIUM) 2 MG capsule Take 1 capsule by mouth as needed for Diarrhea 30 capsule 1    Insulin Syringe-Needle U-100 (INSULIN SYRINGE .5CC/31GX5/16\") 31G X 5/16\" 0.5 ML MISC 1 each by Does not apply route daily 100 each 3    glipiZIDE (GLUCOTROL) 10 MG tablet Take 1 tablet by mouth every morning (before breakfast) 90 tablet 3    aspirin 81 MG chewable tablet Take 1 tablet by mouth daily 30 tablet 3     No current facility-administered medications for this visit.       Allergies   Allergen Reactions    Lantus [Insulin Glargine]      Hives,itching    Metformin And Related Diarrhea    Prednisone Swelling     Facial swelling    Sertraline Other (See Comments)     hallucination    Eggs Or Egg-Derived Products Rash    Novolog [Insulin Aspart] Itching and Rash    Zinc Itching and Rash       Health Maintenance   Topic Date Due    Shingles Vaccine (1 of 2) 03/02/1989    DEXA (modify frequency per FRAX score)  03/02/2004    DTaP/Tdap/Td vaccine (2 - Td) 09/14/2010    Annual Wellness Visit (AWV)  05/29/2019    Flu vaccine (1) 11/15/2020 (Originally 9/1/2019)    Lipid screen  03/11/2020    Potassium monitoring  11/27/2020    Creatinine monitoring  11/27/2020    Pneumococcal 65+ years Vaccine  Completed       Subjective:      Review of Systems   Constitutional: Positive for chills. Negative for fever. HENT: Positive for congestion and sore throat (raw). Respiratory: Positive for cough and shortness of breath. Cardiovascular: Negative for chest pain and palpitations. Gastrointestinal: Negative for diarrhea and nausea. Neurological: Positive for headaches. Negative for dizziness and light-headedness. Objective:     /78   Pulse 80   Wt 250 lb 9.6 oz (113.7 kg)   BMI 47.35 kg/m²   Physical Exam  Vitals signs and nursing note reviewed. Constitutional:       General: She is not in acute distress. Appearance: She is well-developed. HENT:      Head: Normocephalic and atraumatic. Eyes:      General: No scleral icterus. Right eye: No discharge. Left eye: No discharge. Conjunctiva/sclera: Conjunctivae normal.      Pupils: Pupils are equal, round, and reactive to light. Neck:      Thyroid: No thyromegaly. Trachea: No tracheal deviation. Cardiovascular:      Rate and Rhythm: Normal rate and regular rhythm. Heart sounds: Normal heart sounds. Comments: No carotid bruits  Pulmonary:      Effort: Pulmonary effort is normal. No respiratory distress. Breath sounds: Normal breath sounds. No wheezing. Lymphadenopathy:      Cervical: No cervical adenopathy. Skin:     General: Skin is warm. Findings: No rash. Neurological:      Mental Status: She is alert and oriented to person, place, and time. Psychiatric:         Behavior: Behavior normal.         Thought Content: Thought content normal.         Assessment:       Diagnosis Orders   1. Uncontrolled type 2 diabetes mellitus with hyperglycemia ([facility]  Jem Diaz MD, Endocrinology, [place]   2.  Sinusitis, unspecified chronicity, unspecified location          Plan:      Return in about 3 months (around 4/24/2020), or if symptoms worsen or fail to improve, for HTN f/u. Orders Placed This Encounter   Procedures   Ghassan Quiñonez MD, Endocrinology, [place]     Referral Priority:   Routine     Referral Type:   Eval and Treat     Referral Reason:   Specialty Services Required     Referred to Provider:   Romina Rodney     Requested Specialty:   Endocrinology     Number of Visits Requested:   1     Orders Placed This Encounter   Medications    benzonatate (TESSALON PERLES) 100 MG capsule     Sig: Take 1 capsule by mouth 3 times daily as needed for Cough     Dispense:  30 capsule     Refill:  0       Patient given educationalmaterials - see patient instructions. Discussed use, benefit, and side effectsof prescribed medications. All patient questions answered. Pt voiced understanding. Reviewed health maintenance. Instructed to continue current medications, diet andexercise. Patient agreed with treatment plan. Follow up as directed.      Electronicallysigned by Eli Redding MD on 1/24/2020 at 10:47 AM

## 2020-01-27 LAB
AVERAGE GLUCOSE: 126
HBA1C MFR BLD: 7.4 %

## 2020-01-28 ENCOUNTER — TELEPHONE (OUTPATIENT)
Dept: PRIMARY CARE CLINIC | Age: 80
End: 2020-01-28

## 2020-02-03 ENCOUNTER — TELEPHONE (OUTPATIENT)
Dept: PRIMARY CARE CLINIC | Age: 80
End: 2020-02-03

## 2020-02-04 ENCOUNTER — CARE COORDINATION (OUTPATIENT)
Dept: CARE COORDINATION | Age: 80
End: 2020-02-04

## 2020-02-04 RX ORDER — AMOXICILLIN 500 MG/1
1 CAPSULE ORAL 3 TIMES DAILY
COMMUNITY
Start: 2020-01-27 | End: 2020-02-06

## 2020-02-06 RX ORDER — BENZONATATE 100 MG/1
100 CAPSULE ORAL 3 TIMES DAILY PRN
Qty: 30 CAPSULE | Refills: 0 | Status: SHIPPED | OUTPATIENT
Start: 2020-02-06 | End: 2020-02-16

## 2020-02-07 ENCOUNTER — CARE COORDINATION (OUTPATIENT)
Dept: CARE COORDINATION | Age: 80
End: 2020-02-07

## 2020-02-07 NOTE — CARE COORDINATION
Ambulatory Care Coordination Note  2/7/2020  CM Risk Score: 7  Charlson 10 Year Mortality Risk Score: 100%     ACC: Hai Ward, RN    Summary Note: Still with cough but diarrhea and abdominal pain much improved since stopped antibiotic. She has a raspy frequent cough, can't talk very long and starts coughing. Cough is moist, sounds congested. Exhausted from not sleeping. She hasn't taken any more Tessalon, was making her too sleepy and felt out of it, wasn't really helping the cough. Hasn't tried anything else for the cough, afraid something would affect her heart or her one kidney. Not eating much this week, doesn't know if she's lost weight. I encouraged her to weigh herself at least every other day due to CHF since may have worse cough from that also. Per PCP, she should try Coricidin HBP. I called and notified her Alba Lambert, she will give information to patient and her family so can get at store. CC Plan:   -Follow in a week to check symptoms, blood sugars, weights, continue CHF education. Diabetes Assessment    Medic Alert ID:  No  Meal Planning:  Avoidance of concentrated sweets   How often do you test your blood sugar?:  Meals, Bedtime   Do you have barriers with adherence to non-pharmacologic self-management interventions?  (Nutrition/Exercise/Self-Monitoring):  Yes (Comment: unable to exercise, may need diet instruction)   Have you ever had to go to the ED for symptoms of low blood sugar?:  No       No patient-reported symptoms      ,   Congestive Heart Failure Assessment    Are you currently restricting fluids?:  No Restriction  Do you understand a low sodium diet?:  Yes  Do you understand how to read food labels?:  No  How many restaurant meals do you eat per week?:  0  Do you salt your food before tasting it?:  No         Symptoms:   CHF associated dyspnea on exertion: Pos      Symptom course:  stable  Weight trend:   (Comment: hasn't weighed herself lately)  Salt intake watch compared to last visit:  stable      and   General Assessment    Do you have any symptoms that are causing concern?:  Yes  Progression since Onset:  Unchanged  Reported Symptoms:  Cough, Congestion               Care Coordination Interventions    Program Enrollment:  Complex Care  Referral from Primary Care Provider:  No  Suggested Interventions and Community Resources  Medi Set or Pill Pack:  Declined  Pharmacist:  Declined  Senior Services:  Completed (Comment: eats lunch at [employer] M-F)  Other Services:  Completed (Comment: Private paid caretaker M-F)  Zone Management Tools: In Process (Comment: CHF)         Goals Addressed                 This Visit's Progress     Self Monitoring   Improving     Self-Monitored Blood Glucose - I will check my blood sugar blood sugars pc  I will notify my provider of any trends of increasing or decreasing blood sugars over a 1 month period. I will notify my provider if I have any blood sugar readings less than 70 more than 2 times a month. Daily Weights - I will weight myself as directed - every 72 hours and write down weights    None Recently Recorded    Barriers: impairment:  cognitive  Plan for overcoming my barriers: lack of education, overwhelmed   Confidence: 8/10  Anticipated Goal Completion Date: 3/30/20                   Prior to Admission medications    Medication Sig Start Date End Date Taking? Authorizing Provider   benzonatate (TESSALON PERLES) 100 MG capsule Take 1 capsule by mouth 3 times daily as needed for Cough 2/6/20 2/16/20  Martin Collins MD   insulin glulisine (APIDRA SOLOSTAR) 100 UNIT/ML injection pen Inject into the skin 3 times daily (before meals) 25 units before breakfast, 30 units before lunch, 30 units before supper per Dr. Chika Poole MD   Clobetasol Propionate 0.05 % LIQD Once daily for not more than 2 weeks at a time.  10/2/19   Shelley Virk PA-C   acetaminophen (TYLENOL) 325 MG tablet Take 2 tablets by mouth every 6 hours as needed for Pain 7/22/19   Prabha Castillo MD   simvastatin (ZOCOR) 20 MG tablet TAKE 1 TABLET NIGHTLY 7/1/19   Joss Dennis MD   spironolactone (ALDACTONE) 25 MG tablet TAKE 1 TABLET DAILY 3/20/19   Joss Dennis MD   Ginger, Zingiber officinalis, (GINGER PO) Take by mouth    Historical Provider, MD   Multiple Vitamins-Minerals (MULTIVITAMIN WOMEN PO) Take by mouth    Historical Provider, MD   albuterol sulfate HFA (PROVENTIL HFA) 108 (90 Base) MCG/ACT inhaler Inhale 2 puffs into the lungs every 6 hours as needed for Shortness of Breath (cough) 3/23/18   Joss Dennis MD   B-D UF III MINI PEN NEEDLES 31G X 5 MM MISC INJECT SUBCUTANEOUSLY FOUR TIMES A DAY AS DIRECTED 2/19/18   Joss Dennis MD   calcium carbonate (OYSTER SHELL CALCIUM 500 MG) 1250 (500 Ca) MG tablet TAKE 1 TABLET BY MOUTH ONE TIME A DAY  1/22/18   Joss Dennis MD   methotrexate (RHEUMATREX) 2.5 MG chemo tablet Take 2 tablets by mouth once a week 12/15/17   Historical Provider, MD   triamcinolone (KENALOG) 0.025 % ointment Apply 1 each topically 2 times daily as needed 12/7/17   Historical Provider, MD   Glucose Blood (BLOOD GLUCOSE TEST STRIPS) STRP 1 strip by Does not apply route 4 times daily Indications: Mariam strips 8/21/17   Joss Dennis MD   furosemide (LASIX) 40 MG tablet Take 40 mg by mouth daily Prescribed by Dr. Glendora Merlin Provider, MD   [facility] Natural Products (APPLE CIDER VINEGAR) TABS Take by mouth    Historical Provider, MD   vitamin D (ERGOCALCIFEROL) 54838 UNITS CAPS capsule TAKE 1 CAPSULE BY MOUTH ONE TIME A WEEK  3/7/17   Joss Dennis MD   TRESIBA FLEXTOUCH 100 UNIT/ML SOPN 60 Units nightly Patient is taking 60 units nightly 1/12/17   Historical Provider, MD   clopidogrel (PLAVIX) 75 MG tablet Take 1 tablet by mouth daily 2/13/17   Joss Dennis MD   metoprolol tartrate (LOPRESSOR) 25 MG tablet Take 0.5 tablets by mouth 2 times daily Indications: per Cardiologist's order 2/13/17   Jaswant Harris MD   loperamide (IMODIUM) 2 MG capsule Take 1 capsule by mouth as needed for Diarrhea 1/9/17   Jaswant Harris MD   Insulin Syringe-Needle U-100 (INSULIN SYRINGE .5CC/31GX5/16\") 31G X 5/16\" 0.5 ML MISC 1 each by Does not apply route daily 11/4/16   Jaswant Harris MD   glipiZIDE (GLUCOTROL) 10 MG tablet Take 1 tablet by mouth every morning (before breakfast) 8/4/16   Jaswant Harris MD   aspirin 81 MG chewable tablet Take 1 tablet by mouth daily 6/26/16   Sunil Min MD       Future Appointments   Date Time Provider Jenni Delgadoi   2/20/2020  1:00 PM Jaswant Harris MD [facility]

## 2020-02-20 ENCOUNTER — OFFICE VISIT (OUTPATIENT)
Dept: PRIMARY CARE CLINIC | Age: 80
End: 2020-02-20
Payer: MEDICARE

## 2020-02-20 ENCOUNTER — CARE COORDINATION (OUTPATIENT)
Dept: CARE COORDINATION | Age: 80
End: 2020-02-20

## 2020-02-20 VITALS
OXYGEN SATURATION: 93 % | SYSTOLIC BLOOD PRESSURE: 110 MMHG | HEART RATE: 80 BPM | BODY MASS INDEX: 48.3 KG/M2 | WEIGHT: 255.6 LBS | DIASTOLIC BLOOD PRESSURE: 60 MMHG

## 2020-02-20 PROCEDURE — 99213 OFFICE O/P EST LOW 20 MIN: CPT | Performed by: FAMILY MEDICINE

## 2020-02-20 RX ORDER — INSULIN DEGLUDEC INJECTION 100 U/ML
70 INJECTION, SOLUTION SUBCUTANEOUS NIGHTLY
Qty: 5 PEN | Refills: 0
Start: 2020-02-20 | End: 2021-01-01 | Stop reason: SDUPTHER

## 2020-02-20 NOTE — CARE COORDINATION
Ambulatory Care Coordination Note  2/20/2020  CM Risk Score: 7  Charlson 10 Year Mortality Risk Score: 100%     ACC: Levon Barkley, TITUS    Summary Note: Saw after appt. Cough and diarrhea much improved, blood sugars down. CHF symptoms are stable. Taking diuretic, has frequent urination. Very lamentive about not being able to drive, can't go places she used to, feels stuck at home. Her caretaker is with her today, drives her to store, to appts, gets hair and nails done regularly. Will use [facility] ear gtts the next 4 days then come back in for nurse visit to have ears irrigated. Ears have felt plugged for a few weeks, aide was using an OTC ear wax removal kit at home. CC Plan:   -Follow up next week to check on ear symptoms, review medications, continue CHF education with zone guide. Diabetes Assessment    Medic Alert ID:  No  Meal Planning:  Avoidance of concentrated sweets   How often do you test your blood sugar?:  Meals, Bedtime   Do you have barriers with adherence to non-pharmacologic self-management interventions?  (Nutrition/Exercise/Self-Monitoring):  Yes (Comment: unable to exercise, may need diet instruction)   Have you ever had to go to the ED for symptoms of low blood sugar?:  No       Do you have hyperglycemia symptoms?:  No   Do you have hypoglycemia symptoms?:  No   Blood Sugar Monitoring Regimen:  At Bedtime, Before Meals   Blood Sugar Trends:  Steady Decrease      ,   Congestive Heart Failure Assessment    Are you currently restricting fluids?:  No Restriction  Do you understand a low sodium diet?:  Yes  Do you understand how to read food labels?:  No  How many restaurant meals do you eat per week?:  0  Do you salt your food before tasting it?:  No         Symptoms:   CHF associated dyspnea on exertion: Pos      Symptom course:  stable  Weight trend:  stable  Salt intake watch compared to last visit:  stable      and   General Assessment    Do you have any symptoms that are causing concern?: Yes  Progression since Onset:  Gradually Worsening  Reported Symptoms:  Other (Comment: ears plugged, can't hear)                 Care Coordination Interventions    Program Enrollment:  Complex Care  Referral from Primary Care Provider:  No  Suggested Interventions and Community Resources  Medi Set or Pill Pack:  Declined  Pharmacist:  Declined  Senior Services:  Completed (Comment: eats lunch at [employer] M-F)  Other Services:  Completed (Comment: Private paid caretaker M-F)  Zone Management Tools: In Process (Comment: CHF)         Goals Addressed    None         Prior to Admission medications    Medication Sig Start Date End Date Taking? Authorizing Provider   TRESIBA FLEXTOUCH 100 UNIT/ML SOPN Inject 70 Units into the skin nightly Patient is taking 60 units nightly 2/20/20   Juanito Torres MD   levETIRAcetam (KEPPRA) 1000 MG tablet TAKE 1 TABLET TWICE A DAY 2/13/20   Juanito Torres MD   insulin glulisine (APIDRA SOLOSTAR) 100 UNIT/ML injection pen Inject into the skin 3 times daily (before meals) 25 units before breakfast, 30 units before lunch, 30 units before supper per Dr. Malena Macias Provider, MD   Clobetasol Propionate 0.05 % LIQD Once daily for not more than 2 weeks at a time.  10/2/19   Shyanne Meléndez PA-C   acetaminophen (TYLENOL) 325 MG tablet Take 2 tablets by mouth every 6 hours as needed for Pain 7/22/19   Darcy Holt MD   simvastatin (ZOCOR) 20 MG tablet TAKE 1 TABLET NIGHTLY 7/1/19   Juanito Torres MD   spironolactone (ALDACTONE) 25 MG tablet TAKE 1 TABLET DAILY 3/20/19   Juanito Torres MD   Ginger, Zingiber officinalis, (GINGER PO) Take by mouth    Historical Provider, MD   Multiple Vitamins-Minerals (MULTIVITAMIN WOMEN PO) Take by mouth    Historical Provider, MD   albuterol sulfate HFA (PROVENTIL HFA) 108 (90 Base) MCG/ACT inhaler Inhale 2 puffs into the lungs every 6 hours as needed for Shortness of Breath (cough)  Patient not taking: Reported on

## 2020-02-20 NOTE — PROGRESS NOTES
03/05/2019    Efrem/Linda    INTRACAPSULAR CATARACT EXTRACTION Right 3/5/2019    EYE CATARACT EMULSIFICATION IOL IMPLANT performed by Salbador Cardona MD at [address] Left     NH XCAPSL CTRC RMVL INSJ IO LENS PROSTH W/O ECP Left 11/1/2018    EYE CATARACT EMULSIFICATION IOL IMPLANT performed by Salbador Cardona MD at [address] [place] [address]  12/9/15       Family History   Problem Relation Age of Onset    Diabetes Father     Hypertension Father     Cancer Father         colon cancer    Diabetes Mother     Heart Disease Mother     Diabetes Brother     Heart Disease Brother        Social History     Tobacco Use    Smoking status: Never Smoker    Smokeless tobacco: Never Used   Substance Use Topics    Alcohol use: No     Alcohol/week: 0.0 standard drinks     Comment: one glass of wine per year      Current Outpatient Medications   Medication Sig Dispense Refill    TRESIBA FLEXTOUCH 100 UNIT/ML SOPN Inject 70 Units into the skin nightly Patient is taking 60 units nightly 5 pen 0    levETIRAcetam (KEPPRA) 1000 MG tablet TAKE 1 TABLET TWICE A  tablet 3    insulin glulisine (APIDRA SOLOSTAR) 100 UNIT/ML injection pen Inject into the skin 3 times daily (before meals) 25 units before breakfast, 30 units before lunch, 30 units before supper per Dr. Lyle Fontenot Clobetasol Propionate 0.05 % LIQD Once daily for not more than 2 weeks at a time.  1 Bottle 0    acetaminophen (TYLENOL) 325 MG tablet Take 2 tablets by mouth every 6 hours as needed for Pain 40 tablet 0    simvastatin (ZOCOR) 20 MG tablet TAKE 1 TABLET NIGHTLY 90 tablet 3    spironolactone (ALDACTONE) 25 MG tablet TAKE 1 TABLET DAILY 90 tablet 3    Ginger, Zingiber officinalis, (GINGER PO) Take by mouth      Multiple Vitamins-Minerals (MULTIVITAMIN WOMEN PO) Take by mouth      B-D UF III MINI PEN NEEDLES 31G X 5 MM MISC INJECT SUBCUTANEOUSLY FOUR TIMES A DAY AS DIRECTED 100 each 4    calcium carbonate (OYSTER SHELL CALCIUM 500 MG) 1250 (500 Ca) MG tablet TAKE 1 TABLET BY MOUTH ONE TIME A DAY  30 tablet 1    triamcinolone (KENALOG) 0.025 % ointment Apply 1 each topically 2 times daily as needed  1    Glucose Blood (BLOOD GLUCOSE TEST STRIPS) STRP 1 strip by Does not apply route 4 times daily Indications: Mariam strips 200 strip 3    furosemide (LASIX) 40 MG tablet Take 40 mg by mouth daily Prescribed by Dr. Reji Allen vitamin D (ERGOCALCIFEROL) 98025 UNITS CAPS capsule TAKE 1 CAPSULE BY MOUTH ONE TIME A WEEK  4 capsule 2    clopidogrel (PLAVIX) 75 MG tablet Take 1 tablet by mouth daily 90 tablet 3    metoprolol tartrate (LOPRESSOR) 25 MG tablet Take 0.5 tablets by mouth 2 times daily Indications: per Cardiologist's order 90 tablet 3    loperamide (IMODIUM) 2 MG capsule Take 1 capsule by mouth as needed for Diarrhea 30 capsule 1    Insulin Syringe-Needle U-100 (INSULIN SYRINGE .5CC/31GX5/16\") 31G X 5/16\" 0.5 ML MISC 1 each by Does not apply route daily 100 each 3    glipiZIDE (GLUCOTROL) 10 MG tablet Take 1 tablet by mouth every morning (before breakfast) 90 tablet 3    aspirin 81 MG chewable tablet Take 1 tablet by mouth daily 30 tablet 3    albuterol sulfate HFA (PROVENTIL HFA) 108 (90 Base) MCG/ACT inhaler Inhale 2 puffs into the lungs every 6 hours as needed for Shortness of Breath (cough) (Patient not taking: Reported on 2/20/2020) 1 Inhaler 3    Misc Natural Products (APPLE CIDER VINEGAR) TABS Take by mouth       No current facility-administered medications for this visit.       Allergies   Allergen Reactions    Lantus [Insulin Glargine]      Hives,itching    Metformin And Related Diarrhea    Prednisone Swelling     Facial swelling    Sertraline Other (See Comments)     hallucination    Amoxicillin Diarrhea and Nausea And Vomiting    Eggs Or Egg-Derived Products Rash    Novolog [Insulin Aspart] Itching and Rash    Zinc Itching and Rash       Health Maintenance   Topic Date Due  Hepatitis B vaccine (1 of 3 - Risk 3-dose series) 03/02/1959    Shingles Vaccine (1 of 2) 03/02/1989    DEXA (modify frequency per FRAX score)  03/02/2004    DTaP/Tdap/Td vaccine (2 - Td) 09/14/2010    Annual Wellness Visit (AWV)  05/29/2019    Flu vaccine (1) 11/15/2020 (Originally 9/1/2019)    Lipid screen  03/11/2020    Potassium monitoring  11/27/2020    Creatinine monitoring  11/27/2020    Pneumococcal 65+ years Vaccine  Completed    Hepatitis A vaccine  Aged Out    Hib vaccine  Aged Out    Meningococcal (ACWY) vaccine  Aged Out       Subjective:      Review of Systems    Objective:     /60   Pulse 80   Wt 255 lb 9.6 oz (115.9 kg)   SpO2 93%   BMI 48.30 kg/m²   Physical Exam  Vitals signs and nursing note reviewed. Constitutional:       General: She is not in acute distress. Appearance: She is well-developed. HENT:      Head: Normocephalic and atraumatic. Ears:      Comments: Wax bilaterally  Eyes:      General: No scleral icterus. Right eye: No discharge. Left eye: No discharge. Conjunctiva/sclera: Conjunctivae normal.      Pupils: Pupils are equal, round, and reactive to light. Neck:      Thyroid: No thyromegaly. Trachea: No tracheal deviation. Cardiovascular:      Rate and Rhythm: Normal rate and regular rhythm. Heart sounds: Normal heart sounds. Comments: No carotid bruits  Pulmonary:      Effort: Pulmonary effort is normal. No respiratory distress. Breath sounds: Normal breath sounds. No wheezing. Lymphadenopathy:      Cervical: No cervical adenopathy. Skin:     General: Skin is warm. Findings: No rash. Neurological:      Mental Status: She is alert and oriented to person, place, and time. Psychiatric:         Behavior: Behavior normal.         Thought Content: Thought content normal.         Assessment:       Diagnosis Orders   1. SAH (subarachnoid hemorrhage) ([facility] [address]     2.  Chronic diastolic CHF (congestive

## 2020-02-25 ENCOUNTER — NURSE ONLY (OUTPATIENT)
Dept: PRIMARY CARE CLINIC | Age: 80
End: 2020-02-25
Payer: MEDICARE

## 2020-02-25 PROCEDURE — 69210 REMOVE IMPACTED EAR WAX UNI: CPT | Performed by: FAMILY MEDICINE

## 2020-02-28 ENCOUNTER — CARE COORDINATION (OUTPATIENT)
Dept: CARE COORDINATION | Age: 80
End: 2020-02-28

## 2020-02-28 NOTE — CARE COORDINATION
Ambulatory Care Coordination Note  2/28/2020  CM Risk Score: 7  Charlson 10 Year Mortality Risk Score: 100%     ACC: Nova Ridley RN    Summary Note: Her ears feel much better, her voice is not as hoarse, generally much improved. No cough or shortness of breath right now. Skin is very dry due to heat, has cracks in her fingers. Encouraged to apply lotion a few times a day, watch for signs of infection of cracked skin (redness, swelling, warmth, any purulent drainage). Blood sugars staying good range, less than 160 and no low readings. No recent diarrhea or n/v.   CC Plan:   -Follow in a few weeks to complete CHF education review, assess for graduation from care management. Diabetes Assessment    Medic Alert ID:  No  Meal Planning:  Avoidance of concentrated sweets   How often do you test your blood sugar?:  Meals, Bedtime   Do you have barriers with adherence to non-pharmacologic self-management interventions?  (Nutrition/Exercise/Self-Monitoring):  Yes (Comment: unable to exercise, may need diet instruction)   Have you ever had to go to the ED for symptoms of low blood sugar?:  No       No patient-reported symptoms   Do you have hyperglycemia symptoms?:  No   Do you have hypoglycemia symptoms?:  No   Blood Sugar Monitoring Regimen:  At Bedtime, Before Meals   Blood Sugar Trends:  No Change       and   Congestive Heart Failure Assessment    Are you currently restricting fluids?:  No Restriction  Do you understand a low sodium diet?:  Yes  Do you understand how to read food labels?:  No  How many restaurant meals do you eat per week?:  0  Do you salt your food before tasting it?:  No         Symptoms:   CHF associated dyspnea on exertion: Pos      Symptom course:  stable  Weight trend:  stable  Salt intake watch compared to last visit:  stable               Care Coordination Interventions    Program Enrollment:  Complex Care  Referral from Primary Care Provider:  No  Suggested Interventions and Community Resources  Medi Set or Pill Pack:  Declined  Pharmacist:  Declined  Senior Services:  Completed (Comment: eats lunch at [employer] M-F)  Other Services:  Completed (Comment: Private paid caretaker M-F)  Zone Management Tools: In Process (Comment: CHF)         Goals Addressed                 This Visit's Progress     Self Monitoring   On track     Self-Monitored Blood Glucose - I will check my blood sugar blood sugars pc  I will notify my provider of any trends of increasing or decreasing blood sugars over a 1 month period. I will notify my provider if I have any blood sugar readings less than 70 more than 2 times a month. Daily Weights - I will weight myself as directed - every 72 hours and write down weights    None Recently Recorded    Barriers: impairment:  cognitive  Plan for overcoming my barriers: lack of education, overwhelmed   Confidence: 8/10  Anticipated Goal Completion Date: 3/30/20                   Prior to Admission medications    Medication Sig Start Date End Date Taking? Authorizing Provider   TRESIBA FLEXTOUCH 100 UNIT/ML SOPN Inject 70 Units into the skin nightly Patient is taking 60 units nightly 2/20/20   Cynthia Antonio MD   levETIRAcetam (KEPPRA) 1000 MG tablet TAKE 1 TABLET TWICE A DAY 2/13/20   Cynthia Antonio MD   insulin glulisine (APIDRA SOLOSTAR) 100 UNIT/ML injection pen Inject into the skin 3 times daily (before meals) 25 units before breakfast, 30 units before lunch, 30 units before supper per Dr. Caitlin Poole MD   Clobetasol Propionate 0.05 % LIQD Once daily for not more than 2 weeks at a time.  10/2/19   Earnest Tilley PA-C   acetaminophen (TYLENOL) 325 MG tablet Take 2 tablets by mouth every 6 hours as needed for Pain 7/22/19   Alex Bartholomew MD   simvastatin (ZOCOR) 20 MG tablet TAKE 1 TABLET NIGHTLY 7/1/19   Cynthia Antonio MD   spironolactone (ALDACTONE) 25 MG tablet TAKE 1 TABLET DAILY 3/20/19   MD Nirmala Maldonado Zingiber officinalis, (GINGER PO) Take by mouth    Historical Provider, MD   Multiple Vitamins-Minerals (MULTIVITAMIN WOMEN PO) Take by mouth    Historical Provider, MD   albuterol sulfate HFA (PROVENTIL HFA) 108 (90 Base) MCG/ACT inhaler Inhale 2 puffs into the lungs every 6 hours as needed for Shortness of Breath (cough)  Patient not taking: Reported on 2/20/2020 3/23/18   MD ROCAEL Patel-D UF III MINI PEN NEEDLES 31G X 5 MM MISC INJECT SUBCUTANEOUSLY FOUR TIMES A DAY AS DIRECTED 2/19/18   Keyshawn Velzáquez MD   calcium carbonate (OYSTER SHELL CALCIUM 500 MG) 1250 (500 Ca) MG tablet TAKE 1 TABLET BY MOUTH ONE TIME A DAY  1/22/18   Keyshawn Velázquez MD   triamcinolone (KENALOG) 0.025 % ointment Apply 1 each topically 2 times daily as needed 12/7/17   Historical Provider, MD   Glucose Blood (BLOOD GLUCOSE TEST STRIPS) STRP 1 strip by Does not apply route 4 times daily Indications: Mariam strips 8/21/17   Keyshawn Velázquez MD   furosemide (LASIX) 40 MG tablet Take 40 mg by mouth daily Prescribed by Dr. Corry Garcia ProviderMD [employer] (APPLE CIDER VINEGAR) TABS Take by mouth    Historical Provider, MD   vitamin D (ERGOCALCIFEROL) 22337 UNITS CAPS capsule TAKE 1 CAPSULE BY MOUTH ONE TIME A WEEK  3/7/17   Keyshawn Velázquez MD   clopidogrel (PLAVIX) 75 MG tablet Take 1 tablet by mouth daily 2/13/17   Keyshawn Velázquez MD   metoprolol tartrate (LOPRESSOR) 25 MG tablet Take 0.5 tablets by mouth 2 times daily Indications: per Cardiologist's order 2/13/17   Keyshawn Velázquez MD   loperamide (IMODIUM) 2 MG capsule Take 1 capsule by mouth as needed for Diarrhea 1/9/17   Keyshawn Velázquez MD   Insulin Syringe-Needle U-100 (INSULIN SYRINGE .5CC/31GX5/16\") 31G X 5/16\" 0.5 ML MISC 1 each by Does not apply route daily 11/4/16   Keyshawn Velázquez MD   glipiZIDE (GLUCOTROL) 10 MG tablet Take 1 tablet by mouth every morning (before breakfast) 8/4/16

## 2020-03-18 ENCOUNTER — CARE COORDINATION (OUTPATIENT)
Dept: CARE COORDINATION | Age: 80
End: 2020-03-18

## 2020-03-18 NOTE — CARE COORDINATION
Ambulatory Care Coordination Note  3/18/2020  CM Risk Score: 7  Charlson 10 Year Mortality Risk Score: 100%     ACC: Mena Anguiano, RN    Summary Note: Occasional cough with white sputum, always able to expectorate it. Feeling much better than a few weeks ago. Not weighing herself at home very often, hard to stand on scale. Left leg swelling, goes down at HS and usually keeps elevated during the day. No worse shortness of breath. Blood sugars fluctuate, 226 this morning and 198 before lunch. Have been running a little higher lately. She drinks an energy-type drink at HS so doesn't get low blood sugar in the morning. Has 15 Gm carbs in the entire bottle so not high carb. Encouraged to always read the food labels. Last A1c 7.4, she sees endo crinology in April. Main complaint is loneliness, melancholy. She has her aide 9-5 M-F and talks to friends on the phone daily. Feels cooped up since can't go to senior center or [facility] (closed for Coronavirus precautions). Attempted to discuss reasons for closing facilities, she thinks it's a conspiracy because of an election year. CC Plan:   -Follow up in one month to check on symptoms, review diet and blood sugars, assess for graduation for care management. Diabetes Assessment    Medic Alert ID:  Yes  Meal Planning:  Avoidance of concentrated sweets   How often do you test your blood sugar?:  Meals, Bedtime   Do you have barriers with adherence to non-pharmacologic self-management interventions?  (Nutrition/Exercise/Self-Monitoring):  Yes (Comment: unable to exercise, may need diet instruction)   Have you ever had to go to the ED for symptoms of low blood sugar?:  No       Do you have hyperglycemia symptoms?:  No   Do you have hypoglycemia symptoms?:  No   Last Blood Sugar Value:  191   Blood Sugar Monitoring Regimen:  Before Meals, At Bedtime   Blood Sugar Trends:  Fluctuating      ,   Congestive Heart Failure Assessment    Are you currently restricting fluids?:  No Restriction  Do you understand a low sodium diet?:  Yes  Do you understand how to read food labels?:  No  How many restaurant meals do you eat per week?:  0  Do you salt your food before tasting it?:  No         Symptoms:   CHF associated dyspnea on exertion: Pos, CHF associated leg swelling: Pos      Symptom course:  stable  Weight trend:   (Comment: not weighing herself at home)  Salt intake watch compared to last visit:  stable      and   General Assessment    Do you have any symptoms that are causing concern?:  Yes  Reported Symptoms:  Other (Comment: melancholy)                 Care Coordination Interventions    Program Enrollment:  Complex Care  Referral from Primary Care Provider:  No  Suggested Interventions and Community Resources  Medi Set or Pill Pack:  Declined  Pharmacist:  Declined  Senior Services:  Completed (Comment: eats lunch at [employer] M-F)  Other Services:  Completed (Comment: Private paid caretaker M-F)  Zone Management Tools: In Process (Comment: CHF)         Goals Addressed                 This Visit's Progress     Self Monitoring   On track     Self-Monitored Blood Glucose - I will check my blood sugar blood sugars pc  I will notify my provider of any trends of increasing or decreasing blood sugars over a 1 month period. I will notify my provider if I have any blood sugar readings less than 70 more than 2 times a month. Daily Weights - I will weight myself as directed - every 72 hours and write down weights    None Recently Recorded    Barriers: impairment:  cognitive  Plan for overcoming my barriers: lack of education, overwhelmed   Confidence: 8/10  Anticipated Goal Completion Date: 3/30/20                   Prior to Admission medications    Medication Sig Start Date End Date Taking?  Authorizing Provider   TRESIBA FLEXTOUCH 100 UNIT/ML SOPN Inject 70 Units into the skin nightly Patient is taking 60 units nightly 2/20/20  Yes Keyshawn Velázquez MD   levETIRAcetam (KEPPRA) 1000 MG glipiZIDE (GLUCOTROL) 10 MG tablet Take 1 tablet by mouth every morning (before breakfast) 8/4/16  Yes Kita Holguin MD   aspirin 81 MG chewable tablet Take 1 tablet by mouth daily 6/26/16  Yes Christina Marquez MD   Clobetasol Propionate 0.05 % LIQD Once daily for not more than 2 weeks at a time.   Patient not taking: Reported on 3/18/2020 10/2/19   Mata Rivas PA-C   acetaminophen (TYLENOL) 325 MG tablet Take 2 tablets by mouth every 6 hours as needed for Pain 7/22/19   Alhaji Dow MD   Glucose Blood (BLOOD GLUCOSE TEST STRIPS) STRP 1 strip by Does not apply route 4 times daily Indications: Mariam strips 8/21/17   Kita Holguin MD   loperamide (IMODIUM) 2 MG capsule Take 1 capsule by mouth as needed for Diarrhea  Patient not taking: Reported on 3/18/2020 1/9/17   Kita Holguin MD       Future Appointments   Date Time Provider Jenni Delgadoi   5/21/2020  2:00 PM Kita Holguin MD [facility] [address] [facility]

## 2020-03-23 RX ORDER — SPIRONOLACTONE 25 MG/1
TABLET ORAL
Qty: 90 TABLET | Refills: 3 | Status: ON HOLD | OUTPATIENT
Start: 2020-03-23 | End: 2021-01-01

## 2020-04-20 ENCOUNTER — CARE COORDINATION (OUTPATIENT)
Dept: CARE COORDINATION | Age: 80
End: 2020-04-20

## 2020-04-29 ENCOUNTER — TELEPHONE (OUTPATIENT)
Dept: PRIMARY CARE CLINIC | Age: 80
End: 2020-04-29

## 2020-05-05 ENCOUNTER — NURSE TRIAGE (OUTPATIENT)
Dept: OTHER | Facility: CLINIC | Age: 80
End: 2020-05-05

## 2020-05-05 ENCOUNTER — TELEPHONE (OUTPATIENT)
Dept: PRIMARY CARE CLINIC | Age: 80
End: 2020-05-05

## 2020-05-05 RX ORDER — INSULIN GLULISINE 100 [IU]/ML
INJECTION, SOLUTION SUBCUTANEOUS
Qty: 15 ML | Refills: 0 | Status: SHIPPED | OUTPATIENT
Start: 2020-05-05 | End: 2020-06-01

## 2020-05-05 NOTE — TELEPHONE ENCOUNTER
A triage note was sent. I spoke with Dr. Miriam Belle and she thinks the patient has an appointment today, but I did not see one scheduled until 5/21/2020. Patient should be seen today. If bleeds become worse, she needs to go to the ER.

## 2020-05-05 NOTE — TELEPHONE ENCOUNTER
Pt home care aide, Jay Siegel is calling. Jay Siegel states that Falguni Bhatti has had some nose bleeds lately- Pt states she remembers having 3 nose bleeds in the last month and they did not last longer than 15 mins. Pt states she has coughed up blood 2 times in the last month. No abnormal bruising. No congestion or any other sx. Glory Azar states her big toenail- left foot is almost all the way off. Jay Siegel states she thinks its fungal. Pt has been soaking it in epsom salt.

## 2020-05-05 NOTE — TELEPHONE ENCOUNTER
Patients care giver, Elizabeth Moore called in with patient on the line, stating that patient is c/o coughing up blood this morning only, also has had intermittent noes bleeds x 3 weeks, care giver states they have been increasing, lasting around 10 minutes. Patient unsure of length of nosebleeds, states only a couple of minutes, per care giver patient has memory issue. Patient has significant medical hx and takes blood thinners and daily baby Asprin. Left big toe, nail is hanging off, not currently bleeding, denies trauma, patient concerned of infection due to long term cellulitis in left lower extremity. Care giver denies any erythema, swelling, or tenderness to touch of left big toe. Care advice provided, patient reluctant to go to the doctor today, states she wasn't  Prepared but ultimately agreed with care advise, patient transferred to [facility] for appointment today. Patient and caregiver confirmed understanding of care advice and acknowledged plan. Reason for Disposition   Taking Coumadin (warfarin) or other strong blood thinner, or known bleeding disorder (e.g., thrombocytopenia)    Answer Assessment - Initial Assessment Questions  1. AMOUNT OF BLEEDING: \"How bad is the bleeding? \" \"How much blood was lost?\" \"Has the bleeding stopped? \"    - MILD: needed a couple tissues    - MODERATE: needed many tissues    - SEVERE: large blood clots, soaked many tissues, lasted more than 30 minutes       Moderate  2. ONSET: \"When did the nosebleed start? \"       3 weeks ago; had nosebleed last night and coughed up blood in mucus this morning  3. FREQUENCY: \"How many nosebleeds have you had in the last 24 hours? \"       one  4. RECURRENT SYMPTOMS: \"Have there been other recent nosebleeds? \" If so, ask: \"How long did it take you to stop the bleeding? \" \"What worked best?\"       Patient states it takes a couple of minutes to stop but care give states 10 minutes  5. CAUSE: \"What do you think caused this nosebleed? \"      Unsure; on blood thinners  6. LOCAL FACTORS: \"Do you have any cold symptoms? \", \"Have you been rubbing or picking at your nose? \"      no  7. SYSTEMIC FACTORS: \"Do you have high blood pressure or any bleeding problems? \"      High blood pressure but per care giver states blood pressure  8. BLOOD THINNERS: \"Do you take any blood thinners? \" (e.g., coumadin, heparin, aspirin, Plavix)      Yes, unsure of blood thinner and baby asprin daily  9. OTHER SYMPTOMS: \"Do you have any other symptoms? \" (e.g., lightheadedness)   No, but patient is concerned about left big toenail coming off and getting infected     10. PREGNANCY: \"Is there any chance you are pregnant? \" \"When was your last menstrual period? \"        no    Protocols used: NOSEBLEED-ADULT-[place]    [place] [id number] 2nd level triage completed, spoke with Daniel Solares and provider agrees that patient must be seen in the office today. Call soft transferred to [facility] at the [address] to schedule appointment, advised that patient must be seen today in the office, clean clinic, or urgent care. Please do not reply to the triage nurse through this encounter. Any subsequent communication should be directly with the patient.

## 2020-05-06 ENCOUNTER — OFFICE VISIT (OUTPATIENT)
Dept: PRIMARY CARE CLINIC | Age: 80
End: 2020-05-06
Payer: MEDICARE

## 2020-05-06 VITALS
HEART RATE: 63 BPM | SYSTOLIC BLOOD PRESSURE: 110 MMHG | DIASTOLIC BLOOD PRESSURE: 72 MMHG | TEMPERATURE: 97.5 F | BODY MASS INDEX: 49.5 KG/M2 | OXYGEN SATURATION: 100 % | WEIGHT: 262 LBS

## 2020-05-06 PROBLEM — R60.0 LOCALIZED EDEMA: Status: ACTIVE | Noted: 2020-05-06

## 2020-05-06 PROCEDURE — 99213 OFFICE O/P EST LOW 20 MIN: CPT | Performed by: FAMILY MEDICINE

## 2020-05-06 RX ORDER — METOLAZONE 5 MG/1
5 TABLET ORAL DAILY
Qty: 3 TABLET | Refills: 0 | Status: SHIPPED | OUTPATIENT
Start: 2020-05-06 | End: 2020-07-09

## 2020-05-06 RX ORDER — ECHINACEA PURPUREA EXTRACT 125 MG
1 TABLET ORAL 2 TIMES DAILY
Qty: 1 BOTTLE | Refills: 3 | COMMUNITY
Start: 2020-05-06

## 2020-05-06 ASSESSMENT — ENCOUNTER SYMPTOMS
COUGH: 0
SHORTNESS OF BREATH: 0
NAUSEA: 0
VOMITING: 0
DIARRHEA: 0

## 2020-05-06 NOTE — PROGRESS NOTES
[address] [facility] PRIMARY CARE  [address] [facility] [address]  De[address]: [address] is a 79 y.o. female who presents today for her medical conditions/complaintsas noted below. Chief Complaint   Patient presents with    Nail Problem     Patient states that her left big toe nail is falling off.  Epistaxis       HPI:     HPI- leg is also red. and swollen- left    Pt has ramp on her house- needs note stating that pt requires the ramp for her use due to her being unable to get up steps-    Has had a few nosebleeds this month.       LDL Cholesterol (mg/dL)   Date Value   06/24/2016 72   12/01/2015 68     LDL Calculated (mg/dL)   Date Value   03/11/2019 88   07/06/2017 76       (goal LDL is <100)   AST (U/L)   Date Value   11/27/2019 21     ALT (U/L)   Date Value   11/27/2019 18     BUN (mg/dL)   Date Value   11/27/2019 36 (H)     BP Readings from Last 3 Encounters:   05/06/20 110/72   02/20/20 110/60   01/24/20 132/78          (goal 120/80)    Past Medical History:   Diagnosis Date    Acute upper respiratory infection     Backache     Bell's palsy     Cellulitis     Cough     Diabetes mellitus (HCC)     H/O acute bronchitis with bronchospasm     H/O acute sinusitis     H/O allergic reaction     H/O allergic rhinitis     H/O tinea cruris     Hx of heart artery stent     heart stents x 4    Hyperlipidemia     Joint pain, hip     MI (myocardial infarction) ([address]     Morbid obesity ([address]     Otitis externa     Subarachnoid bleed ([address] march 28, 2016    Tendonitis, bicipital       Past Surgical History:   Procedure Laterality Date    CARDIAC CATHETERIZATION      heart stents x 4    CATARACT [facility]PLANT [facility]2018    [facility]    CATARACT REMOVAL WITH IMPLANT Right 03/05/2019    [facility]    INTRACAPSULAR CATARACT EXTRACTION Right 3/5/2019    EYE CATARACT EMULSIFICATION IOL IMPLANT performed by Manjeet Mcrae MD at [address] Left     [place] XCAPSL [place] RM[facility] INSJ IO LENS PROSTH W/O ECP Left 11/1/2018    EYE CATARACT EMULSIFICATION IOL IMPLANT performed by Manjeet Mcrae MD at [address]  12/9/15       Family History   Problem Relation Age of Onset    Diabetes Father     Hypertension Father     Cancer Father         colon cancer    Diabetes Mother     Heart Disease Mother     Diabetes Brother     Heart Disease Brother        Social History     Tobacco Use    Smoking status: Never Smoker    Smokeless tobacco: Never Used   Substance Use Topics    Alcohol use: No     Alcohol/week: 0.0 standard drinks     Comment: one glass of wine per year      Current Outpatient Medications   Medication Sig Dispense Refill    sodium chloride (ALTAMIST SPRAY) 0.65 % nasal spray 1 spray by Nasal route 2 times daily 1 Bottle 3    metOLazone (ZAROXOLYN) 5 MG tablet Take 1 tablet by mouth daily for 3 days 3 tablet 0    APIDRA SOLOSTAR 100 UNIT/ML injection pen inject 15 units subcutaneously four times a day before meals and nightly 15 mL 0    spironolactone (ALDACTONE) 25 MG tablet TAKE 1 TABLET DAILY 90 tablet 3    TRESIBA FLEXTOUCH 100 UNIT/ML SOPN Inject 70 Units into the skin nightly Patient is taking 60 units nightly 5 pen 0    levETIRAcetam (KEPPRA) 1000 MG tablet TAKE 1 TABLET TWICE A  tablet 3    Clobetasol Propionate 0.05 % LIQD Once daily for not more than 2 weeks at a time.  1 Bottle 0    acetaminophen (TYLENOL) 325 MG tablet Take 2 tablets by mouth every 6 hours as needed for Pain 40 tablet 0    simvastatin (ZOCOR) 20 MG tablet TAKE 1 TABLET NIGHTLY 90 tablet 3    Ginger, Zingiber officinalis, (GINGER PO) Take by mouth      Multiple Vitamins-Minerals (MULTIVITAMIN WOMEN PO) Take by mouth      albuterol sulfate HFA (PROVENTIL HFA) 108 (90 Base) MCG/ACT inhaler Inhale 2 puffs into the lungs every 6 hours as needed for Shortness of Breath (cough) 1 Inhaler 3    calcium carbonate (OYSTER SHELL CALCIUM 500 MG) 1250 (500 Ca) MG tablet TAKE 1 TABLET BY MOUTH ONE TIME A DAY  30 tablet 1    triamcinolone (KENALOG) 0.025 % ointment Apply 1 each topically 2 times daily as needed  1    Glucose Blood (BLOOD GLUCOSE TEST STRIPS) STRP 1 strip by Does not apply route 4 times daily Indications: Mariam strips 200 strip 3    furosemide (LASIX) 40 MG tablet Take 40 mg by mouth daily Prescribed by Dr. Hudson Orf (APPLE CIDER VINEGAR) TABS Take by mouth      vitamin D (ERGOCALCIFEROL) 58055 UNITS CAPS capsule TAKE 1 CAPSULE BY MOUTH ONE TIME A WEEK  4 capsule 2    clopidogrel (PLAVIX) 75 MG tablet Take 1 tablet by mouth daily 90 tablet 3    metoprolol tartrate (LOPRESSOR) 25 MG tablet Take 0.5 tablets by mouth 2 times daily Indications: per Cardiologist's order 90 tablet 3    loperamide (IMODIUM) 2 MG capsule Take 1 capsule by mouth as needed for Diarrhea 30 capsule 1    Insulin Syringe-Needle U-100 (INSULIN SYRINGE .5CC/31GX5/16\") 31G X 5/16\" 0.5 ML MISC 1 each by Does not apply route daily 100 each 3    glipiZIDE (GLUCOTROL) 10 MG tablet Take 1 tablet by mouth every morning (before breakfast) 90 tablet 3    aspirin 81 MG chewable tablet Take 1 tablet by mouth daily 30 tablet 3     No current facility-administered medications for this visit.       Allergies   Allergen Reactions    Lantus [Insulin Glargine]      Hives,itching    Metformin And Related Diarrhea    Prednisone Swelling     Facial swelling    Sertraline Other (See Comments)     hallucination    Amoxicillin Diarrhea and Nausea And Vomiting    Eggs Or Egg-Derived Products Rash    Novolog [Insulin Aspart] Itching and Rash    Zinc Itching and Rash       Health Maintenance   Topic Date Due    Shingles Vaccine (1 of 2) 03/02/1989    DEXA (modify frequency per FRAX score)  03/02/1994    DTaP/Tdap/Td vaccine (2 - Td) 09/14/2010    Annual Wellness Visit (AWV)  05/29/2019   

## 2020-05-14 ENCOUNTER — TELEPHONE (OUTPATIENT)
Dept: PRIMARY CARE CLINIC | Age: 80
End: 2020-05-14

## 2020-05-14 NOTE — TELEPHONE ENCOUNTER
Patients caregiver Piedad Yasmeen called for patient, I got verbal consent from the patient to speak with Piedad Arana since she is not on HIPAA. Patient was trying to get out of chair and started to have a nosebleed. This is becoming chronic, this nosebleed has been lasting 2 hours pressure to nose and using ice not helping. I asked Dr. Lonnie Roth because the patients caregiver was nervous and felt it was an emergency. Per Dr. Lonnie Roth she could take her to the ER or they place a tampon in her nose but she has to leave it there over night. Piedad Arana was notified and said that they will try using a tampon.

## 2020-06-01 RX ORDER — INSULIN GLULISINE 100 [IU]/ML
INJECTION, SOLUTION SUBCUTANEOUS
Qty: 15 ML | Refills: 0 | Status: SHIPPED | OUTPATIENT
Start: 2020-06-01 | End: 2020-06-22

## 2020-06-08 ENCOUNTER — TELEPHONE (OUTPATIENT)
Dept: PRIMARY CARE CLINIC | Age: 80
End: 2020-06-08

## 2020-06-15 ENCOUNTER — TELEPHONE (OUTPATIENT)
Dept: PRIMARY CARE CLINIC | Age: 80
End: 2020-06-15

## 2020-06-15 PROBLEM — N18.30 CKD (CHRONIC KIDNEY DISEASE) STAGE 3, GFR 30-59 ML/MIN (HCC): Status: ACTIVE | Noted: 2020-06-15

## 2020-06-15 NOTE — TELEPHONE ENCOUNTER
He ordered labs already- is supposed to go later this week to get it done- I see in his note that he ordered it- so nothing needs done from me other than he wanted her to f/u with me to see how the change was working

## 2020-06-18 ENCOUNTER — HOSPITAL ENCOUNTER (OUTPATIENT)
Age: 80
Setting detail: SPECIMEN
Discharge: HOME | End: 2020-06-18
Payer: MEDICARE

## 2020-06-18 LAB
ANION GAP SERPL CALCULATED.3IONS-SCNC: 18 MMOL/L (ref 9–17)
BUN BLDV-MCNC: 49 MG/DL (ref 8–23)
BUN/CREAT BLD: ABNORMAL (ref 9–20)
CALCIUM SERPL-MCNC: 9.7 MG/DL (ref 8.6–10.4)
CHLORIDE BLD-SCNC: 100 MMOL/L (ref 98–107)
CO2: 24 MMOL/L (ref 20–31)
CREAT SERPL-MCNC: 1.87 MG/DL (ref 0.5–0.9)
GFR AFRICAN AMERICAN: 31 ML/MIN
GFR NON-AFRICAN AMERICAN: 26 ML/MIN
GFR SERPL CREATININE-BSD FRML MDRD: ABNORMAL ML/MIN/{1.73_M2}
GFR SERPL CREATININE-BSD FRML MDRD: ABNORMAL ML/MIN/{1.73_M2}
GLUCOSE BLD-MCNC: 207 MG/DL (ref 70–99)
POTASSIUM SERPL-SCNC: 4.9 MMOL/L (ref 3.7–5.3)
SODIUM BLD-SCNC: 142 MMOL/L (ref 135–144)

## 2020-06-22 RX ORDER — INSULIN GLULISINE 100 [IU]/ML
INJECTION, SOLUTION SUBCUTANEOUS
Qty: 15 ML | Refills: 3 | Status: SHIPPED | OUTPATIENT
Start: 2020-06-22 | End: 2021-01-01 | Stop reason: SDUPTHER

## 2020-06-29 RX ORDER — SIMVASTATIN 20 MG
TABLET ORAL
Qty: 90 TABLET | Refills: 3 | Status: ON HOLD | OUTPATIENT
Start: 2020-06-29 | End: 2021-01-01 | Stop reason: HOSPADM

## 2020-07-06 ENCOUNTER — OFFICE VISIT (OUTPATIENT)
Dept: PRIMARY CARE CLINIC | Age: 80
End: 2020-07-06
Payer: MEDICARE

## 2020-07-06 VITALS
SYSTOLIC BLOOD PRESSURE: 118 MMHG | BODY MASS INDEX: 50.22 KG/M2 | HEART RATE: 79 BPM | TEMPERATURE: 97.3 F | WEIGHT: 265.8 LBS | OXYGEN SATURATION: 97 % | DIASTOLIC BLOOD PRESSURE: 72 MMHG

## 2020-07-06 PROCEDURE — 99213 OFFICE O/P EST LOW 20 MIN: CPT | Performed by: FAMILY MEDICINE

## 2020-07-06 ASSESSMENT — ENCOUNTER SYMPTOMS
COUGH: 0
EYE DISCHARGE: 0
SORE THROAT: 0
EYE REDNESS: 0
SHORTNESS OF BREATH: 0
VOMITING: 0
ABDOMINAL PAIN: 0
WHEEZING: 0
RHINORRHEA: 0
DIARRHEA: 0
NAUSEA: 0

## 2020-07-06 NOTE — PROGRESS NOTES
[facility] PRIMARY CARE  [address] [facility] [address]  Dept: [address] is a 79 y.o. female who presents today for her medical conditions/complaintsas noted below. Chief Complaint   Patient presents with    Medication Check       HPI:     HPI- pt doesn't want to take so many meds. Sugars running great. Lasix was bid per cardiology for 2 weeks, then got labs done- reviewed with pt- kidney function worse. Back on daily. Will get ignacio done.       LDL Cholesterol (mg/dL)   Date Value   06/24/2016 72   12/01/2015 68     LDL Calculated (mg/dL)   Date Value   03/11/2019 88   07/06/2017 76       (goal LDL is <100)   AST (U/L)   Date Value   11/27/2019 21     ALT (U/L)   Date Value   11/27/2019 18     BUN (mg/dL)   Date Value   06/18/2020 49 (H)     BP Readings from Last 3 Encounters:   07/06/20 118/72   05/06/20 110/72   02/20/20 110/60          (goal 120/80)    Past Medical History:   Diagnosis Date    Acute upper respiratory infection     Backache     Bell's palsy     Cellulitis     CKD (chronic kidney disease) stage 3, GFR 30-59 ml/min ([facility]) 6/15/2020    Cough     Diabetes mellitus ([address]     Diarrhea 12/8/2015    H/O acute bronchitis with bronchospasm     H/O acute sinusitis     H/O allergic reaction     H/O allergic rhinitis     H/O tinea cruris     Hx of heart artery stent     heart stents x 4    Hyperlipidemia     Joint pain, hip     MI (myocardial infarction) ([address]     Morbid obesity ([address]     Otitis externa     Subarachnoid bleed ([address] march 28, 2016    Tendonitis, bicipital       Past Surgical History:   Procedure Laterality Date    CARDIAC CATHETERIZATION      heart stents x 4    CATARACT REM[facility]AL [facility] IMPLANT [facility]2018    [facility]    CATARACT REMOVAL WITH IMPLANT Right 03/05/2019    [facility]    INTRACAPSULAR CATARACT EXTRACTION Right 3/5/2019    EYE CATARACT EMULSIFICATION IOL IMPLANT performed by Zee Sr MD at [address] Left     [facility] INSJ IO LENS PROSTH W/O ECP Left 11/1/2018    EYE CATARACT EMULSIFICATION IOL IMPLANT performed by [facility] [address] [facility] [address]  12/9/15       Family History   Problem Relation Age of Onset    Diabetes Father     Hypertension Father     Cancer Father         colon cancer    Diabetes Mother     Heart Disease Mother     Diabetes Brother     Heart Disease Brother        Social History     Tobacco Use    Smoking status: Never Smoker    Smokeless tobacco: Never Used   Substance Use Topics    Alcohol use: No     Alcohol/week: 0.0 standard drinks     Comment: one glass of wine per year      Current Outpatient Medications   Medication Sig Dispense Refill    simvastatin (ZOCOR) 20 MG tablet TAKE 1 TABLET NIGHTLY 90 tablet 3    APIDRA SOLOSTAR 100 UNIT/ML injection pen INJECT 15 UNITS SUBCUTANEOUSLY FOUR TIMES A DAY (BEFORE MEALS AND NIGHTLY) 15 mL 3    spironolactone (ALDACTONE) 25 MG tablet TAKE 1 TABLET DAILY 90 tablet 3    TRESIBA FLEXTOUCH 100 UNIT/ML SOPN Inject 70 Units into the skin nightly Patient is taking 60 units nightly 5 pen 0    levETIRAcetam (KEPPRA) 1000 MG tablet TAKE 1 TABLET TWICE A  tablet 3    Clobetasol Propionate 0.05 % LIQD Once daily for not more than 2 weeks at a time.  1 Bottle 0    Multiple Vitamins-Minerals (MULTIVITAMIN WOMEN PO) Take by mouth      albuterol sulfate HFA (PROVENTIL HFA) 108 (90 Base) MCG/ACT inhaler Inhale 2 puffs into the lungs every 6 hours as needed for Shortness of Breath (cough) 1 Inhaler 3    calcium carbonate (OYSTER SHELL CALCIUM 500 MG) 1250 (500 Ca) MG tablet TAKE 1 TABLET BY MOUTH ONE TIME A DAY  30 tablet 1    triamcinolone (KENALOG) 0.025 % ointment Apply 1 each topically 2 times daily as needed  1    Glucose Blood (BLOOD GLUCOSE TEST STRIPS) STRP 1 strip by Does not apply route 4 times daily Indications: Mariam strips 200 strip 3    furosemide (LASIX) 40 MG tablet Take 40 mg by mouth daily Prescribed by Dr. Valentina Mendoza (APPLE CIDER VINEGAR) TABS Take by mouth      vitamin D (ERGOCALCIFEROL) 47188 UNITS CAPS capsule TAKE 1 CAPSULE BY MOUTH ONE TIME A WEEK  4 capsule 2    clopidogrel (PLAVIX) 75 MG tablet Take 1 tablet by mouth daily 90 tablet 3    metoprolol tartrate (LOPRESSOR) 25 MG tablet Take 0.5 tablets by mouth 2 times daily Indications: per Cardiologist's order 90 tablet 3    Insulin Syringe-Needle U-100 (INSULIN SYRINGE .5CC/31GX5/16\") 31G X 5/16\" 0.5 ML MISC 1 each by Does not apply route daily 100 each 3    glipiZIDE (GLUCOTROL) 10 MG tablet Take 1 tablet by mouth every morning (before breakfast) 90 tablet 3    aspirin 81 MG chewable tablet Take 1 tablet by mouth daily (Patient taking differently: Take 81 mg by mouth 2 times daily ) 30 tablet 3    sodium chloride (ALTAMIST SPRAY) 0.65 % nasal spray 1 spray by Nasal route 2 times daily (Patient not taking: Reported on 7/6/2020) 1 Bottle 3    metOLazone (ZAROXOLYN) 5 MG tablet Take 1 tablet by mouth daily for 3 days (Patient not taking: Reported on 7/6/2020) 3 tablet 0    acetaminophen (TYLENOL) 325 MG tablet Take 2 tablets by mouth every 6 hours as needed for Pain (Patient not taking: Reported on 7/6/2020) 40 tablet 0    Ginger, Zingiber officinalis, (GINGER PO) Take by mouth      loperamide (IMODIUM) 2 MG capsule Take 1 capsule by mouth as needed for Diarrhea (Patient not taking: Reported on 7/6/2020) 30 capsule 1     No current facility-administered medications for this visit.       Allergies   Allergen Reactions    Lantus [Insulin Glargine]      Hives,itching    Metformin And Related Diarrhea    Prednisone Swelling     Facial swelling    Sertraline Other (See Comments)     hallucination    Amoxicillin Diarrhea and Nausea And Vomiting    Eggs Or Egg-Derived Products Rash    Novolog [Insulin Aspart] Itching and Rash    Zinc Itching and Rash       Health Maintenance   Topic Date Due    Shingles Vaccine (1 of 2) 03/02/1989    DEXA (modify frequency per FRAX score)  03/02/1994    DTaP/Tdap/Td vaccine (2 - Td) 09/14/2010    Annual Wellness Visit (AWV)  05/29/2019    Lipid screen  03/11/2020    Flu vaccine (1) 09/01/2020    Potassium monitoring  06/18/2021    Creatinine monitoring  06/18/2021    Pneumococcal 65+ years Vaccine  Completed    Hepatitis A vaccine  Aged Out    Hib vaccine  Aged Out    Meningococcal (ACWY) vaccine  Aged Out       Subjective:      Review of Systems   Constitutional: Negative for chills and fever. HENT: Negative for nosebleeds (used really old Vicks and they stopped bleeding), rhinorrhea and sore throat. Eyes: Negative for discharge and redness. Respiratory: Negative for cough, shortness of breath and wheezing. Cardiovascular: Negative for chest pain and palpitations. Gastrointestinal: Negative for abdominal pain, diarrhea, nausea and vomiting. Genitourinary: Negative for dysuria and frequency. Musculoskeletal: Negative for arthralgias and myalgias. Neurological: Negative for dizziness, light-headedness and headaches. Psychiatric/Behavioral: Negative for sleep disturbance. Objective:     /72   Pulse 79   Temp 97.3 °F (36.3 °C)   Wt 265 lb 12.8 oz (120.6 kg)   SpO2 97%   BMI 50.22 kg/m²   Physical Exam  Vitals signs and nursing note reviewed. Constitutional:       General: She is not in acute distress. Appearance: She is well-developed. HENT:      Head: Normocephalic and atraumatic. Eyes:      General: No scleral icterus. Right eye: No discharge. Left eye: No discharge. Conjunctiva/sclera: Conjunctivae normal.      Pupils: Pupils are equal, round, and reactive to light. Neck:      Thyroid: No thyromegaly. Trachea: No tracheal deviation.    Cardiovascular:      Rate and Rhythm: Normal rate and

## 2020-07-08 ENCOUNTER — HOSPITAL ENCOUNTER (OUTPATIENT)
Age: 80
Setting detail: SPECIMEN
Discharge: HOME | End: 2020-07-08
Payer: MEDICARE

## 2020-07-08 LAB
ANION GAP SERPL CALCULATED.3IONS-SCNC: 15 MMOL/L (ref 9–17)
BUN BLDV-MCNC: 37 MG/DL (ref 8–23)
BUN/CREAT BLD: ABNORMAL (ref 9–20)
CALCIUM SERPL-MCNC: 9.6 MG/DL (ref 8.6–10.4)
CHLORIDE BLD-SCNC: 97 MMOL/L (ref 98–107)
CO2: 28 MMOL/L (ref 20–31)
CREAT SERPL-MCNC: 1.71 MG/DL (ref 0.5–0.9)
GFR AFRICAN AMERICAN: 35 ML/MIN
GFR NON-AFRICAN AMERICAN: 29 ML/MIN
GFR SERPL CREATININE-BSD FRML MDRD: ABNORMAL ML/MIN/{1.73_M2}
GFR SERPL CREATININE-BSD FRML MDRD: ABNORMAL ML/MIN/{1.73_M2}
GLUCOSE BLD-MCNC: 113 MG/DL (ref 70–99)
POTASSIUM SERPL-SCNC: 4.6 MMOL/L (ref 3.7–5.3)
SODIUM BLD-SCNC: 140 MMOL/L (ref 135–144)

## 2020-07-09 ENCOUNTER — TELEPHONE (OUTPATIENT)
Dept: PRIMARY CARE CLINIC | Age: 80
End: 2020-07-09

## 2020-07-09 NOTE — TELEPHONE ENCOUNTER
[facility] calling and states the patient told them that her APIDRA SOLOSTAR 100 UNIT/ML injection pen is changed to a total max of 85 units a day and he will need to have a new script. Faxed sent to Dr. Mejia Correa office requesting OV notes to support what the patient is saying.

## 2020-07-14 RX ORDER — INSULIN GLULISINE 100 [IU]/ML
15 INJECTION, SOLUTION SUBCUTANEOUS 4 TIMES DAILY
Qty: 15 ML | Refills: 3 | OUTPATIENT
Start: 2020-07-14

## 2020-07-15 PROBLEM — N18.30 BENIGN HYPERTENSION WITH CKD (CHRONIC KIDNEY DISEASE) STAGE III (HCC): Status: ACTIVE | Noted: 2020-07-15

## 2020-07-15 PROBLEM — M19.90 ARTHRITIS: Status: ACTIVE | Noted: 2017-06-23

## 2020-07-15 PROBLEM — E11.65 HYPERGLYCEMIA DUE TO TYPE 2 DIABETES MELLITUS (HCC): Status: ACTIVE | Noted: 2020-07-15

## 2020-07-15 PROBLEM — R07.9 CHEST PAIN OF UNCERTAIN ETIOLOGY: Status: ACTIVE | Noted: 2018-03-27

## 2020-07-15 PROBLEM — I45.10 RIGHT BUNDLE-BRANCH BLOCK: Status: ACTIVE | Noted: 2017-07-20

## 2020-07-15 PROBLEM — G51.0 BELL'S PALSY: Status: ACTIVE | Noted: 2020-07-15

## 2020-07-15 PROBLEM — R60.0 EDEMA OF BOTH LEGS: Status: ACTIVE | Noted: 2017-07-20

## 2020-07-15 PROBLEM — R05.8 DRY COUGH: Status: ACTIVE | Noted: 2018-03-27

## 2020-07-15 PROBLEM — E87.5 HYPERKALEMIA: Status: ACTIVE | Noted: 2017-07-20

## 2020-07-15 PROBLEM — R23.3 EASY BRUISING: Status: ACTIVE | Noted: 2018-11-29

## 2020-07-15 PROBLEM — I12.9 BENIGN HYPERTENSION WITH CKD (CHRONIC KIDNEY DISEASE) STAGE III (HCC): Status: ACTIVE | Noted: 2020-07-15

## 2020-07-15 PROBLEM — Z95.5 PRESENCE OF DRUG COATED STENT IN RIGHT CORONARY ARTERY: Status: ACTIVE | Noted: 2018-03-27

## 2020-07-29 ENCOUNTER — TELEPHONE (OUTPATIENT)
Dept: PRIMARY CARE CLINIC | Age: 80
End: 2020-07-29

## 2020-07-29 NOTE — TELEPHONE ENCOUNTER
Pharmacy asking for a new rx for apridra, she was increased by the endocrinologist and is currently taking 25 with breakfast and 30 units for both lunch and dinner doses.  pharm [facility]

## 2020-08-11 ENCOUNTER — APPOINTMENT (OUTPATIENT)
Dept: GENERAL RADIOLOGY | Age: 80
End: 2020-08-11
Payer: MEDICARE

## 2020-08-11 ENCOUNTER — HOSPITAL ENCOUNTER (OUTPATIENT)
Age: 80
Setting detail: OBSERVATION
Discharge: HOME HEALTH | End: 2020-08-12
Attending: EMERGENCY MEDICINE | Admitting: FAMILY MEDICINE
Payer: MEDICARE

## 2020-08-11 ENCOUNTER — NURSE TRIAGE (OUTPATIENT)
Dept: OTHER | Facility: CLINIC | Age: 80
End: 2020-08-11

## 2020-08-11 PROBLEM — L03.90 CELLULITIS: Status: ACTIVE | Noted: 2020-08-11

## 2020-08-11 LAB
-: ABNORMAL
ABSOLUTE EOS #: 0.3 K/UL (ref 0–0.4)
ABSOLUTE IMMATURE GRANULOCYTE: ABNORMAL K/UL (ref 0–0.3)
ABSOLUTE LYMPH #: 2.4 K/UL (ref 1–4.8)
ABSOLUTE MONO #: 0.9 K/UL (ref 0.1–1.3)
ALBUMIN SERPL-MCNC: 3.8 G/DL (ref 3.5–5.2)
ALBUMIN/GLOBULIN RATIO: ABNORMAL (ref 1–2.5)
ALP BLD-CCNC: 90 U/L (ref 35–104)
ALT SERPL-CCNC: 16 U/L (ref 5–33)
AMORPHOUS: ABNORMAL
ANION GAP SERPL CALCULATED.3IONS-SCNC: 11 MMOL/L (ref 9–17)
AST SERPL-CCNC: 17 U/L
BACTERIA: ABNORMAL
BASOPHILS # BLD: 1 % (ref 0–2)
BASOPHILS ABSOLUTE: 0 K/UL (ref 0–0.2)
BILIRUB SERPL-MCNC: 0.26 MG/DL (ref 0.3–1.2)
BILIRUBIN URINE: NEGATIVE
BNP INTERPRETATION: NORMAL
BUN BLDV-MCNC: 33 MG/DL (ref 8–23)
BUN/CREAT BLD: ABNORMAL (ref 9–20)
CALCIUM SERPL-MCNC: 9.6 MG/DL (ref 8.6–10.4)
CASTS UA: ABNORMAL /LPF
CHLORIDE BLD-SCNC: 100 MMOL/L (ref 98–107)
CO2: 27 MMOL/L (ref 20–31)
COLOR: YELLOW
COMMENT UA: ABNORMAL
CREAT SERPL-MCNC: 1.15 MG/DL (ref 0.5–0.9)
CRYSTALS, UA: ABNORMAL /HPF
DIFFERENTIAL TYPE: ABNORMAL
EOSINOPHILS RELATIVE PERCENT: 3 % (ref 0–4)
EPITHELIAL CELLS UA: ABNORMAL /HPF
GFR AFRICAN AMERICAN: 55 ML/MIN
GFR NON-AFRICAN AMERICAN: 45 ML/MIN
GFR SERPL CREATININE-BSD FRML MDRD: ABNORMAL ML/MIN/{1.73_M2}
GFR SERPL CREATININE-BSD FRML MDRD: ABNORMAL ML/MIN/{1.73_M2}
GLUCOSE BLD-MCNC: 162 MG/DL (ref 65–105)
GLUCOSE BLD-MCNC: 198 MG/DL (ref 70–99)
GLUCOSE URINE: NEGATIVE
HCT VFR BLD CALC: 39.5 % (ref 36–46)
HEMOGLOBIN: 13 G/DL (ref 12–16)
IMMATURE GRANULOCYTES: ABNORMAL %
KETONES, URINE: NEGATIVE
LEUKOCYTE ESTERASE, URINE: ABNORMAL
LYMPHOCYTES # BLD: 24 % (ref 24–44)
MCH RBC QN AUTO: 32.3 PG (ref 26–34)
MCHC RBC AUTO-ENTMCNC: 33 G/DL (ref 31–37)
MCV RBC AUTO: 98.1 FL (ref 80–100)
MONOCYTES # BLD: 9 % (ref 1–7)
MUCUS: ABNORMAL
NITRITE, URINE: NEGATIVE
NRBC AUTOMATED: ABNORMAL PER 100 WBC
OTHER OBSERVATIONS UA: ABNORMAL
PDW BLD-RTO: 14 % (ref 11.5–14.9)
PH UA: 7 (ref 5–8)
PLATELET # BLD: 222 K/UL (ref 150–450)
PLATELET ESTIMATE: ABNORMAL
PMV BLD AUTO: 8.4 FL (ref 6–12)
POTASSIUM SERPL-SCNC: 4.6 MMOL/L (ref 3.7–5.3)
PRO-BNP: 165 PG/ML
PROTEIN UA: NEGATIVE
RBC # BLD: 4.03 M/UL (ref 4–5.2)
RBC # BLD: ABNORMAL 10*6/UL
RBC UA: ABNORMAL /HPF
RENAL EPITHELIAL, UA: ABNORMAL /HPF
SEG NEUTROPHILS: 63 % (ref 36–66)
SEGMENTED NEUTROPHILS ABSOLUTE COUNT: 6.2 K/UL (ref 1.3–9.1)
SODIUM BLD-SCNC: 138 MMOL/L (ref 135–144)
SPECIFIC GRAVITY UA: 1.01 (ref 1–1.03)
TOTAL PROTEIN: 7.7 G/DL (ref 6.4–8.3)
TRICHOMONAS: ABNORMAL
TROPONIN INTERP: ABNORMAL
TROPONIN INTERP: ABNORMAL
TROPONIN T: ABNORMAL NG/ML
TROPONIN T: ABNORMAL NG/ML
TROPONIN, HIGH SENSITIVITY: 24 NG/L (ref 0–14)
TROPONIN, HIGH SENSITIVITY: 25 NG/L (ref 0–14)
TURBIDITY: CLEAR
URINE HGB: NEGATIVE
UROBILINOGEN, URINE: NORMAL
WBC # BLD: 10 K/UL (ref 3.5–11)
WBC # BLD: ABNORMAL 10*3/UL
WBC UA: ABNORMAL /HPF
YEAST: ABNORMAL

## 2020-08-11 PROCEDURE — 81001 URINALYSIS AUTO W/SCOPE: CPT

## 2020-08-11 PROCEDURE — 87086 URINE CULTURE/COLONY COUNT: CPT

## 2020-08-11 PROCEDURE — G0378 HOSPITAL OBSERVATION PER HR: HCPCS

## 2020-08-11 PROCEDURE — 2580000003 HC RX 258: Performed by: EMERGENCY MEDICINE

## 2020-08-11 PROCEDURE — 84484 ASSAY OF TROPONIN QUANT: CPT

## 2020-08-11 PROCEDURE — 93005 ELECTROCARDIOGRAM TRACING: CPT | Performed by: EMERGENCY MEDICINE

## 2020-08-11 PROCEDURE — 6370000000 HC RX 637 (ALT 250 FOR IP): Performed by: EMERGENCY MEDICINE

## 2020-08-11 PROCEDURE — 83880 ASSAY OF NATRIURETIC PEPTIDE: CPT

## 2020-08-11 PROCEDURE — 6360000002 HC RX W HCPCS: Performed by: EMERGENCY MEDICINE

## 2020-08-11 PROCEDURE — 87040 BLOOD CULTURE FOR BACTERIA: CPT

## 2020-08-11 PROCEDURE — 96365 THER/PROPH/DIAG IV INF INIT: CPT

## 2020-08-11 PROCEDURE — 80053 COMPREHEN METABOLIC PANEL: CPT

## 2020-08-11 PROCEDURE — 85025 COMPLETE CBC W/AUTO DIFF WBC: CPT

## 2020-08-11 PROCEDURE — 96367 TX/PROPH/DG ADDL SEQ IV INF: CPT

## 2020-08-11 PROCEDURE — 82947 ASSAY GLUCOSE BLOOD QUANT: CPT

## 2020-08-11 PROCEDURE — 71045 X-RAY EXAM CHEST 1 VIEW: CPT

## 2020-08-11 PROCEDURE — 99285 EMERGENCY DEPT VISIT HI MDM: CPT

## 2020-08-11 PROCEDURE — 36415 COLL VENOUS BLD VENIPUNCTURE: CPT

## 2020-08-11 PROCEDURE — 96372 THER/PROPH/DIAG INJ SC/IM: CPT

## 2020-08-11 RX ORDER — CALCIUM CARBONATE 500(1250)
1250 TABLET ORAL DAILY
Status: DISCONTINUED | OUTPATIENT
Start: 2020-08-12 | End: 2020-08-12 | Stop reason: HOSPADM

## 2020-08-11 RX ORDER — FUROSEMIDE 40 MG/1
40 TABLET ORAL DAILY
Status: DISCONTINUED | OUTPATIENT
Start: 2020-08-12 | End: 2020-08-12 | Stop reason: HOSPADM

## 2020-08-11 RX ORDER — LOPERAMIDE HYDROCHLORIDE 2 MG/1
2 CAPSULE ORAL PRN
Status: DISCONTINUED | OUTPATIENT
Start: 2020-08-11 | End: 2020-08-12 | Stop reason: HOSPADM

## 2020-08-11 RX ORDER — ACETAMINOPHEN 325 MG/1
650 TABLET ORAL EVERY 4 HOURS PRN
Status: DISCONTINUED | OUTPATIENT
Start: 2020-08-11 | End: 2020-08-12 | Stop reason: HOSPADM

## 2020-08-11 RX ORDER — LEVETIRACETAM 500 MG/1
1000 TABLET ORAL 2 TIMES DAILY
Status: DISCONTINUED | OUTPATIENT
Start: 2020-08-11 | End: 2020-08-12 | Stop reason: HOSPADM

## 2020-08-11 RX ORDER — SODIUM CHLORIDE 0.9 % (FLUSH) 0.9 %
10 SYRINGE (ML) INJECTION PRN
Status: DISCONTINUED | OUTPATIENT
Start: 2020-08-11 | End: 2020-08-12 | Stop reason: HOSPADM

## 2020-08-11 RX ORDER — ASPIRIN 81 MG/1
81 TABLET, CHEWABLE ORAL 2 TIMES DAILY
Status: DISCONTINUED | OUTPATIENT
Start: 2020-08-11 | End: 2020-08-12 | Stop reason: HOSPADM

## 2020-08-11 RX ORDER — SPIRONOLACTONE 25 MG/1
25 TABLET ORAL DAILY
Status: DISCONTINUED | OUTPATIENT
Start: 2020-08-12 | End: 2020-08-12 | Stop reason: HOSPADM

## 2020-08-11 RX ORDER — SODIUM CHLORIDE 0.9 % (FLUSH) 0.9 %
10 SYRINGE (ML) INJECTION EVERY 12 HOURS SCHEDULED
Status: DISCONTINUED | OUTPATIENT
Start: 2020-08-11 | End: 2020-08-12 | Stop reason: HOSPADM

## 2020-08-11 RX ORDER — ATORVASTATIN CALCIUM 10 MG/1
10 TABLET, FILM COATED ORAL DAILY
Status: DISCONTINUED | OUTPATIENT
Start: 2020-08-12 | End: 2020-08-12 | Stop reason: HOSPADM

## 2020-08-11 RX ORDER — CLOPIDOGREL BISULFATE 75 MG/1
75 TABLET ORAL DAILY
Status: DISCONTINUED | OUTPATIENT
Start: 2020-08-12 | End: 2020-08-12 | Stop reason: HOSPADM

## 2020-08-11 RX ORDER — GLIPIZIDE 5 MG/1
10 TABLET ORAL
Status: DISCONTINUED | OUTPATIENT
Start: 2020-08-12 | End: 2020-08-12 | Stop reason: HOSPADM

## 2020-08-11 RX ADMIN — CEFTRIAXONE SODIUM 1 G: 1 INJECTION, POWDER, FOR SOLUTION INTRAMUSCULAR; INTRAVENOUS at 21:32

## 2020-08-11 RX ADMIN — ENOXAPARIN SODIUM 30 MG: 30 INJECTION SUBCUTANEOUS at 23:52

## 2020-08-11 RX ADMIN — VANCOMYCIN HYDROCHLORIDE 2500 MG: 1.5 INJECTION, POWDER, LYOPHILIZED, FOR SOLUTION INTRAVENOUS at 21:50

## 2020-08-11 RX ADMIN — ASPIRIN 81 MG: 81 TABLET, CHEWABLE ORAL at 23:54

## 2020-08-11 RX ADMIN — Medication 10 ML: at 23:58

## 2020-08-11 RX ADMIN — METOPROLOL TARTRATE 12.5 MG: 25 TABLET, FILM COATED ORAL at 23:54

## 2020-08-11 RX ADMIN — LEVETIRACETAM 1000 MG: 500 TABLET ORAL at 23:55

## 2020-08-11 RX ADMIN — Medication 1 MG: at 22:10

## 2020-08-11 ASSESSMENT — ENCOUNTER SYMPTOMS
NAUSEA: 0
VOMITING: 0
ABDOMINAL PAIN: 0
SHORTNESS OF BREATH: 0
CONSTIPATION: 1
DIARRHEA: 0
COUGH: 0
SORE THROAT: 0

## 2020-08-11 ASSESSMENT — PAIN SCALES - GENERAL: PAINLEVEL_OUTOF10: 0

## 2020-08-11 NOTE — ED PROVIDER NOTES
[address] ED  eMERGENCY dEPARTMENT eNCOUnter   Attending Attestation     Pt Name: Almita Waters  MRN: [id number]  [place] 1940  Date of evaluation: 8/11/20    History, EXAM, MDM:    Almita Waters is a 79 y.o. female who presents with Cellulitis and Leg Swelling    Presenting with cellulitis of her left leg and bilateral lower extremity edema. Taking Lasix 40 mg once daily. Cellulitis to the left lower leg worsening over the last few days with some drainage. Laboratory studies show a WBC of 10. Starting antibiotics. Lasix. Vitals:   Vitals:    08/11/20 1641   BP: 117/67   Pulse: 78   Resp: 16   Temp: 98 °F (36.7 °C)   TempSrc: Oral   SpO2: 95%   Weight: 265 lb (120.2 kg)   Height: 5' 2\" (1.575 m)     I performed a history and physical examination of the patient and discussed management with the resident. I reviewed the residents note and agree with the documented findings and plan of care. Any areas of disagreement are noted on the chart. I was personally present for the key portions of any procedures. I have documented in the chart those procedures where I was not present during the key portions. I have personally reviewed all images and agree with the resident's interpretation. I have reviewed the emergency nurses triage note. I agree with the chief complaint, past medical history, past surgical history, allergies, medications, social and family history as documented unless otherwise noted below. Documentation of the HPI, Physical Exam and Medical Decision Making performed by medical students or scribes is based on my personal performance of the HPI, PE and MDM. For Phys Assistant/ Nurse Practitioner cases/documentation I have had a face to face evaluation of this patient and have completed at least one if not all key elements of the E/M (history, physical exam, and MDM). Additional findings are as noted.     Page Zhang MD  Attending Emergency  Physician Lanre Sow MD  08/12/20 [id number]

## 2020-08-11 NOTE — TELEPHONE ENCOUNTER
Reason for Disposition   Black (necrotic) or blisters develop in wound    Protocols used: WOUND INFECTION-ADULT-[place]    Received call from [address] Caller is reporting L lower leg redness and infection. When care taker took off bandage lower leg was foul smelling, discolored and skin blistered and peeling. Wound is also seeping. Patient cannot give me a length of time leg has been infected. Caretaker reports that she feels warm and has a headache. Caretaker from [employer] will take patient to the ER now. Call soft transferred to [address] to schedule appointment. Please do not reply to the triage nurse through this encounter. Any subsequent communication should be directly with the patient.

## 2020-08-11 NOTE — ED PROVIDER NOTES
Vena Cava Filter Placement (12/9/15); Cataract removal with implant (Left, 11/01/2018); pr xcapsl ctrc rmvl insj io lens prosth w/o ecp (Left, 11/1/2018); Cataract removal with implant (Right, 03/05/2019); and Intracapsular cataract extraction (Right, 3/5/2019). Social History     Socioeconomic History    Marital status:      Spouse name: Not on file    Number of children: Not on file    Years of education: Not on file    Highest education level: Not on file   Occupational History    Occupation: retired   Social Needs    Financial resource strain: Not hard at all   [employer] insecurity     Worry: Never true     Inability: Never true   [employer] needs     Medical: No     Non-medical: No   Tobacco Use    Smoking status: Never Smoker    Smokeless tobacco: Never Used   Substance and Sexual Activity    Alcohol use: No     Alcohol/week: 0.0 standard drinks     Comment: one glass of wine per year    Drug use: No    Sexual activity: Not Currently   Lifestyle    Physical activity     Days per week: 0 days     Minutes per session: 0 min    Stress: Only a little   Relationships    Social connections     Talks on phone: Three times a week     Gets together: More than three times a week     Attends Jain service: More than 4 times per year     Active member of club or organization: No     Attends meetings of clubs or organizations: Never     Relationship status:      Intimate partner violence     Fear of current or ex partner: Not on file     Emotionally abused: Not on file     Physically abused: Not on file     Forced sexual activity: Not on file   Other Topics Concern    Not on file   Social History Narrative    Not on file       Family History   Problem Relation Age of Onset    Diabetes Father     Hypertension Father     Cancer Father         colon cancer    Diabetes Mother     Heart Disease Mother     Diabetes Brother     Heart Disease Brother        Allergies:  Lantus [insulin glargine]; Metformin and related; Prednisone; Sertraline; Amoxicillin; Eggs or egg-derived products; Novolog [insulin aspart]; and Zinc    Home Medications:  Prior to Admission medications    Medication Sig Start Date End Date Taking? Authorizing Provider   simvastatin (ZOCOR) 20 MG tablet TAKE 1 TABLET NIGHTLY 6/29/20   Yessenia Powers MD   APIDRA SOLOSTAR 100 UNIT/ML injection pen INJECT 15 UNITS SUBCUTANEOUSLY FOUR TIMES A DAY (BEFORE MEALS AND NIGHTLY) 6/22/20   Yessenia Powers MD   sodium chloride (ALTAMIST SPRAY) 0.65 % nasal spray 1 spray by Nasal route 2 times daily  Patient not taking: Reported on 7/6/2020 5/6/20   Yessenia Powers MD   spironolactone (ALDACTONE) 25 MG tablet TAKE 1 TABLET DAILY 3/23/20   Yessenia Powers MD   TRESIBA FLEXTOUCH 100 UNIT/ML SOPN Inject 70 Units into the skin nightly Patient is taking 60 units nightly 2/20/20   Yessenia Powers MD   levETIRAcetam (KEPPRA) 1000 MG tablet TAKE 1 TABLET TWICE A DAY 2/13/20   Yessenia Powers MD   Clobetasol Propionate 0.05 % LIQD Once daily for not more than 2 weeks at a time.  10/2/19   Yemi Chopra PA-C   acetaminophen (TYLENOL) 325 MG tablet Take 2 tablets by mouth every 6 hours as needed for Pain  Patient not taking: Reported on 7/6/2020 7/22/19   Nacho Verma MD   Ginger, Zingiber officinalis, (GINGER PO) Take by mouth    Historical Provider, MD   Multiple Vitamins-Minerals (MULTIVITAMIN WOMEN PO) Take by mouth    Historical Provider, MD   albuterol sulfate HFA (PROVENTIL HFA) 108 (90 Base) MCG/ACT inhaler Inhale 2 puffs into the lungs every 6 hours as needed for Shortness of Breath (cough) 3/23/18   Yessenia Powers MD   calcium carbonate (OYSTER SHELL CALCIUM 500 MG) 1250 (500 Ca) MG tablet TAKE 1 TABLET BY MOUTH ONE TIME A DAY  1/22/18   Yessenia Powers MD   triamcinolone (KENALOG) 0.025 % ointment Apply 1 each topically 2 times daily as needed 12/7/17   Historical Provider, MD Glucose Blood (BLOOD GLUCOSE TEST STRIPS) STRP 1 strip by Does not apply route 4 times daily Indications: Mariam strips 8/21/17   Rosanne Anne MD   furosemide (LASIX) 40 MG tablet Take 40 mg by mouth daily Prescribed by Dr. Daniel Reyes Provider, MD   Misc Natural Products (APPLE CIDER VINEGAR) TABS Take by mouth    Historical Provider, MD   vitamin D (ERGOCALCIFEROL) 45425 UNITS CAPS capsule TAKE 1 CAPSULE BY MOUTH ONE TIME A WEEK  3/7/17   Rosanne Anne MD   clopidogrel (PLAVIX) 75 MG tablet Take 1 tablet by mouth daily 2/13/17   Rosanne Anne MD   metoprolol tartrate (LOPRESSOR) 25 MG tablet Take 0.5 tablets by mouth 2 times daily Indications: per Cardiologist's order 2/13/17   Rosanne Anne MD   loperamide (IMODIUM) 2 MG capsule Take 1 capsule by mouth as needed for Diarrhea  Patient not taking: Reported on 7/6/2020 1/9/17   Rosanne Anne MD   Insulin Syringe-Needle U-100 (INSULIN SYRINGE .5CC/31GX5/16\") 31G X 5/16\" 0.5 ML MISC 1 each by Does not apply route daily 11/4/16   Rosanne Anne MD   glipiZIDE (GLUCOTROL) 10 MG tablet Take 1 tablet by mouth every morning (before breakfast) 8/4/16   Rosanne Anne MD   aspirin 81 MG chewable tablet Take 1 tablet by mouth daily  Patient taking differently: Take 81 mg by mouth 2 times daily  6/26/16   Guillermina Arrieta MD       REVIEW OF SYSTEMS    (2-9 systems for level 4, 10 or more for level 5)      Review of Systems   Constitutional: Negative for chills and fever. HENT: Negative for ear pain, hearing loss and sore throat. Eyes: Negative for visual disturbance. Respiratory: Negative for cough and shortness of breath. Cardiovascular: Positive for leg swelling. Negative for chest pain. Gastrointestinal: Positive for constipation (\"A little bit\"). Negative for abdominal pain, diarrhea, nausea and vomiting. Genitourinary: Negative for difficulty urinating and dysuria.    Musculoskeletal: Negative for arthralgias and myalgias. Skin: Positive for rash. Neurological: Negative for numbness. PHYSICAL EXAM   (up to 7 for level 4, 8 or more for level 5)      INITIAL VITALS:   BP (!) 109/35   Pulse 92   Temp 98.2 °F (36.8 °C) (Axillary)   Resp 16   Ht 5' 2\" (1.575 m)   Wt 238 lb 1.6 oz (108 kg)   SpO2 96%   BMI 43.55 kg/m²     Physical Exam  Vitals signs and nursing note reviewed. Constitutional:       General: She is not in acute distress. Appearance: Normal appearance. She is well-developed. She is not ill-appearing or diaphoretic. HENT:      Head: Normocephalic and atraumatic. Right Ear: External ear normal.      Left Ear: External ear normal.      Nose: Nose normal.      Mouth/Throat:      Mouth: Mucous membranes are moist.   Eyes:      Extraocular Movements: Extraocular movements intact. Conjunctiva/sclera: Conjunctivae normal.   Neck:      Musculoskeletal: Normal range of motion and neck supple. Trachea: No tracheal deviation. Cardiovascular:      Rate and Rhythm: Normal rate and regular rhythm. Heart sounds: Normal heart sounds. No murmur. No friction rub. No gallop. Pulmonary:      Effort: Pulmonary effort is normal. No respiratory distress. Breath sounds: Normal breath sounds. No wheezing, rhonchi or rales. Abdominal:      General: There is no distension. Palpations: Abdomen is soft. There is no mass. Tenderness: There is no abdominal tenderness. There is no guarding or rebound. Musculoskeletal: Normal range of motion. General: No swelling, deformity or signs of injury. Skin:     General: Skin is warm and dry. Capillary Refill: Capillary refill takes less than 2 seconds. Coloration: Skin is not jaundiced. Findings: Rash (A defined area of erythema and warmth is present on the anterior left leg greater than right.   Some diffuse tenderness to palpation of the bilateral lower extremities with pitting edema noted.  Good pulses distally.) present. No bruising or lesion. Neurological:      General: No focal deficit present. Mental Status: She is alert. Mental status is at baseline. Motor: No abnormal muscle tone. Comments: Patient knows she is at [facility] and has good insight into her condition but stated that she did not remember the year and did not know who the president was but stated that she did not like him.          DIFFERENTIAL  DIAGNOSIS     PLAN (LABS / IMAGING / EKG):  Orders Placed This Encounter   Procedures    Culture, Urine    Culture, Blood 1    Culture, Blood 2    XR CHEST PORTABLE    CBC Auto Differential    Comprehensive Metabolic Panel w/ Reflex to MG    Urinalysis, reflex to microscopic    Brain Natriuretic Peptide    Troponin    Troponin    Microscopic Urinalysis    Diet NPO, After Midnight    Vital signs per unit routine    Notify physician    Notify physician    Up with assistance    Place intermittent pneumatic compression device    Telemetry Monitoring    Full Code    Pharmacy to Dose: Vancomycin    Inpatient consult to Primary Care Provider    Inpatient consult to Cardiology    POC Glucose Fingerstick    EKG 12 Lead    EKG 12 Lead    Insert peripheral IV    PATIENT STATUS (FROM ED OR OR/PROCEDURAL) Observation       MEDICATIONS ORDERED:  Orders Placed This Encounter   Medications    DISCONTD: vancomycin (VANCOCIN) 1,750 mg in dextrose 5 % 500 mL IVPB    cefTRIAXone (ROCEPHIN) 1 g IVPB in 50 mL D5W minibag    insulin glulisine (APIDRA) injection pen 15 Units    aspirin chewable tablet 81 mg    calcium elemental (OSCAL) tablet 1,250 mg    clopidogrel (PLAVIX) tablet 75 mg    furosemide (LASIX) tablet 40 mg    glipiZIDE (GLUCOTROL) tablet 10 mg    levETIRAcetam (KEPPRA) tablet 1,000 mg    loperamide (IMODIUM) capsule 2 mg    metoprolol tartrate (LOPRESSOR) tablet 12.5 mg    atorvastatin (LIPITOR) tablet 10 mg    spironolactone (ALDACTONE) tablet 25 mg    Insulin Degludec SOPN 70 Units    sodium chloride flush 0.9 % injection 10 mL    sodium chloride flush 0.9 % injection 10 mL    acetaminophen (TYLENOL) tablet 650 mg    enoxaparin (LOVENOX) injection 30 mg    melatonin ER tablet 1 mg    FOLLOWED BY Linked Order Group     vancomycin (VANCOCIN) 2,500 mg in dextrose 5 % 500 mL IVPB     vancomycin (VANCOCIN) 1,750 mg in dextrose 5 % 500 mL IVPB    vancomycin (VANCOCIN) intermittent dosing (placeholder)       DDX: Felicia Ruiz is a 79 y.o. female who presents to the emergency department with acute on chronic bilateral lower extremity edema with left greater than right erythema.  Differential diagnosis includes cellulitis, weeping edema, CHF exacerbation, worsening kidney disease, sepsis    DIAGNOSTIC RESULTS / EMERGENCY DEPARTMENT COURSE / MDM   LAB RESULTS:  Results for orders placed or performed during the hospital encounter of 08/11/20   CBC Auto Differential   Result Value Ref Range    WBC 10.0 3.5 - 11.0 k/uL    RBC 4.03 4.0 - 5.2 m/uL    Hemoglobin 13.0 12.0 - 16.0 g/dL    Hematocrit 39.5 36 - 46 %    MCV 98.1 80 - 100 fL    MCH 32.3 26 - 34 pg    MCHC 33.0 31 - 37 g/dL    RDW 14.0 11.5 - 14.9 %    Platelets 476 067 - 323 k/uL    MPV 8.4 6.0 - 12.0 fL    NRBC Automated NOT REPORTED per 100 WBC    Differential Type NOT REPORTED     Seg Neutrophils 63 36 - 66 %    Lymphocytes 24 24 - 44 %    Monocytes 9 (H) 1 - 7 %    Eosinophils % 3 0 - 4 %    Basophils 1 0 - 2 %    Immature Granulocytes NOT REPORTED 0 %    Segs Absolute 6.20 1.3 - 9.1 k/uL    Absolute Lymph # 2.40 1.0 - 4.8 k/uL    Absolute Mono # 0.90 0.1 - 1.3 k/uL    Absolute Eos # 0.30 0.0 - 0.4 k/uL    Basophils Absolute 0.00 0.0 - 0.2 k/uL    Absolute Immature Granulocyte NOT REPORTED 0.00 - 0.30 k/uL    WBC Morphology NOT REPORTED     RBC Morphology NOT REPORTED     Platelet Estimate NOT REPORTED    Comprehensive Metabolic Panel w/ Reflex to MG   Result Value Ref Range    Glucose 198 (H) 70 - 99 mg/dL    BUN 33 (H) 8 - 23 mg/dL    CREATININE 1.15 (H) 0.50 - 0.90 mg/dL    Bun/Cre Ratio NOT REPORTED 9 - 20    Calcium 9.6 8.6 - 10.4 mg/dL    Sodium 138 135 - 144 mmol/L    Potassium 4.6 3.7 - 5.3 mmol/L    Chloride 100 98 - 107 mmol/L    CO2 27 20 - 31 mmol/L    Anion Gap 11 9 - 17 mmol/L    Alkaline Phosphatase 90 35 - 104 U/L    ALT 16 5 - 33 U/L    AST 17 <32 U/L    Total Bilirubin 0.26 (L) 0.3 - 1.2 mg/dL    Total Protein 7.7 6.4 - 8.3 g/dL    Alb 3.8 3.5 - 5.2 g/dL    Albumin/Globulin Ratio NOT REPORTED 1.0 - 2.5    GFR Non- 45 (L) >60 mL/min    GFR  55 (L) >60 mL/min    GFR Comment          GFR Staging NOT REPORTED    Urinalysis, reflex to microscopic   Result Value Ref Range    Color, UA YELLOW YELLOW    Turbidity UA CLEAR CLEAR    Glucose, Ur NEGATIVE NEGATIVE    Bilirubin Urine NEGATIVE NEGATIVE    Ketones, Urine NEGATIVE NEGATIVE    Specific Gravity, UA 1.006 1.000 - 1.030    Urine Hgb NEGATIVE NEGATIVE    pH, UA 7.0 5.0 - 8.0    Protein, UA NEGATIVE NEGATIVE    Urobilinogen, Urine Normal Normal    Nitrite, Urine NEGATIVE NEGATIVE    Leukocyte Esterase, Urine SMALL (A) NEGATIVE    Urinalysis Comments NOT REPORTED    Brain Natriuretic Peptide   Result Value Ref Range    Pro- <300 pg/mL    BNP Interpretation Pro-BNP Reference Range:    Troponin   Result Value Ref Range    Troponin, High Sensitivity 24 (H) 0 - 14 ng/L    Troponin T NOT REPORTED <0.03 ng/mL    Troponin Interp NOT REPORTED    Troponin   Result Value Ref Range    Troponin, High Sensitivity 25 (H) 0 - 14 ng/L    Troponin T NOT REPORTED <0.03 ng/mL    Troponin Interp NOT REPORTED    Microscopic Urinalysis   Result Value Ref Range    -          WBC, UA 10 TO 20 /HPF    RBC, UA 0 TO 2 /HPF    Casts UA NOT REPORTED /LPF    Crystals, UA NOT REPORTED None /HPF    Epithelial Cells UA 2 TO 5 /HPF    Renal Epithelial, UA NOT REPORTED 0 /HPF    Bacteria, UA FEW (A) None    Mucus, UA NOT REPORTED None    Trichomonas, UA NOT REPORTED None    Amorphous, UA NOT REPORTED None    Other Observations UA NOT REPORTED NOT REQ. Yeast, UA NOT REPORTED None   POC Glucose Fingerstick   Result Value Ref Range    POC Glucose 162 (H) 65 - 105 mg/dL   EKG 12 Lead   Result Value Ref Range    Ventricular Rate 88 BPM    Atrial Rate 88 BPM    P-R Interval 176 ms    QRS Duration 160 ms    Q-T Interval 426 ms    QTc Calculation (Bazett) 515 ms    P Axis 57 degrees    R Axis -82 degrees    T Axis 23 degrees       IMPRESSION: Felicia Ruiz is a 79 y.o. female who presents to the emergency department with chronic bilateral lower extremity edema with left greater than right erythema. On examination her vital signs are normal and examination demonstrates an otherwise well-appearing but obese woman of stated age with bilateral lower extremity pitting edema and left greater than right erythema. No focal signs to suggest thrombophlebitis or tracking infection of the leg. Erythematous changes most consistent with acute cellulitis. Plan for infectious labs but based on history of recent worsening leg swelling we will also check troponin and BNP. Patient has made it very clear that she does not wish to be admitted to the hospital and multiple times throughout the interview requests to go home. Will evaluate initially with lab work. Disposition and discussion pending labs. RADIOLOGY:  No results found. EKG  EKG Interpretation    Interpreted by emergency department physician    Rhythm: normal sinus   Rate: 88  Axis: left  Ectopy: none  Conduction: right bundle branch block (complete), AZ interval 176, , QTc 515  ST Segments: no acute change  T Waves: no acute change  Q Waves: nonspecific    Clinical Impression: non-specific EKG and no changes from EKG in 2016    Joey Ruiz MD      All EKG's are interpreted by the Emergency Department Physician who either signs or co-signs this chart in the absence of a cardiologist.    EMERGENCY DEPARTMENT COURSE:  ED Course as of Aug 12 0101   Tue Aug 11, 2020   1811 Unchanged from baseline   BUN(!): 33 [TS]   1811 Unchanged from baseline   Creatinine(!): 1.15 [TS]   1904 Troponin demonstrates mild elevation, will obtain EKG and serial troponin. Advised patient of recommendation to be admitted for cardiology consultation in the morning but patient emphatically and repeatedly states that she wishes to go home. She verbalizes understanding of the risk factors including heart attack or death at home and elects to follow-up as an outpatient. [TS]   2016 Patient agreeable to being admitted after son spoke with her, plan for 1 dose of IV antibiotics, pharmacy to dose Vanco, primary care provider consult. [TS]      ED Course User Index  [TS] Polina Samuel MD       PROCEDURES:  None    CONSULTS:  PHARMACY TO DOSE VANCOMYCIN  IP CONSULT TO PRIMARY CARE PROVIDER  IP CONSULT TO CARDIOLOGY    CRITICAL CARE:  Please see attending note. FINAL IMPRESSION      1. Cellulitis of left lower extremity    2. Bilateral lower extremity edema          DISPOSITION / PLAN     DISPOSITION        PATIENT REFERRED TO:  Leisa Astudillo MD  [address] [facility]  [phone]            DISCHARGE MEDICATIONS:  Current Discharge Medication List          Polina Samuel MD  Emergency Medicine Resident    This patient was evaluated in the Emergency Department for symptoms described in the history of present illness. He/she was evaluated in the context of the global COVID-19 pandemic, which necessitated consideration that the patient might be at risk for infection with the SARS-CoV-2 virus that causes COVID-19. Institutional protocols and algorithms that pertain to the evaluation of patients at risk for COVID-19 are in a state of rapid change based on information released by regulatory bodies including the CDC and federal and state organizations.  These policies and algorithms were followed during the patient's care in the ED.     (Please note that portions of thisnote were completed with a voice recognition program.  Efforts were made to edit the dictations but occasionally words are mis-transcribed.)        Polina Samuel MD  Resident  08/12/20 [id number]

## 2020-08-12 VITALS
BODY MASS INDEX: 43.49 KG/M2 | HEIGHT: 62 IN | SYSTOLIC BLOOD PRESSURE: 108 MMHG | WEIGHT: 236.33 LBS | RESPIRATION RATE: 16 BRPM | TEMPERATURE: 98 F | OXYGEN SATURATION: 96 % | DIASTOLIC BLOOD PRESSURE: 60 MMHG | HEART RATE: 90 BPM

## 2020-08-12 LAB
EKG ATRIAL RATE: 69 BPM
EKG ATRIAL RATE: 88 BPM
EKG P AXIS: 57 DEGREES
EKG P AXIS: 69 DEGREES
EKG P-R INTERVAL: 170 MS
EKG P-R INTERVAL: 176 MS
EKG Q-T INTERVAL: 426 MS
EKG Q-T INTERVAL: 442 MS
EKG QRS DURATION: 160 MS
EKG QRS DURATION: 164 MS
EKG QTC CALCULATION (BAZETT): 473 MS
EKG QTC CALCULATION (BAZETT): 515 MS
EKG R AXIS: -74 DEGREES
EKG R AXIS: -82 DEGREES
EKG T AXIS: 23 DEGREES
EKG T AXIS: 37 DEGREES
EKG VENTRICULAR RATE: 69 BPM
EKG VENTRICULAR RATE: 88 BPM
GLUCOSE BLD-MCNC: 162 MG/DL (ref 65–105)
GLUCOSE BLD-MCNC: 235 MG/DL (ref 65–105)

## 2020-08-12 PROCEDURE — 96372 THER/PROPH/DIAG INJ SC/IM: CPT

## 2020-08-12 PROCEDURE — G0378 HOSPITAL OBSERVATION PER HR: HCPCS

## 2020-08-12 PROCEDURE — 93005 ELECTROCARDIOGRAM TRACING: CPT | Performed by: EMERGENCY MEDICINE

## 2020-08-12 PROCEDURE — 93010 ELECTROCARDIOGRAM REPORT: CPT | Performed by: INTERNAL MEDICINE

## 2020-08-12 PROCEDURE — 6370000000 HC RX 637 (ALT 250 FOR IP): Performed by: EMERGENCY MEDICINE

## 2020-08-12 PROCEDURE — 2580000003 HC RX 258: Performed by: EMERGENCY MEDICINE

## 2020-08-12 PROCEDURE — 82947 ASSAY GLUCOSE BLOOD QUANT: CPT

## 2020-08-12 PROCEDURE — 6360000002 HC RX W HCPCS: Performed by: EMERGENCY MEDICINE

## 2020-08-12 RX ORDER — DOXYCYCLINE HYCLATE 100 MG
100 TABLET ORAL 2 TIMES DAILY
Qty: 20 TABLET | Refills: 0 | Status: SHIPPED | OUTPATIENT
Start: 2020-08-12 | End: 2020-08-22

## 2020-08-12 RX ADMIN — GLIPIZIDE 10 MG: 5 TABLET ORAL at 09:24

## 2020-08-12 RX ADMIN — ATORVASTATIN CALCIUM 10 MG: 10 TABLET, FILM COATED ORAL at 09:25

## 2020-08-12 RX ADMIN — Medication 10 ML: at 09:29

## 2020-08-12 RX ADMIN — ENOXAPARIN SODIUM 30 MG: 30 INJECTION SUBCUTANEOUS at 09:25

## 2020-08-12 RX ADMIN — CALCIUM 1250 MG: 500 TABLET ORAL at 11:45

## 2020-08-12 RX ADMIN — LEVETIRACETAM 1000 MG: 500 TABLET ORAL at 09:25

## 2020-08-12 RX ADMIN — SPIRONOLACTONE 25 MG: 25 TABLET, FILM COATED ORAL at 09:25

## 2020-08-12 RX ADMIN — CLOPIDOGREL BISULFATE 75 MG: 75 TABLET ORAL at 09:24

## 2020-08-12 RX ADMIN — FUROSEMIDE 40 MG: 40 TABLET ORAL at 09:25

## 2020-08-12 RX ADMIN — METOPROLOL TARTRATE 12.5 MG: 25 TABLET, FILM COATED ORAL at 09:24

## 2020-08-12 RX ADMIN — ASPIRIN 81 MG: 81 TABLET, CHEWABLE ORAL at 09:25

## 2020-08-12 ASSESSMENT — PAIN SCALES - GENERAL: PAINLEVEL_OUTOF10: 0

## 2020-08-12 NOTE — H&P
History and Physical    Patient:  Mey Waters  MRN: [id number]    CHIEF COMPLAINT:  Cellulitis and elevated trop    History Obtained From:  patient, electronic medical record, ER resident  PCP: Diane Birch MD    HISTORY OF PRESENT ILLNESS:   The patient is a 79 y.o. female who presents with increased redness and swelling of her legs. Pt had a new substitute caregiver that started yesterday and she was concerned about her legs being swollen and red and weeping. She called to get an appt and was transferred to nurse triage and then took pt to the ER. ER then noted pt was SOB and checked a troponin which was slightly elevated. Pt was admitted due to elevated trop. Pt is feeling fine. Does not really know what her legs look like normally as she cannot see them very well due to her weight and also has poor memory. Has been having no increased SOB or CP and no other symptoms.       Past Medical History:        Diagnosis Date    Acute upper respiratory infection     Arthritis 6/23/2017    Backache     Bell's palsy     Benign hypertension with CKD (chronic kidney disease) stage III ([facility] 7/15/2020    Cellulitis     CKD (chronic kidney disease) stage 3, GFR 30-59 ml/min ([facility]) 6/15/2020    Cough     Diabetes mellitus ([address]     Diarrhea 12/8/2015    H/O acute bronchitis with bronchospasm     H/O acute sinusitis     H/O allergic reaction     H/O allergic rhinitis     H/O tinea cruris     Hx of heart artery stent     heart stents x 4    Hyperlipidemia     Joint pain, hip     MI (myocardial infarction) ([address]     Morbid obesity ([facility])     Otitis externa     [facility]oid bleed ([facility] march 28, 2016    Tendonitis, bicipital        Past Surgical History:        Procedure Laterality Date    CARDIAC CATHETERIZATION      heart stents x 4    CATARACT REMOVAL WITH IMPLANT Left 11/01/2018    Raflinda/[facility]    CATARACT REMOVAL WITH IMPLANT Right 03/05/2019    Raffojennifer/[facility]io    INTRACAPSULAR CATARACT EXTRACTION Right 3/5/2019    EYE CATARACT EMULSIFICATION IOL IMPLANT performed by Thomas Jackson MD at [facility] Left     ME XCAPSL CTRC RMVL INSJ IO LENS PROSTH W/O ECP Left 11/1/2018    EYE CATARACT EMULSIFICATION IOL IMPLANT performed by Thomas Jackson MD at [address] [facility]  12/9/15       Medications Prior to Admission:    Prior to Admission medications    Medication Sig Start Date End Date Taking? Authorizing Provider   simvastatin (ZOCOR) 20 MG tablet TAKE 1 TABLET NIGHTLY 6/29/20   Elie Wills MD   APIDRA SOLOSTAR 100 UNIT/ML injection pen INJECT 15 UNITS SUBCUTANEOUSLY FOUR TIMES A DAY (BEFORE MEALS AND NIGHTLY) 6/22/20   Elie Wills MD   sodium chloride (ALTAMIST SPRAY) 0.65 % nasal spray 1 spray by Nasal route 2 times daily  Patient not taking: Reported on 7/6/2020 5/6/20   Elie Wills MD   spironolactone (ALDACTONE) 25 MG tablet TAKE 1 TABLET DAILY 3/23/20   Elie Wills MD   TRESIBA FLEXTOUCH 100 UNIT/ML SOPN Inject 70 Units into the skin nightly Patient is taking 60 units nightly 2/20/20   Elie Wills MD   levETIRAcetam (KEPPRA) 1000 MG tablet TAKE 1 TABLET TWICE A DAY 2/13/20   Elie Wills MD   Clobetasol Propionate 0.05 % LIQD Once daily for not more than 2 weeks at a time.  10/2/19   Issa Gutierrez PA-C   acetaminophen (TYLENOL) 325 MG tablet Take 2 tablets by mouth every 6 hours as needed for Pain  Patient not taking: Reported on 7/6/2020 7/22/19   Zainab Benitez MD   Ginger, Zingiber officinalis, (GINGER PO) Take by mouth    Historical Provider, MD   Multiple Vitamins-Minerals (MULTIVITAMIN WOMEN PO) Take by mouth    Historical Provider, MD   albuterol sulfate HFA (PROVENTIL HFA) 108 (90 Base) MCG/ACT inhaler Inhale 2 puffs into the lungs every 6 hours as needed for Shortness of Breath (cough) 3/23/18   Elie Wills MD   calcium carbonate (OYSTER SHELL CALCIUM 500 MG) 1250 (500 Ca) MG tablet TAKE 1 TABLET BY MOUTH ONE TIME A DAY  1/22/18   Kan Boone MD   triamcinolone (KENALOG) 0.025 % ointment Apply 1 each topically 2 times daily as needed 12/7/17   Historical Provider, MD   Glucose Blood (BLOOD GLUCOSE TEST STRIPS) STRP 1 strip by Does not apply route 4 times daily Indications: Mariam strips 8/21/17   Kan Boone MD   furosemide (LASIX) 40 MG tablet Take 40 mg by mouth daily Prescribed by Dr. Luis E Perry Provider, MD   Misc Natural Products (APPLE CIDER VINEGAR) TABS Take by mouth    Historical Provider, MD   vitamin D (ERGOCALCIFEROL) 46242 UNITS CAPS capsule TAKE 1 CAPSULE BY MOUTH ONE TIME A WEEK  3/7/17   Kan Boone MD   clopidogrel (PLAVIX) 75 MG tablet Take 1 tablet by mouth daily 2/13/17   Kan Boone MD   metoprolol tartrate (LOPRESSOR) 25 MG tablet Take 0.5 tablets by mouth 2 times daily Indications: per Cardiologist's order 2/13/17   Kan Boone MD   loperamide (IMODIUM) 2 MG capsule Take 1 capsule by mouth as needed for Diarrhea  Patient not taking: Reported on 7/6/2020 1/9/17   Kan Boone MD   Insulin Syringe-Needle U-100 (INSULIN SYRINGE .5CC/31GX5/16\") 31G X 5/16\" 0.5 ML MISC 1 each by Does not apply route daily 11/4/16   Kan Boone MD   glipiZIDE (GLUCOTROL) 10 MG tablet Take 1 tablet by mouth every morning (before breakfast) 8/4/16   Kan Boone MD   aspirin 81 MG chewable tablet Take 1 tablet by mouth daily  Patient taking differently: Take 81 mg by mouth 2 times daily  6/26/16   Kiera Wetzel MD       Allergies:  Lantus [insulin glargine]; Metformin and related; Prednisone; Sertraline; Amoxicillin; Eggs or egg-derived products; Novolog [insulin aspart]; and Zinc    Social History:   TOBACCO:   reports that she has never smoked. She has never used smokeless tobacco.  ETOH:   reports no history of alcohol use.   OCCUPATION:      Family History:       Problem Relation Age of Onset    Diabetes Father     Hypertension Father     Cancer Father         colon cancer    Diabetes Mother     Heart Disease Mother     Diabetes Brother     Heart Disease Brother        REVIEW OF SYSTEMS:  Negative except for as above. States SOB is dependent on what she does but is really not that different from normal.    Physical Exam:    Vitals: /60   Pulse 90   Temp 98 °F (36.7 °C) (Oral)   Resp 16   Ht 5' 2\" (1.575 m)   Wt 236 lb 5.3 oz (107.2 kg)   SpO2 96%   BMI 43.23 kg/m²   General appearance: alert, appears stated age and cooperative  Skin: Skin color, texture, turgor normal. No rashes or lesions  HEENT: Head: Normocephalic, no lesions, without obvious abnormality. Neck: supple, symmetrical, trachea midline  Lungs: clear to auscultation bilaterally  Heart: regular rate and rhythm, S1, S2 normal, no murmur, click, rub or gallop  Abdomen: soft, non-tender; bowel sounds normal; no masses,  no organomegaly  Extremities: edema bilaterally- about her normal- maybe slightly worse, eryth in left leg due to chronic edema, eryth in right shin due to cellulitis. Some mild seeping of clear fluid and minimal peeling. Neurologic: Mental status: Alert, oriented, thought content appropriate,    CBC:   Recent Labs     08/11/20  1730   WBC 10.0   HGB 13.0        BMP:    Recent Labs     08/11/20  1730      K 4.6      CO2 27   BUN 33*   CREATININE 1.15*   GLUCOSE 198*     Hepatic:   Recent Labs     08/11/20  1730   AST 17   ALT 16   BILITOT 0.26*   ALKPHOS 90     Troponin: No results for input(s): TROPONINI in the last 72 hours. BNP: No results for input(s): BNP in the last 72 hours. Lipids: No results for input(s): CHOL, HDL in the last 72 hours.     Invalid input(s): LDLCALCU  ABGs: No results found for: PHART, PO2ART, OOK0REA  INR: No results for input(s): INR in the last 72 hours.  -----------------------------------------------------------------  PA/lat CXR: see results  EKG: see results- no changes from previous    Assessment and Plan   1. Cellulitis- got vancomycin x1 in ER along with Ceftriaxone- will change to po abx and discharge to home  2. Mildly elevated troponin with no increased SOB or CP or EKG changes, and h/o renal disease and current infection- d/c to home  3. DM- home meds not available and pt has allergy to most insulins. Patient Active Problem List   Diagnosis Code    [facility]orrh[facility]vascular accident ([facility]) [facility] I61.9    Mobility impaired Z74.09    Coronary artery disease due to calcified coronary lesion I25.10, I25.84    Stroke, hemorrhagic ([facility] I61.9    Diabetic autonomic neuropathy (HCC) E11.43    Hyperlipidemia E78.5    Insomnia G47.00    SAH (subarachnoid hemorrhage) ([facility] I60.9    Fall from other slipping, tripping, or stumbling W01. 0XXA    Post-traumatic seizures ([facility] R56.1    Traumatic brain injury ([facility] S06. 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 116    Localized edema R60.0    CKD (chronic kidney disease) stage 3, GFR 30-59 ml/min ([facility]) N18.3    Benign hypertension with CKD (chronic kidney disease) stage III ([facility]) I12.9, N18.3    Arthritis M19.90    Chest pain of uncertain etiology P92.21    Dry cough R05    Easy bruising R23.8    Edema of both legs R60.0    Hyperglycemia due to type 2 diabetes mellitus ([facility] E11.65    Hyperkalemia E80.6    Old myocardial infarction I25.2    Presence of drug coated stent in right coronary artery Z95.5    Right bundle-branch block I45.10    Bell's palsy G51.0    Cellulitis L03.90       Electronically signed by Eulogio Gagnon MD on 8/12/2020 at 11:03 AM

## 2020-08-12 NOTE — DISCHARGE INSTR - COC
Continuity of Care Form    Patient Name: Laura Harris   :  1940  MRN:  [id number]    Admit date:  2020  Discharge date:  2020    Code Status Order: Full Code   Advance Directives:   Advance Care Flowsheet Documentation     Date/Time Healthcare Directive Type of Healthcare Directive Copy in [address] Agent's Name Healthcare Agent's Phone Number    20 0937  No, patient does not have an advance directive for healthcare treatment -- -- -- -- --          Admitting Physician:  Belinda Floyd MD  PCP: Belinda Floyd MD    Discharging Nurse: Timmy Baltazar Unit/Room#: 2121/2121-01  Discharging Unit Phone Number: [phone]    Emergency Contact:   Extended Emergency Contact Information  Primary Emergency Contact: Felix Tomlin   [address] Phone: [phone]  Mobile Phone: [phone]  Relation: Child  Secondary Emergency Contact: Ang [address] Phone: [phone]  Mobile Phone: [phone]  Relation: Child    Past Surgical History:  Past Surgical History:   Procedure Laterality Date    CARDIAC CATHETERIZATION      heart stents x 4    CATARACT REMOVAL WITH IMPLANT [facility] 2018    [facility]    CATARACT REMOVAL WITH IMPLANT Right 2019    [facility]    INTRACAPSULAR CATARACT EXTRACTION Right 3/5/2019    EYE CATARACT EMULSIFICATION IOL IMPLANT performed by Concepción Wright MD at [address] [place] [address]     [place] XCAPSL CTRC RMVL INSJ IO LENS PROSTH W/O ECP Left 2018    EYE CATARACT EMULSIFICATION IOL IMPLANT performed by Concepción Wright MD at [address] [place] [address]  12/9/15       Immunization History:   Immunization History   Administered Date(s) Administered    Pneumococcal Conjugate 13-valent (Ycyrmxy52) 2017    Pneumococcal Polysaccharide (Qbjqhnhfq98) 2018    Tdap (Boostrix, Adacel) 2000 Active Problems:  Patient Active Problem List   Diagnosis Code    Hemorrhagic cerebrovascular accident (CVA) ([facility] I61.9    Mobility impaired Z74.09    Coronary artery disease due to calcified coronary lesion I25.10, I25.84    Stroke, hemorrhagic [facility] I61.9    Diabetic autonomic neuropathy ([facility] E11.43    Hyperlipidemia E78.5    Insomnia G47.00    SAH (subarachnoid hemorrhage) ([facility] I60.9    Fall from other slipping, tripping, or stumbling W01. 0XXA    Post-traumatic seizures ([facility] R56.1    Traumatic brain injury ([facility] S06. 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 116    Localized edema R60.0    CKD (chronic kidney disease) stage 3, GFR 30-59 ml/min ([facility]) N18.3    Benign hypertension with CKD (chronic kidney disease) stage III ([facility]) I12.9, N18.3    Arthritis M19.90    Chest pain of uncertain etiology M08.37    Dry cough R05    Easy bruising R23.8    Edema of both legs R60.0    Hyperglycemia due to type 2 diabetes mellitus ([facility] E11.65    Hyperkalemia E87.5    Old myocardial infarction I25.2    Presence of drug coated stent in right coronary artery Z95.5    Right bundle-branch block I45.10    Bell's palsy G51.0    Cellulitis L03.90       Isolation/Infection:   Isolation          No Isolation        Patient Infection Status     None to display          Nurse Assessment:  Last Vital Signs: /60   Pulse 90   Temp 98 °F (36.7 °C) (Oral)   Resp 16   Ht 5' 2\" (1.575 m)   Wt 236 lb 5.3 oz (107.2 kg)   SpO2 96%   BMI 43.23 kg/m²     Last documented pain score (0-10 scale): Pain Level: 0  Last Weight:   Wt Readings from Last 1 Encounters:   08/12/20 236 lb 5.3 oz (107.2 kg)     Mental Status:  oriented and alert    IV Access:  - None    Nursing Mobility/ADLs:  Walking   Independent  Transfer  Independent  Bathing  Independent  Dressing  Independent  Toileting  Independent  Feeding  Independent  Med Admin  Assisted  Med Delivery   whole    Wound Care Documentation and Therapy:  Incision 12/09/15 Groin Right (Active)   Number of days: 1708       Incision 11/01/18 Eye Left (Active)   Number of days: 650       Wound 08/11/20 Leg Left; Lower; Anterior Open area that is blanchable. Patient stated it was a blister that popped. (Active)   Wound Pressure Stage  2 08/12/20 0900   Dressing Status Clean;Dry; Intact 08/12/20 0900   Dressing/Treatment Open to air 08/12/20 0900   Wound Assessment Clean;Dry;Red 08/12/20 0900   Drainage Amount None 08/12/20 0900   Odor None 08/12/20 0900   Number of days: 0        Elimination:  Continence:   · Bowel: Yes  · Bladder: Yes  Urinary Catheter: None   Colostomy/Ileostomy/Ileal Conduit: No         Intake/Output Summary (Last 24 hours) at 8/12/2020 1127  Last data filed at 8/12/2020 0539  Gross per 24 hour   Intake 573 ml   Output 0 ml   Net 573 ml     I/O last 3 completed shifts: In: 573 [I.V.:573]  Out: 0     Safety Concerns: At Risk for Falls    Impairments/Disabilities:      None    Nutrition Therapy:  Current Nutrition Therapy:   - Oral Diet:  Carb Control 4 carbs/meal (1800kcals/day)    Routes of Feeding: Oral  Liquids: No Restrictions  Daily Fluid Restriction: no  Last Modified Barium Swallow with Video (Video Swallowing Test): not done    Treatments at the Time of Hospital Discharge:   Respiratory Treatments: SEE MAR  Oxygen Therapy:  is not on home oxygen therapy.   Ventilator:    - No ventilator support    Rehab Therapies: Physical Therapy and Occupational Therapy  Weight Bearing Status/Restrictions: No weight bearing restirctions  Other Medical Equipment (for information only, NOT a DME order):  walker  Other Treatments: skilled nursing assessment, medication education, and continue monitoring. Patient's personal belongings (please select all that are sent with patient):  None    RN SIGNATURE:  Electronically signed by Daphney Pope RN on 8/12/20 at 11:29 AM EDT    CASE MANAGEMENT/SOCIAL WORK SECTION    Inpatient Status Date: ***    Readmission Risk Assessment Score:  Readmission Risk              Risk of Unplanned Readmission:        0           Discharging to Facility/ Agency   · Name:   · Address:  · Phone:  · Fax:    Dialysis Facility (if applicable)   · Name:  · Address:  · Dialysis Schedule:  · Phone:  · Fax:    / signature: {Esignature:[id number]}    PHYSICIAN SECTION    Prognosis: {Prognosis:5413444562}    Condition at Discharge: Elsa Benjamin Patient Condition:[id number]}    Rehab Potential (if transferring to Rehab): {Prognosis:7872831942}    Recommended Labs or Other Treatments After Discharge: ***    Physician Certification: I certify the above information and transfer of Hernando Braun  is necessary for the continuing treatment of the diagnosis listed and that she requires {Admit to Appropriate Level of Care:30275} for {GREATER/LESS:172435401} 30 days.      Update Admission H&P: {CHP DME Changes in XQTKL:625826348}    PHYSICIAN SIGNATURE:  {Esignature:[id number]}

## 2020-08-12 NOTE — PROGRESS NOTES
Patient arrived to unit via stretcher. RN assessed patient and vitals were taken. No distress noted. Telemetry applied. Patient oriented to room. Call light within reach.

## 2020-08-12 NOTE — CARE COORDINATION
CASE MANAGEMENT NOTE:    Admission Date:  8/11/2020 Serena Morales is a 79 y.o.  female    Admitted for : Cellulitis [L03.90]    Met with:  Patient    PCP:  Dr. Ori Bales:  [address] [place] [address] Dr. Medicare      Current Residence/ Living Arrangements:  independently at home             Current Services PTA:  No    Is patient agreeable to VNS: No    Freedom of choice provided:  Yes    List of [address] provided: Yes    VNS chosen:  No    DME:  walker and shower chair, GB, walk-in shower, Ramp    Home Oxygen: No    Nebulizer: No    CPAP/BIPAP: No    Supplier: N/A    Potential Assistance Needed: Yes    SNF needed: No    Freedom of choice and list provided: NA    Pharmacy:  [facility]ariq Morales       Does Patient want to use MEDS to BEDS? No    Is the Patient an CHARO AGOSTO [facility] with Readmission Risk Score greater than 14%? NA  If yes, pt needs a follow up appointment made within 7 days. Family Members/Caregivers that pt would like involved in their care:    Yes    If yes, list name here:  Son Lulu Starr and daughter Hollace Fleischer:  Aide from [employer] and family             Is patient in Isolation/One on One/Altered Mental Status? No  If yes, skip next question. If no, would they like an I-Pad to  use? No  If yes, call [phone]. Discharge Plan:  8/12: Joanne Burger - From 1-story home alone with ramp. DME - walker, SC, GB. Current with HHA through [employer] - she does light house work and run errands with patient. Declines need for VNS at this time. On IV rocephin and IV vanco. Discharge home later today.  ORANGE HEADER - N/A. //PETE                 Electronically signed by: Alessandra Rojas RN on 8/12/2020 at 12:21 PM

## 2020-08-12 NOTE — PROGRESS NOTES
RN called and talked to son, he can  patient in an hour. Message sent to Dr. Lacey Finch regarding discharge.

## 2020-08-12 NOTE — PROGRESS NOTES
Call back from Dr. Ori Bryant, informed doctor of 2.2 pauses and tropoin levels, and NPO status.  N.O. for Carb diet

## 2020-08-12 NOTE — PROGRESS NOTES
Pharmacy Note  Vancomycin Consult    Belle Dent is a 79 y.o. female started on Vancomycin for cellulitis; consult received from Dr. Maritza May in ED to manage therapy. Also receiving the following antibiotics: ceftriaxone. Patient Active Problem List   Diagnosis    Uncontrolled diabetes mellitus with ESRD (end-stage renal disease) ([address]    Hemorrhagic cerebrovascular accident (CVA) ([facility]    Mobility impaired    Coronary artery disease due to calcified coronary lesion    Stroke, hemorrhagic ([address]    Diabetic autonomic neuropathy ([address]    Hyperlipidemia    Insomnia    SAH (subarachnoid hemorrhage) ([address]    Fall from other slipping, tripping, or stumbling    Post-traumatic seizures ([address]    Traumatic brain injury ([address]    Encephalopathy acute    Type 2 diabetes mellitus, uncontrolled ([address]    Chronic diastolic CHF (congestive heart failure) ([address]    Morbid obesity with BMI of 45.0-49.9, adult ([facility]    History of cerebellar hemorrhage    Ataxia    Syncope    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) ([address]    Seizure disorder ([address]    History of cerebral infarction    Vitamin D deficiency    Cellulitis of left lower extremity    Localized edema    CKD (chronic kidney disease) stage 3, GFR 30-59 ml/min ([facility])    Benign hypertension with CKD (chronic kidney disease) stage III ([facility])    Arthritis    Chest pain of uncertain etiology    Dry cough    Easy bruising    Edema of both legs    Hyperglycemia due to type 2 diabetes mellitus (HCC)    Hyperkalemia    Old myocardial infarction    Presence of drug coated stent in right coronary artery    Right bundle-branch block    Bell's palsy       Allergies:  Lantus [insulin glargine]; Metformin and related; Prednisone; Sertraline; Amoxicillin; Eggs or egg-derived products;  Novolog [insulin aspart]; and Zinc     Temp max: 98 F    Recent Labs     08/11/20  1730   BUN 33*       Recent Labs     08/11/20  1730   CREATININE 1.15*       Recent Labs     08/11/20  1730 WBC 10.0       No intake or output data in the 24 hours ending 08/11/20 2111    Culture Date      Source                       Results  pending    Ht Readings from Last 1 Encounters:   08/11/20 5' 2\" (1.575 m)        Wt Readings from Last 1 Encounters:   08/11/20 265 lb (120.2 kg)         Body mass index is 48.47 kg/m². Estimated Creatinine Clearance: 47 mL/min (A) (based on SCr of 1.15 mg/dL (H)). Goal Trough Level: 10-20 mcg/mL    Assessment/Plan:  Will initiate Vancomycin with a one time loading dose of 2500 mg x1, followed by 1750  mg IV every 24 hours. Timing of trough level will be determined based on culture results, renal function, and clinical response. Thank you for the consult. Will continue to follow. Rodríguez Bowers [facility].  [address] 8/11/2020 9:13 PM

## 2020-08-12 NOTE — CONSULTS
tablet by mouth 2 times daily for 10 days 8/12/20 8/22/20 Yes Cassie Perea MD   simvastatin (ZOCOR) 20 MG tablet TAKE 1 TABLET NIGHTLY 6/29/20   Cassie Perea MD   APIDRA SOLOSTAR 100 UNIT/ML injection pen INJECT 15 UNITS SUBCUTANEOUSLY FOUR TIMES A DAY (BEFORE MEALS AND NIGHTLY) 6/22/20   Cassie Perea MD   sodium chloride (ALTAMIST SPRAY) 0.65 % nasal spray 1 spray by Nasal route 2 times daily  Patient not taking: Reported on 7/6/2020 5/6/20   Cassie Perea MD   spironolactone (ALDACTONE) 25 MG tablet TAKE 1 TABLET DAILY 3/23/20   Cassie Perea MD   TRESIBA FLEXTOUCH 100 UNIT/ML SOPN Inject 70 Units into the skin nightly Patient is taking 60 units nightly 2/20/20   Cassie Perea MD   levETIRAcetam (KEPPRA) 1000 MG tablet TAKE 1 TABLET TWICE A DAY 2/13/20   Cassie Perea MD   Clobetasol Propionate 0.05 % LIQD Once daily for not more than 2 weeks at a time.  10/2/19   Aisha Dominguez PA-C   acetaminophen (TYLENOL) 325 MG tablet Take 2 tablets by mouth every 6 hours as needed for Pain  Patient not taking: Reported on 7/6/2020 7/22/19   Wendy Ritchie MD   Ginger, Zingiber officinalis, (GINGER PO) Take by mouth    Historical Provider, MD   Multiple Vitamins-Minerals (MULTIVITAMIN WOMEN PO) Take by mouth    Historical Provider, MD   albuterol sulfate HFA (PROVENTIL HFA) 108 (90 Base) MCG/ACT inhaler Inhale 2 puffs into the lungs every 6 hours as needed for Shortness of Breath (cough) 3/23/18   Cassie Perea MD   calcium carbonate (OYSTER SHELL CALCIUM 500 MG) 1250 (500 Ca) MG tablet TAKE 1 TABLET BY MOUTH ONE TIME A DAY  1/22/18   Cassie Perea MD   triamcinolone (KENALOG) 0.025 % ointment Apply 1 each topically 2 times daily as needed 12/7/17   Historical Provider, MD   Glucose Blood (BLOOD GLUCOSE TEST STRIPS) STRP 1 strip by Does not apply route 4 times daily Indications: Mariam strips 8/21/17   Cassie Perea MD furosemide (LASIX) 40 MG tablet Take 40 mg by mouth daily Prescribed by Dr. Darek Leung Provider, MD   Misc Natural Products (APPLE CIDER VINEGAR) TABS Take by mouth    Historical Provider, MD   vitamin D (ERGOCALCIFEROL) 33034 UNITS CAPS capsule TAKE 1 CAPSULE BY MOUTH ONE TIME A WEEK  3/7/17   Letty Valencia MD   clopidogrel (PLAVIX) 75 MG tablet Take 1 tablet by mouth daily 2/13/17   Letty Valencia MD   metoprolol tartrate (LOPRESSOR) 25 MG tablet Take 0.5 tablets by mouth 2 times daily Indications: per Cardiologist's order 2/13/17   Letty Valencia MD   loperamide (IMODIUM) 2 MG capsule Take 1 capsule by mouth as needed for Diarrhea  Patient not taking: Reported on 7/6/2020 1/9/17   Letty Valencia MD   Insulin Syringe-Needle U-100 (INSULIN SYRINGE .5CC/31GX5/16\") 31G X 5/16\" 0.5 ML MISC 1 each by Does not apply route daily 11/4/16   Letty Valencia MD   glipiZIDE (GLUCOTROL) 10 MG tablet Take 1 tablet by mouth every morning (before breakfast) 8/4/16   Letty Valencia MD   aspirin 81 MG chewable tablet Take 1 tablet by mouth daily  Patient taking differently: Take 81 mg by mouth 2 times daily  6/26/16   Lacey Pham MD       Allergies:  Lantus [insulin glargine]; Metformin and related; Prednisone; Sertraline; Amoxicillin; Eggs or egg-derived products; Novolog [insulin aspart]; and Zinc    Social History:   reports that she has never smoked. She has never used smokeless tobacco. She reports that she does not drink alcohol or use drugs. Family History:   Positive for early CAD    REVIEW OF SYSTEMS:    · Constitutional: there has been no unanticipated weight loss. There's been No change in energy level, No change in activity level. · Eyes: No visual changes or diplopia. No scleral icterus. · ENT: No Headaches, hearing loss or vertigo. No mouth sores or sore throat.   · Cardiovascular: No problem  · Respiratory: No previous reported problems  · Gastrointestinal: No abdominal pain, appetite loss, blood in stools. No change in bowel or bladder habits. · Genitourinary: No dysuria, trouble voiding, or hematuria. · Musculoskeletal:  No gait disturbance, No weakness or joint complaints. · Integumentary: No rash or pruritis. · Neurological: No headache, diplopia, change in muscle strength, numbness or tingling. No change in gait, balance, coordination, mood, affect, memory, mentation, behavior. · Psychiatric: No anxiety, or depression. · Endocrine: No temperature intolerance. No excessive thirst, fluid intake, or urination. No tremor. · Hematologic/Lymphatic: No abnormal bruising or bleeding, blood clots or swollen lymph nodes. · Allergic/Immunologic: No nasal congestion or hives. PHYSICAL EXAM:    Physical Examination:    /60   Pulse 90   Temp 98 °F (36.7 °C) (Oral)   Resp 16   Ht 5' 2\" (1.575 m)   Wt 236 lb 5.3 oz (107.2 kg)   SpO2 96%   BMI 43.23 kg/m²    Constitutional and General Appearance: alert, cooperative, no distress and appears stated age  HEENT: PERRL, no cervical lymphadenopathy. No masses palpable. Normal oral mucosa  Respiratory:  · Normal excursion and expansion without use of accessory muscles  · Resp Auscultation: Good respiratory effort. No for increased work of breathing. On auscultation: clear to auscultation bilaterally  Cardiovascular:  · The apical impulse is not displaced  · Heart tones are crisp and normal. regular S1 and S2. Murmurs:  None  · Jugular venous pulsation Normal  · The carotid upstroke is normal in amplitude and contour without delay or bruit  · Peripheral pulses are symmetrical and full   Abdomen:  · No masses or tenderness  · Bowel sounds present  Extremities:  ·  No Cyanosis or Clubbing  ·  Lower extremity edema: Yes; LLE cellulitis  ·  Skin: Warm and dry  Neurological:  · Alert and oriented.   · Moves all extremities well  · No abnormalities of mood, affect, memory, mentation, or behavior are noted    DATA:    Diagnostics:      EKG:  Sinus rhythm, 69 bpm, chronic RBBB and left axis deviation with q waves V1-V3    2D ECHO ( 6/25/16)  Technically very difficult study due to body habitus and patient  cooperation. No cardiac thrombo-embolic source seen  Mild left ventricular hypertrophy with normal systolic function. No significant valvular abnormalities. No pericardial effusion. Global left ventricular systolic function is normal.  Estimated ejection fraction is 50-55%. Abnormal E/A ratio on transmitral doppler consistent with abnormal left  ventricular relaxation. Labs:     CBC:   Recent Labs     08/11/20  1730   WBC 10.0   HGB 13.0   HCT 39.5        BMP:   Recent Labs     08/11/20  1730      K 4.6   CO2 27   BUN 33*   CREATININE 1.15*   LABGLOM 45*   GLUCOSE 198*     BNP: No results for input(s): BNP in the last 72 hours. PT/INR: No results for input(s): PROTIME, INR in the last 72 hours. APTT:No results for input(s): APTT in the last 72 hours. CARDIAC ENZYMES:No results for input(s): CKTOTAL, CKMB, CKMBINDEX, TROPONINI in the last 72 hours. FASTING LIPID PANEL:  Lab Results   Component Value Date    HDL 41 03/11/2019    LDLCALC 88 03/11/2019    TRIG 137 03/11/2019     LIVER PROFILE:  Recent Labs     08/11/20  1730   AST 17   ALT 16   LABALBU 3.8         IMPRESSION:    1. Marginally elevated high-sensitivity troponins, stable, with no evidence of acute coronary syndrome. 2. Preserved LVEF; no evidence of acute CHF  3. Transient nocturnal sinus bradycardia and pauses up to 2.5 seconds, asymptomatic and c/w vagal effects. 4. Chronic LE edema and cellulitis  5. Essential HTN with diastolic dysfunction  6. Type II DM  7.  Chronic obesity    Patient Active Problem List   Diagnosis    Hemorrh[facility]vascular [facility] ([facility]) [facility] [address]    Mobility impaired    Coronary artery disease due to calcified coronary lesion    Stroke, hemorrhagic ([address]    Diabetic autonomic neuropathy ([address]    Hyperlipidemia    Insomnia    SAH (subarachnoid hemorrhage) ([address]    Fall from other slipping, tripping, or stumbling    Post-traumatic seizures ([address]    Traumatic brain injury ([address]    Encephalopathy acute    Type 2 diabetes mellitus, uncontrolled (HCC)    Chronic diastolic CHF (congestive heart failure) ([facility]    Morbid obesity with BMI of 45.0-49.9, adult ([facility])    History of cerebellar hemorrhage    Ataxia    Syncope    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) ([facility] [address]    Seizure disorder ([address]    History of cerebral infarction    Vitamin D deficiency    Cellulitis of left lower extremity    Localized edema    CKD (chronic kidney disease) stage 3, GFR 30-59 ml/min ([facility])    Benign hypertension with CKD (chronic kidney disease) stage III ([facility])    Arthritis    Chest pain of uncertain etiology    Dry cough    Easy bruising    Edema of both legs    Hyperglycemia due to type 2 diabetes mellitus (HCC)    Hyperkalemia    Old myocardial infarction    Presence of drug coated stent in right coronary artery    Right bundle-branch block    Bell's palsy    Cellulitis       RECOMMENDATIONS:  1. Acceptable for discharge today from a cardiac standpoint  2. Continue Lasix, spironolactone and metoprolol. 3. Continue ASA, Plavix and Lipitor    Discussed with patient and nursing.     Electronically signed by Layla Zepeda MD on 8/12/2020 at 12:33 PM.  [place] cardiology Consultant

## 2020-08-12 NOTE — DISCHARGE SUMMARY
Discharge Summary    Laura Harris  :  1940  MRN:  [id number]    Admit date:  2020  Discharge date:      Admitting Physician:  Belinda Floyd MD    PCP: Belinda Floyd MD    Discharge Diagnoses:    Patient Active Problem List   Diagnosis    [facility])    Mobility impaired    Coronary artery disease due to calcified coronary lesion    Stroke, hemorrhagic ([address]    Diabetic autonomic neuropathy ([address]    Hyperlipidemia    Insomnia    SAH (subarachnoid hemorrhage) ([address]    Fall from other slipping, tripping, or stumbling    Post-traumatic seizures ([address]    Traumatic brain injury ([address]    Encephalopathy acute    Type 2 diabetes mellitus, uncontrolled ([address]    Chronic diastolic CHF (congestive heart failure) ([address]    Morbid obesity with BMI of 45.0-49.9, adult ([address]    History of cerebellar hemorrhage    Ataxia    Syncope    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) ([address]    Seizure disorder ([address]    History of cerebral infarction    Vitamin D deficiency    Cellulitis of left lower extremity    Localized edema    CKD (chronic kidney disease) stage 3, GFR 30-59 ml/min ([facility])    Benign hypertension with CKD (chronic kidney disease) stage III ([facility])    Arthritis    Chest pain of uncertain etiology    Dry cough    Easy bruising    Edema of both legs    Hyperglycemia due to type 2 diabetes mellitus ([address]    Hyperkalemia    Old myocardial infarction    Presence of drug coated stent in right coronary artery    Right bundle-branch block    Bell's palsy    Cellulitis       Discharged Condition:  stable    Hospital Course:   Patient admitted for cellulitis and slightly elevated troponin. Pt had no symptoms of cardiac issues and cellulitis was treated with rocephin and vanco x1 dose each. Pt was discharged to home the next day with po doxycycline.  Troponin elevated due to slightly elevated renal function (chronic) CIDER VINEGAR) TABS  Take by mouth             Multiple Vitamins-Minerals (MULTIVITAMIN WOMEN PO)  Take by mouth             simvastatin (ZOCOR) 20 MG tablet  TAKE 1 TABLET NIGHTLY             sodium chloride (ALTAMIST SPRAY) 0.65 % nasal spray  1 spray by Nasal route 2 times daily             spironolactone (ALDACTONE) 25 MG tablet  TAKE 1 TABLET DAILY             TRESIBA FLEXTOUCH 100 UNIT/ML SOPN  Inject 70 Units into the skin nightly Patient is taking 60 units nightly             triamcinolone (KENALOG) 0.025 % ointment  Apply 1 each topically 2 times daily as needed             vitamin D (ERGOCALCIFEROL) 01374 UNITS CAPS capsule  TAKE 1 CAPSULE BY MOUTH ONE TIME A WEEK                  Significant Diagnostic Studies:    CBC with Differential:    Lab Results   Component Value Date    WBC 10.0 08/11/2020    RBC 4.03 08/11/2020    HGB 13.0 08/11/2020    HCT 39.5 08/11/2020     08/11/2020    MCV 98.1 08/11/2020    MCH 32.3 08/11/2020    MCHC 33.0 08/11/2020    RDW 14.0 08/11/2020    LYMPHOPCT 24 08/11/2020    MONOPCT 9 08/11/2020    BASOPCT 1 08/11/2020    MONOSABS 0.90 08/11/2020    LYMPHSABS 2.40 08/11/2020    EOSABS 0.30 08/11/2020    BASOSABS 0.00 08/11/2020    DIFFTYPE NOT REPORTED 08/11/2020     Radiology Review: Other diagnostic test:      Disposition:   home  Follow up with Shaun Porter MD in 1 weeks.   Discharge patient to: Home    Electronically signed by Shaun Porter MD on 8/12/2020 at 11:20 AM

## 2020-08-12 NOTE — PLAN OF CARE
Problem: Falls - Risk of:  Goal: Will remain free from falls  Description: Will remain free from falls  Outcome: Ongoing  Note: Patient remained free from falls this shift. Bed locked and in lowest position. Call light within reach. Bed alarm in use, and hourly rounding performed. Patient requested all 4 guard rails up during sleep.       Problem: Falls - Risk of:  Goal: Absence of physical injury  Description: Absence of physical injury  Outcome: Ongoing     Problem: Skin Integrity:  Goal: Will show no infection signs and symptoms  Description: Will show no infection signs and symptoms  Outcome: Ongoing     Problem: Skin Integrity:  Goal: Absence of new skin breakdown  Description: Absence of new skin breakdown  Outcome: Ongoing     Problem: Pain:  Goal: Patient's pain/discomfort is manageable  Description: Patient's pain/discomfort is manageable  Outcome: Ongoing     Problem: Safety:  Goal: Free from accidental physical injury  Description: Free from accidental physical injury  Outcome: Ongoing     Problem: Safety:  Goal: Free from intentional harm  Description: Free from intentional harm  Outcome: Ongoing

## 2020-08-13 ENCOUNTER — OFFICE VISIT (OUTPATIENT)
Dept: PRIMARY CARE CLINIC | Age: 80
End: 2020-08-13
Payer: MEDICARE

## 2020-08-13 VITALS
OXYGEN SATURATION: 96 % | HEART RATE: 62 BPM | SYSTOLIC BLOOD PRESSURE: 102 MMHG | TEMPERATURE: 98.8 F | DIASTOLIC BLOOD PRESSURE: 62 MMHG | WEIGHT: 236.8 LBS | BODY MASS INDEX: 43.31 KG/M2

## 2020-08-13 LAB
CULTURE: NORMAL
Lab: NORMAL
SPECIMEN DESCRIPTION: NORMAL

## 2020-08-13 PROCEDURE — 1111F DSCHRG MED/CURRENT MED MERGE: CPT | Performed by: FAMILY MEDICINE

## 2020-08-13 PROCEDURE — 99213 OFFICE O/P EST LOW 20 MIN: CPT | Performed by: FAMILY MEDICINE

## 2020-08-13 NOTE — PROGRESS NOTES
Post-Discharge Transitional Care Management Services or Hospital Follow Up      Brooke Ornelas   YOB: 1940    Date of Office Visit:  8/13/2020  Date of Hospital Admission: 8/11/20  Date of Hospital Discharge: 8/12/20  Risk of hospital readmission (high >=14%.  Medium >=10%) :No data recorded    Care management risk score Rising risk (score 2-5) and Complex Care (Scores >=6): 4     Non face to face  following discharge, date last encounter closed (first attempt may have been earlier): *No documented post hospital discharge outreach found in the last 14 days    Call initiated 2 business days of discharge: *No response recorded in the last 14 days    Patient Active Problem List   Diagnosis    Hemorrhagic cerebrovascular accident (CVA) ([address]    Mobility impaired    Coronary artery disease due to calcified coronary lesion    Stroke, hemorrhagic ([address]    Diabetic autonomic neuropathy ([address]    Hyperlipidemia    Insomnia    SAH (subarachnoid hemorrhage) ([address]    Fall from other slipping, tripping, or stumbling    Post-traumatic seizures ([address]    Traumatic brain injury ([address]    Encephalopathy acute    Type 2 diabetes mellitus, uncontrolled ([address]    Chronic diastolic CHF (congestive heart failure) ([address]    Morbid obesity with BMI of 45.0-49.9, adult ([address]    History of cerebellar hemorrhage    Ataxia    Syncope    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) ([address]    Seizure disorder ([address]    History of cerebral infarction    Vitamin D deficiency    Cellulitis of left lower extremity    Localized edema    CKD (chronic kidney disease) stage 3, GFR 30-59 ml/min (HCC)    Benign hypertension with CKD (chronic kidney disease) stage III (HCC)    Arthritis    Chest pain of uncertain etiology    Dry cough    Easy bruising    Edema of both legs    Hyperglycemia due to type 2 diabetes mellitus ([address]    Hyperkalemia    Old myocardial infarction    Presence of drug coated strips             Insulin Syringe-Needle U-100 (INSULIN SYRINGE .5CC/31GX5/16\") 31G X 5/16\" 0.5 ML MISC  1 each by Does not apply route daily             levETIRAcetam (KEPPRA) 1000 MG tablet  TAKE 1 TABLET TWICE A DAY             loperamide (IMODIUM) 2 MG capsule  Take 1 capsule by mouth as needed for Diarrhea             metoprolol tartrate (LOPRESSOR) 25 MG tablet  Take 0.5 tablets by mouth 2 times daily Indications: per Cardiologist's order             Misc Natural Products (APPLE CIDER VINEGAR) TABS  Take by mouth             Multiple Vitamins-Minerals (MULTIVITAMIN WOMEN PO)  Take by mouth             simvastatin (ZOCOR) 20 MG tablet  TAKE 1 TABLET NIGHTLY             sodium chloride (ALTAMIST SPRAY) 0.65 % nasal spray  1 spray by Nasal route 2 times daily             spironolactone (ALDACTONE) 25 MG tablet  TAKE 1 TABLET DAILY             TRESIBA FLEXTOUCH 100 UNIT/ML SOPN  Inject 70 Units into the skin nightly Patient is taking 60 units nightly             triamcinolone (KENALOG) 0.025 % ointment  Apply 1 each topically 2 times daily as needed             vitamin D (ERGOCALCIFEROL) 88668 UNITS CAPS capsule  TAKE 1 CAPSULE BY MOUTH ONE TIME A WEEK                    Medications marked \"taking\" at this time  Outpatient Medications Marked as Taking for the 8/13/20 encounter (Office Visit) with Belinda Floyd MD   Medication Sig Dispense Refill    doxycycline hyclate (VIBRA-TABS) 100 MG tablet Take 1 tablet by mouth 2 times daily for 10 days 20 tablet 0    simvastatin (ZOCOR) 20 MG tablet TAKE 1 TABLET NIGHTLY 90 tablet 3    APIDRA SOLOSTAR 100 UNIT/ML injection pen INJECT 15 UNITS SUBCUTANEOUSLY FOUR TIMES A DAY (BEFORE MEALS AND NIGHTLY) 15 mL 3    sodium chloride (ALTAMIST SPRAY) 0.65 % nasal spray 1 spray by Nasal route 2 times daily 1 Bottle 3    spironolactone (ALDACTONE) 25 MG tablet TAKE 1 TABLET DAILY 90 tablet 3    TRESIBA FLEXTOUCH 100 UNIT/ML SOPN Inject 70 Units into the skin nightly Patient is taking 60 units nightly 5 pen 0    levETIRAcetam (KEPPRA) 1000 MG tablet TAKE 1 TABLET TWICE A  tablet 3    Clobetasol Propionate 0.05 % LIQD Once daily for not more than 2 weeks at a time.  1 Bottle 0    Ginger, Zingiber officinalis, (GINGER PO) Take by mouth      Multiple Vitamins-Minerals (MULTIVITAMIN WOMEN PO) Take by mouth      albuterol sulfate HFA (PROVENTIL HFA) 108 (90 Base) MCG/ACT inhaler Inhale 2 puffs into the lungs every 6 hours as needed for Shortness of Breath (cough) 1 Inhaler 3    calcium carbonate (OYSTER SHELL CALCIUM 500 MG) 1250 (500 Ca) MG tablet TAKE 1 TABLET BY MOUTH ONE TIME A DAY  30 tablet 1    triamcinolone (KENALOG) 0.025 % ointment Apply 1 each topically 2 times daily as needed  1    Glucose Blood (BLOOD GLUCOSE TEST STRIPS) STRP 1 strip by Does not apply route 4 times daily Indications: Mariam strips 200 strip 3    furosemide (LASIX) 40 MG tablet Take 40 mg by mouth daily Prescribed by Dr. Helen Linder (APPLE CIDER VINEGAR) TABS Take by mouth      vitamin D (ERGOCALCIFEROL) 81691 UNITS CAPS capsule TAKE 1 CAPSULE BY MOUTH ONE TIME A WEEK  4 capsule 2    clopidogrel (PLAVIX) 75 MG tablet Take 1 tablet by mouth daily 90 tablet 3    metoprolol tartrate (LOPRESSOR) 25 MG tablet Take 0.5 tablets by mouth 2 times daily Indications: per Cardiologist's order 90 tablet 3    loperamide (IMODIUM) 2 MG capsule Take 1 capsule by mouth as needed for Diarrhea 30 capsule 1    Insulin Syringe-Needle U-100 (INSULIN SYRINGE .5CC/31GX5/16\") 31G X 5/16\" 0.5 ML MISC 1 each by Does not apply route daily 100 each 3    glipiZIDE (GLUCOTROL) 10 MG tablet Take 1 tablet by mouth every morning (before breakfast) 90 tablet 3    aspirin 81 MG chewable tablet Take 1 tablet by mouth daily (Patient taking differently: Take 81 mg by mouth 2 times daily ) 30 tablet 3        Medications patient taking as of now reconciled against medications ordered at time of

## 2020-08-18 LAB
CULTURE: NORMAL
CULTURE: NORMAL
Lab: NORMAL
Lab: NORMAL
SPECIMEN DESCRIPTION: NORMAL
SPECIMEN DESCRIPTION: NORMAL

## 2020-08-21 ENCOUNTER — TELEPHONE (OUTPATIENT)
Dept: PRIMARY CARE CLINIC | Age: 80
End: 2020-08-21

## 2020-08-21 NOTE — TELEPHONE ENCOUNTER
Jamir Hayes from [employer] calling to let you know that she is pt's new care giver.  [facility].  [phone]

## 2020-08-31 ENCOUNTER — OFFICE VISIT (OUTPATIENT)
Dept: PRIMARY CARE CLINIC | Age: 80
End: 2020-08-31
Payer: MEDICARE

## 2020-08-31 VITALS
TEMPERATURE: 97.3 F | SYSTOLIC BLOOD PRESSURE: 115 MMHG | DIASTOLIC BLOOD PRESSURE: 70 MMHG | HEART RATE: 74 BPM | OXYGEN SATURATION: 97 % | WEIGHT: 266.8 LBS | BODY MASS INDEX: 48.8 KG/M2

## 2020-08-31 PROBLEM — L03.116 CELLULITIS OF LEFT LOWER EXTREMITY: Status: RESOLVED | Noted: 2019-07-26 | Resolved: 2020-08-31

## 2020-08-31 PROBLEM — L03.90 CELLULITIS: Status: RESOLVED | Noted: 2020-08-11 | Resolved: 2020-08-31

## 2020-08-31 PROCEDURE — 99213 OFFICE O/P EST LOW 20 MIN: CPT | Performed by: FAMILY MEDICINE

## 2020-08-31 ASSESSMENT — ENCOUNTER SYMPTOMS
RHINORRHEA: 0
DIARRHEA: 0
ABDOMINAL PAIN: 0
EYE DISCHARGE: 0
VOMITING: 0
COLOR CHANGE: 1
SHORTNESS OF BREATH: 0
NAUSEA: 0
EYE REDNESS: 0
SORE THROAT: 0
WHEEZING: 0
COUGH: 0

## 2020-08-31 ASSESSMENT — PATIENT HEALTH QUESTIONNAIRE - PHQ9
SUM OF ALL RESPONSES TO PHQ QUESTIONS 1-9: 0
1. LITTLE INTEREST OR PLEASURE IN DOING THINGS: 0
SUM OF ALL RESPONSES TO PHQ QUESTIONS 1-9: 0
SUM OF ALL RESPONSES TO PHQ9 QUESTIONS 1 & 2: 0
2. FEELING DOWN, DEPRESSED OR HOPELESS: 0

## 2020-08-31 NOTE — PROGRESS NOTES
[facility] PRIMARY CARE  [address] [facility] [address]  Dept: [address] is a 79 y.o. female who presents today for her medical conditions/complaintsas noted below. Chief Complaint   Patient presents with    Cellulitis     2 week cellulitis f/u. HPI:     HPI- She is here for a follow up for cellulitis that she got two weeks ago. Her caretaker says that when she got out of the hospital her left leg looked \"bad\" and then started to get better until two days ago. The redness has started to increase and it is \"blistery\". She denies having fever, SOB, chest pain or leg pain.      LDL Cholesterol (mg/dL)   Date Value   06/24/2016 72   12/01/2015 68     LDL Calculated (mg/dL)   Date Value   03/11/2019 88   07/06/2017 76       (goal LDL is <100)   AST (U/L)   Date Value   08/11/2020 17     ALT (U/L)   Date Value   08/11/2020 16     BUN (mg/dL)   Date Value   08/11/2020 33 (H)     BP Readings from Last 3 Encounters:   08/31/20 115/70   08/13/20 102/62   08/12/20 108/60          (goal 120/80)    Past Medical History:   Diagnosis Date    Acute upper respiratory infection     Arthritis 6/23/2017    Backache     Bell's palsy     Benign hypertension with CKD (chronic kidney disease) stage III ([facility]) 7/15/2020    Cellulitis     Cellulitis 8/11/2020    Cellulitis of left lower extremity 7/26/2019    CKD (chronic kidney disease) stage 3, GFR 30-59 ml/min ([facility]) 6/15/2020    Cough     Diabetes mellitus ([address]     Diarrhea 12/8/2015    H/O acute bronchitis with bronchospasm     H/O acute sinusitis     H/O allergic reaction     H/O allergic rhinitis     H/O tinea cruris     Hx of heart artery stent     heart stents x 4    Hyperlipidemia     Joint pain, hip     MI (myocardial infarction) ([address]     Morbid obesity ([address]     Otitis externa     Subarachnoid bleed ([address] march 28, 2016    Tendonitis, bicipital       Past Surgical History:   Procedure Laterality Date    CARDIAC CATHETERIZATION      heart stents x 4    CATARACT REM[facility] IMPLANT [facility]01[facility]2018    [facility]    CATARACT REMOVAL WITH IMPLANT Right 03/05/2019    [facility]    INTRACAPSULAR CATARACT EXTRACTION Right 3/5/2019    EYE CATARACT EMULSIFICATION IOL IMPLANT performed by Isi Orr MD at [address] [facility] Left     PA XCAPSL CTRC RMVL INSJ IO LENS PROSTH W/O ECP Left 11/1/2018    EYE CATARACT EMULSIFICATION IOL IMPLANT performed by Isi Orr MD at [address] [place] [address]  12/9/15       Family History   Problem Relation Age of Onset    Diabetes Father     Hypertension Father     Cancer Father         colon cancer    Diabetes Mother     Heart Disease Mother     Diabetes Brother     Heart Disease Brother        Social History     Tobacco Use    Smoking status: Never Smoker    Smokeless tobacco: Never Used   Substance Use Topics    Alcohol use: No     Alcohol/week: 0.0 standard drinks     Comment: one glass of wine per year      Current Outpatient Medications   Medication Sig Dispense Refill    simvastatin (ZOCOR) 20 MG tablet TAKE 1 TABLET NIGHTLY 90 tablet 3    APIDRA SOLOSTAR 100 UNIT/ML injection pen INJECT 15 UNITS SUBCUTANEOUSLY FOUR TIMES A DAY (BEFORE MEALS AND NIGHTLY) 15 mL 3    sodium chloride (ALTAMIST SPRAY) 0.65 % nasal spray 1 spray by Nasal route 2 times daily 1 Bottle 3    spironolactone (ALDACTONE) 25 MG tablet TAKE 1 TABLET DAILY 90 tablet 3    TRESIBA FLEXTOUCH 100 UNIT/ML SOPN Inject 70 Units into the skin nightly Patient is taking 60 units nightly 5 pen 0    levETIRAcetam (KEPPRA) 1000 MG tablet TAKE 1 TABLET TWICE A  tablet 3    Clobetasol Propionate 0.05 % LIQD Once daily for not more than 2 weeks at a time.  1 Bottle 0    acetaminophen (TYLENOL) 325 MG tablet Take 2 tablets by mouth every 6 hours as needed for Pain 40 tablet 0    Ginger, Zingiber officinalis, (GINGER PO) Take by mouth      Multiple Vitamins-Minerals (MULTIVITAMIN WOMEN PO) Take by mouth      albuterol sulfate HFA (PROVENTIL HFA) 108 (90 Base) MCG/ACT inhaler Inhale 2 puffs into the lungs every 6 hours as needed for Shortness of Breath (cough) 1 Inhaler 3    calcium carbonate (OYSTER SHELL CALCIUM 500 MG) 1250 (500 Ca) MG tablet TAKE 1 TABLET BY MOUTH ONE TIME A DAY  30 tablet 1    triamcinolone (KENALOG) 0.025 % ointment Apply 1 each topically 2 times daily as needed  1    Glucose Blood (BLOOD GLUCOSE TEST STRIPS) STRP 1 strip by Does not apply route 4 times daily Indications: Mariam strips 200 strip 3    furosemide (LASIX) 40 MG tablet Take 40 mg by mouth daily Prescribed by Dr. Micheal Sinclair (APPLE CIDER VINEGAR) TABS Take by mouth      vitamin D (ERGOCALCIFEROL) 88552 UNITS CAPS capsule TAKE 1 CAPSULE BY MOUTH ONE TIME A WEEK  4 capsule 2    clopidogrel (PLAVIX) 75 MG tablet Take 1 tablet by mouth daily 90 tablet 3    metoprolol tartrate (LOPRESSOR) 25 MG tablet Take 0.5 tablets by mouth 2 times daily Indications: per Cardiologist's order 90 tablet 3    Insulin Syringe-Needle U-100 (INSULIN SYRINGE .5CC/31GX5/16\") 31G X 5/16\" 0.5 ML MISC 1 each by Does not apply route daily 100 each 3    glipiZIDE (GLUCOTROL) 10 MG tablet Take 1 tablet by mouth every morning (before breakfast) 90 tablet 3    aspirin 81 MG chewable tablet Take 1 tablet by mouth daily (Patient taking differently: Take 81 mg by mouth 2 times daily ) 30 tablet 3    loperamide (IMODIUM) 2 MG capsule Take 1 capsule by mouth as needed for Diarrhea 30 capsule 1     No current facility-administered medications for this visit.       Allergies   Allergen Reactions    Lantus [Insulin Glargine]      Hives,itching    Metformin And Related Diarrhea    Prednisone Swelling     Facial swelling    Sertraline Other (See Comments)     hallucination    Amoxicillin Diarrhea and Nausea And Vomiting    thyromegaly. Trachea: No tracheal deviation. Cardiovascular:      Rate and Rhythm: Normal rate and regular rhythm. Heart sounds: Normal heart sounds. Pulmonary:      Effort: Pulmonary effort is normal. No respiratory distress. Breath sounds: Normal breath sounds. No wheezing. Lymphadenopathy:      Cervical: No cervical adenopathy. Skin:     General: Skin is warm. Findings: Erythema and rash (Anterior aspect of the left tibia is erythematous, edematous and scaly. Not acutely warm and no signs of spreading infection. ) present. Neurological:      Mental Status: She is alert and oriented to person, place, and time. Psychiatric:         Mood and Affect: Mood normal.         Behavior: Behavior normal.         Thought Content: Thought content normal.         Assessment:       Diagnosis Orders   1. Localized edema     2. Edema of both legs          Plan:      Return in about 3 months (around 11/30/2020) for medication f/u, HTN f/u. No orders of the defined types were placed in this encounter. No orders of the defined types were placed in this encounter. Patient given educationalmaterials - see patient instructions. Discussed use, benefit, and side effectsof prescribed medications. All patient questions answered. Pt voiced understanding. Reviewed health maintenance. Instructed to continue current medications, diet andexercise. Patient agreed with treatment plan. Follow up as directed.      Electronicallysigned by Pako Avitia MD on 8/31/2020 at 12:19 PM

## 2020-09-16 ENCOUNTER — TELEPHONE (OUTPATIENT)
Dept: PRIMARY CARE CLINIC | Age: 80
End: 2020-09-16

## 2020-09-22 NOTE — TELEPHONE ENCOUNTER
Pt daughter Son Donate calling and is asking if they can have a referral to Wound care at [facility] no preference on which doctor. She states that it is infected and has let this go too long. Please created referral if approved. no

## 2020-09-23 NOTE — TELEPHONE ENCOUNTER
They can go but once again, usually wound care won't do anything unless there is a chronic open wound that is not healing

## 2020-09-28 ENCOUNTER — TELEPHONE (OUTPATIENT)
Dept: PRIMARY CARE CLINIC | Age: 80
End: 2020-09-28

## 2020-09-28 NOTE — TELEPHONE ENCOUNTER
Adv them that I told the pt that and they still insisted that I send a referral.  I thought maybe if they told the pt that they would listen. Please call pt and have them just f/u with me so we can talk about the options if they will do that.

## 2020-09-28 NOTE — TELEPHONE ENCOUNTER
Called Mehreen Wagner and notified her of this. Asked her to call the pt/family and advise them. She states that she will walk the information over to [facility] next door.

## 2020-10-01 PROBLEM — I27.20 MILD PULMONARY HYPERTENSION (HCC): Status: ACTIVE | Noted: 2020-07-13

## 2020-10-01 PROBLEM — I51.7 LVH (LEFT VENTRICULAR HYPERTROPHY): Status: ACTIVE | Noted: 2020-07-13

## 2020-10-01 PROBLEM — I34.0 MODERATE MITRAL REGURGITATION: Status: ACTIVE | Noted: 2020-07-13

## 2020-11-09 ENCOUNTER — HOSPITAL ENCOUNTER (OUTPATIENT)
Age: 80
Setting detail: SPECIMEN
Discharge: HOME | End: 2020-11-09
Payer: MEDICARE

## 2020-11-09 ENCOUNTER — OFFICE VISIT (OUTPATIENT)
Dept: PRIMARY CARE CLINIC | Age: 80
End: 2020-11-09
Payer: MEDICARE

## 2020-11-09 VITALS — OXYGEN SATURATION: 96 % | HEART RATE: 89 BPM | TEMPERATURE: 97.3 F

## 2020-11-09 PROCEDURE — 99213 OFFICE O/P EST LOW 20 MIN: CPT | Performed by: NURSE PRACTITIONER

## 2020-11-09 ASSESSMENT — PATIENT HEALTH QUESTIONNAIRE - PHQ9
SUM OF ALL RESPONSES TO PHQ QUESTIONS 1-9: 0
1. LITTLE INTEREST OR PLEASURE IN DOING THINGS: 0
SUM OF ALL RESPONSES TO PHQ9 QUESTIONS 1 & 2: 0
2. FEELING DOWN, DEPRESSED OR HOPELESS: 0
SUM OF ALL RESPONSES TO PHQ QUESTIONS 1-9: 0
SUM OF ALL RESPONSES TO PHQ QUESTIONS 1-9: 0

## 2020-11-09 NOTE — PROGRESS NOTES
[address]  [address] [facility] [address] [address]  Dept: [phone]  Dept Fax: [phone]    Get Richter a 79 y.o. female who presents to the urgent care today for her medical conditions/complaintsas noted below. Felix Jara is c/o of Concern For COVID-19 (Cough, congestion x 1 week )      HPI:     Pt tells me she has a cough for only a few days  States better than was  Denies fever  Here for covid test  Denies vomiting, diarrhea, cp, sob    Cough   This is a new problem. The current episode started in the past 7 days. The problem has been waxing and waning. The cough is non-productive. Pertinent negatives include no chest pain, chills, fever, shortness of breath or wheezing. Nothing aggravates the symptoms. Treatments tried: otc.        Past Medical History:   Diagnosis Date    Acute upper respiratory infection     Arthritis 6/23/2017    Backache     Bell's palsy     Benign hypertension with CKD (chronic kidney disease) stage III 7/15/2020    Cellulitis     Cellulitis 8/11/2020    Cellulitis of left lower extremity 7/26/2019    CKD (chronic kidney disease) stage 3, GFR 30-59 ml/min 6/15/2020    Cough     Diabetes mellitus ([address]     Diarrhea 12/8/2015    H/O acute bronchitis with bronchospasm     H/O acute sinusitis     H/O allergic reaction     H/O allergic rhinitis     H/O tinea cruris     Hx of heart artery stent     heart stents x 4    Hyperlipidemia     Joint pain, hip     MI (myocardial infarction) ([address]     Morbid obesity (HCC)     Otitis externa     Subarachnoid bleed ([address] march 28, 2016    Tendonitis, bicipital        Current Outpatient Medications   Medication Sig Dispense Refill    BD PEN NEEDLE KRISTEN U/F 32G X 4 MM MISC       simvastatin (ZOCOR) 20 MG tablet TAKE 1 TABLET NIGHTLY 90 tablet 3    APIDRA SOLOSTAR 100 UNIT/ML injection pen INJECT 15 UNITS SUBCUTANEOUSLY FOUR TIMES A DAY (BEFORE MEALS AND NIGHTLY) 15 mL 3    sodium chloride (ALTAMIST SPRAY) 0.65 % nasal spray 1 spray by Nasal route 2 times daily 1 Bottle 3    spironolactone (ALDACTONE) 25 MG tablet TAKE 1 TABLET DAILY 90 tablet 3    TRESIBA FLEXTOUCH 100 UNIT/ML SOPN Inject 70 Units into the skin nightly Patient is taking 60 units nightly 5 pen 0    levETIRAcetam (KEPPRA) 1000 MG tablet TAKE 1 TABLET TWICE A  tablet 3    Clobetasol Propionate 0.05 % LIQD Once daily for not more than 2 weeks at a time.  1 Bottle 0    acetaminophen (TYLENOL) 325 MG tablet Take 2 tablets by mouth every 6 hours as needed for Pain 40 tablet 0    Ginger, Zingiber officinalis, (GINGER PO) Take by mouth      Multiple Vitamins-Minerals (MULTIVITAMIN WOMEN PO) Take by mouth      albuterol sulfate HFA (PROVENTIL HFA) 108 (90 Base) MCG/ACT inhaler Inhale 2 puffs into the lungs every 6 hours as needed for Shortness of Breath (cough) 1 Inhaler 3    calcium carbonate (OYSTER SHELL CALCIUM 500 MG) 1250 (500 Ca) MG tablet TAKE 1 TABLET BY MOUTH ONE TIME A DAY  30 tablet 1    triamcinolone (KENALOG) 0.025 % ointment Apply 1 each topically 2 times daily as needed  1    Glucose Blood (BLOOD GLUCOSE TEST STRIPS) STRP 1 strip by Does not apply route 4 times daily Indications: Mariam strips 200 strip 3    furosemide (LASIX) 40 MG tablet Take 40 mg by mouth daily Prescribed by Dr. Booth (APPLE CIDER VINEGAR) TABS Take by mouth      vitamin D (ERGOCALCIFEROL) 66378 UNITS CAPS capsule TAKE 1 CAPSULE BY MOUTH ONE TIME A WEEK  4 capsule 2    clopidogrel (PLAVIX) 75 MG tablet Take 1 tablet by mouth daily 90 tablet 3    metoprolol tartrate (LOPRESSOR) 25 MG tablet Take 0.5 tablets by mouth 2 times daily Indications: per Cardiologist's order 90 tablet 3    loperamide (IMODIUM) 2 MG capsule Take 1 capsule by mouth as needed for Diarrhea 30 capsule 1    Insulin Syringe-Needle U-100 (INSULIN SYRINGE .5CC/31GX5/16\") 31G X 5/16\" 0.5 ML MISC 1 each by Does not apply route daily 100 each 3    glipiZIDE (GLUCOTROL) 10 MG tablet Take 1 tablet by mouth every morning (before breakfast) 90 tablet 3    aspirin 81 MG chewable tablet Take 1 tablet by mouth daily (Patient taking differently: Take 81 mg by mouth 2 times daily ) 30 tablet 3     No current facility-administered medications for this visit. Allergies   Allergen Reactions    Lantus [Insulin Glargine]      Hives,itching    Metformin And Related Diarrhea    Prednisone Swelling     Facial swelling    Sertraline Other (See Comments)     hallucination    Amoxicillin Diarrhea and Nausea And Vomiting    Eggs Or Egg-Derived Products Rash    Novolog [Insulin Aspart] Itching and Rash    Zinc Itching and Rash       Subjective:     Review of Systems   Constitutional: Negative for chills and fever. Respiratory: Positive for cough. Negative for shortness of breath and wheezing. Cardiovascular: Negative for chest pain. All other systems reviewed and are negative. Objective:      Physical Exam  Vitals signs and nursing note reviewed. Constitutional:       General: She is not in acute distress. Appearance: Normal appearance. She is well-developed. She is not ill-appearing, toxic-appearing or diaphoretic. HENT:      Head: Normocephalic and atraumatic. Nose: Nose normal.      Mouth/Throat:      Mouth: Mucous membranes are moist.   Eyes:      General: No scleral icterus. Right eye: No discharge. Left eye: No discharge. Neck:      Musculoskeletal: Normal range of motion and neck supple. No neck rigidity. Cardiovascular:      Rate and Rhythm: Normal rate and regular rhythm. Heart sounds: Normal heart sounds. Pulmonary:      Effort: Pulmonary effort is normal. No respiratory distress. Breath sounds: Normal breath sounds. No stridor. No wheezing, rhonchi or rales. Abdominal:      General: Bowel sounds are normal.      Palpations: Abdomen is soft.       Tenderness: There is no abdominal tenderness. Musculoskeletal: Normal range of motion. General: No signs of injury. Skin:     General: Skin is warm and dry. Coloration: Skin is not jaundiced or pale. Findings: No erythema or rash. Neurological:      General: No focal deficit present. Mental Status: She is alert and oriented to person, place, and time. Psychiatric:         Mood and Affect: Mood normal.         Behavior: Behavior normal.       There were no vitals taken for this visit. Assessment:          Diagnosis Orders   1. Upper respiratory tract infection, unspecified type  COVID-19 Ambulatory       Plan: You were tested for COVID today. We will call you with results when they are available. If you have not heard from us in 7 days, please call our office. Patient was evaluated [place] today during this pandemic covid 19 situation. Quarantine as directed  Await results  Increase fluids- stay hydrated  Good handwashing  Supportive care as discussed    If having symptoms- you need to be fever free for 24 hours, other symptoms resolved and at least 10 days passed since first symptom appeared before discontinuing  quarantine- CDC guidelines    tylenol as directed as needed over the counter if able to take  go to the ER for chest pain, short of breath, hard time breathing, persistent vomiting, feeling weaker or dehydrated, any worsening, change or concern  Follow up with primary care for re evaluation  [place] covid 19 call center - [phone]-ASK- [address]  Another good resource for information is coronavirus. [place].gov    If exposure, it is recommended 14 day quarantine  Return for go to the ER for worsening, change or concern, follow up with primary care in 7 days. Patient given educational materials - see patientinstructions. Discussed use, benefit, and side effects of prescribed medications. All patient questions answered. Pt voiced understanding.     Electronically signed by Tanya Escobar FELICE De Guzman - Janis 11/9/2020 at 12:32 PM

## 2020-11-09 NOTE — PATIENT INSTRUCTIONS
You were tested for COVID today. We will call you with results when they are available. If you have not heard from us in 7 days, please call our office. Patient was evaluated [place] today during this pandemic covid 19 situation. Quarantine as directed  Await results  Increase fluids- stay hydrated  Good handwashing  Supportive care as discussed    If having symptoms- you need to be fever free for 24 hours, other symptoms resolved and at least 10 days passed since first symptom appeared before discontinuing  quarantine- CDC guidelines    tylenol as directed as needed over the counter if able to take  go to the ER for chest pain, short of breath, hard time breathing, persistent vomiting, feeling weaker or dehydrated, any worsening, change or concern  Follow up with primary care for re evaluation  [place] covid 19 call center - [phone]-ASK- [address]  Another good resource for information is coronavirus. [place].gov    If exposure, it is recommended 14 day quarantine

## 2020-11-10 ASSESSMENT — ENCOUNTER SYMPTOMS
SHORTNESS OF BREATH: 0
WHEEZING: 0
COUGH: 1

## 2020-11-12 LAB — SARS-COV-2, NAA: NOT DETECTED

## 2020-12-02 PROBLEM — E16.2 HYPOGLYCEMIA: Status: ACTIVE | Noted: 2020-01-01

## 2020-12-02 NOTE — ED PROVIDER NOTES
[facility]  eMERGENCY dEPARTMENT eNCOUnter      Pt Name: Arjun Cook  MRN: [id number]  [place] 1940  Date of evaluation: 12/2/2020  PCP:    Funmilayo Hager MD      CHIEF COMPLAINT     No chief complaint on file. HISTORY OF PRESENT ILLNESS    Arjun Cook is a 79 y.o. female who presents by EMS for fall and hypoglycemia. She pressed her medical alert button. She states she was awake this morning had breakfast and took her morning medications. She does not remember exact details of when she fell but woke up on the floor with left shoulder and right hip pain. She denies headache neck pain chest pain shortness of breath. No strokelike symptoms. No bleeding. No recent fevers or Covid type symptoms. No anticoagulation. She is type II diabetic. She is unable to get back up again. EMS found blood sugar 25, gave oral glucose because she was unable to access IV. Repeat blood sugar was 41 prior to arrival.      REVIEW OF SYSTEMS         Review of Systems   Constitutional: Negative for fatigue and fever. HENT: Negative for rhinorrhea and sore throat. Respiratory: Negative for cough, chest tightness and shortness of breath. Cardiovascular: Negative for chest pain. Gastrointestinal: Negative for constipation, diarrhea and nausea. Endocrine: Positive for polydipsia and polyuria. Genitourinary: Negative for difficulty urinating, dysuria, flank pain and frequency. Musculoskeletal: Negative for arthralgias, back pain, myalgias and neck pain. Neurological: Positive for dizziness and syncope. Negative for tremors, seizures, speech difficulty, weakness, numbness and headaches. Psychiatric/Behavioral: Negative. PAST MEDICAL HISTORY    has no past medical history on file. SURGICAL HISTORY      has no past surgical history on file.     CURRENT MEDICATIONS       Previous Medications    No medications on file       ALLERGIES     has no allergies on file.    FAMILY HISTORY     has no family status information on file. family history is not on file. SOCIAL HISTORY          PHYSICAL EXAM     INITIAL VITALS:  vitals were not taken for this visit. Physical Exam  Vitals signs and nursing note reviewed. Constitutional:       General: She is not in acute distress. Appearance: She is well-developed. She is obese. She is not toxic-appearing or diaphoretic. Comments: GCS 15  Alert and oriented  No acute distress    Superficial abrasions and bruises noted to the face  Left shoulder has painful range of movement, no deformity    Both hands have violaceous discoloration and delayed capillary refill   HENT:      Head: Normocephalic and atraumatic. Right Ear: Tympanic membrane and external ear normal.      Left Ear: Tympanic membrane and external ear normal.      Nose:      Comments: Dried blood from the right nostril no active epistaxis     Mouth/Throat:      Mouth: Mucous membranes are dry. Pharynx: Oropharynx is clear. No oropharyngeal exudate or posterior oropharyngeal erythema. Eyes:      General: No scleral icterus. Right eye: No discharge. Left eye: No discharge. Extraocular Movements: Extraocular movements intact. Pupils: Pupils are equal, round, and reactive to light. Neck:      Musculoskeletal: Normal range of motion. Trachea: No tracheal deviation. Cardiovascular:      Rate and Rhythm: Normal rate. Pulses: Normal pulses. Heart sounds: Normal heart sounds. No murmur. No gallop. Pulmonary:      Effort: Pulmonary effort is normal. No respiratory distress. Breath sounds: No stridor. Abdominal:      General: Abdomen is flat. There is no distension. Tenderness: There is no abdominal tenderness. There is no guarding or rebound. Musculoskeletal: Normal range of motion. General: Swelling present.       Comments: Significant bilateral leg edema, 2+ to knees  Venous stasis erythema and warmth in both lower extremities   Skin:     General: Skin is dry. Capillary Refill: Capillary refill takes 2 to 3 seconds. Findings: Rash present. Comments: Right digits are violaceous with delayed capillary refill  Pulses are present  She has monilial type rash in the intertriginous regions below both breasts and in the creases of the thighs and below her pannus   Neurological:      General: No focal deficit present. Mental Status: She is alert and oriented to person, place, and time. Cranial Nerves: No cranial nerve deficit. Sensory: No sensory deficit. Motor: No weakness. Coordination: Coordination normal.   Psychiatric:         Behavior: Behavior normal.           DIFFERENTIAL DIAGNOSIS/ MDM:     Fall, hypoglycemia, rule out fracture, unlikely stroke, rule out spine injury  Consider dehydration, UTI, metabolic disturbance, infection    DIAGNOSTIC RESULTS     EKG: All EKG's are interpreted by the Emergency Department Physician who either signs or Co-signs this chart in the absence of a cardiologist.    EKG Interpretation    Interpreted by me    Rhythm: normal sinus, sinus arrhythmia  Rate: normal  Axis: Left  Ectopy: none  Conduction: Right bundle branch block  ST Segments: no acute change  T Waves: no acute change  Q Waves: Septal, inferior    Clinical Impression: Right bundle branch block pattern          RADIOLOGY:   I directly visualized the following  images and reviewed the radiologist interpretations:  XR SHOULDER LEFT (MIN 2 VIEWS)   Preliminary Result   Limited due to suboptimal positioning. No evidence of acute osseous   abnormality. XR SHOULDER LEFT 1 VW   Final Result   Limited evaluation with only one view submitted. Inferior subluxation of the shoulder is nonspecific but could be related to   positioning versus a joint effusion.          CT CHEST ABDOMEN PELVIS W CONTRAST   Final Result   No evidence for acute traumatic injury to the chest, abdomen, or pelvis. No fracture or malalignment of the thoracolumbar spine. Multiple additional chronic findings as described above. CT THORACIC SPINE W CONTRAST   Final Result   No evidence for acute traumatic injury to the chest, abdomen, or pelvis. No fracture or malalignment of the thoracolumbar spine. Multiple additional chronic findings as described above. CT LUMBAR SPINE WO CONTRAST   Final Result   No evidence for acute traumatic injury to the chest, abdomen, or pelvis. No fracture or malalignment of the thoracolumbar spine. Multiple additional chronic findings as described above. CT HEAD WO CONTRAST   Final Result   No acute intracranial abnormality. CT CERVICAL SPINE WO CONTRAST   Final Result   Cervical spine degenerative changes. No acute traumatic findings. ED BEDSIDE ULTRASOUND:     LABS:  Labs Reviewed   TRAUMA PANEL - Abnormal; Notable for the following components:       Result Value    BUN 37 (*)     WBC 14.5 (*)     CREATININE 1.14 (*)     Glucose 32 (*)     Anion Gap 7 (*)     pH, Jean Claude 7.273 (*)     pCO2, Jean Claude 64.0 (*)     O2 Sat, Jean Claude 53.9 (*)     All other components within normal limits   MYOGLOBIN, SERUM - Abnormal; Notable for the following components:    Myoglobin 244 (*)     All other components within normal limits   POC GLUCOSE FINGERSTICK - Abnormal; Notable for the following components:    POC Glucose 25 (*)     All other components within normal limits   POC GLUCOSE FINGERSTICK - Abnormal; Notable for the following components:    POC Glucose 121 (*)     All other components within normal limits   POC GLUCOSE FINGERSTICK - Abnormal; Notable for the following components:    POC Glucose 55 (*)     All other components within normal limits   COVID-19   CK   URINE RT REFLEX TO CULTURE   TSH WITH REFLEX   TROPONIN   TROPONIN   TYPE AND SCREEN           EMERGENCY DEPARTMENT COURSE:   Vitals:   There were no vitals filed for this visit.  -------------------------   ,  ,  ,      Patient was seen and cleared by the trauma team.  Because of persistent drops in blood sugar, on sulfonylurea, will admit to medical service. CRITICAL CARE:   IP CONSULT TO HOSPITALIST      CONSULTS:  Trauma  [facility]    PROCEDURES:  None    FINAL IMPRESSION      1. Hypoglycemia    2. Fall from bed, initial encounter    3. Syncope and collapse          DISPOSITION/PLAN   DISPOSITION        PATIENT REFERRED TO:  No follow-up provider specified. DISCHARGE MEDICATIONS:  New Prescriptions    No medications on file       (Please note that portions of this note were completed with a voice recognition program.  Efforts were made to edit the dictations but occasionally words are mis-transcribed. )    Trevino MD, F.A.C.E.P. Attending Emergency Physician             [facility] [place] [facility]  Emergency Department  Faculty Attestation     I performed a history and physical examination of the patient and discussed management with the resident. I reviewed the residents note and agree with the documented findings and plan of care. Any areas of disagreement are noted on the chart. I was personally present for the key portions of any procedures. I have documented in the chart those procedures where I was not present during the key portions. I have reviewed the emergency nurses triage note. I agree with the chief complaint, past medical history, past surgical history, allergies, medications, social and family history as documented unless otherwise noted below. For Physician Assistant/ Nurse Practitioner cases/documentation I have personally evaluated this patient and have completed at least one if not all key elements of the E/M (history, physical exam, and MDM). Additional findings are as noted.       Primary Care Physician:  Ivone Herr MD    Attending Physician Additional  Notes    See above note            Derrell Alberts. Marne Dancer, MD, [facility]  Attending Emergency  Physician                Shellie Barnett MD  12/02/20 2003

## 2020-12-02 NOTE — ED NOTES
Spoke to pt's daughter in law and updated her which pt stated was okay. Pt's daughter in law concerned about home care for this pt because she did not show up today. Daughter in law phone number is [phone].       Carlos Szymanski RN  12/02/20 0798

## 2020-12-02 NOTE — FLOWSHEET NOTE
[facility]     Emergency/Trauma Note    PATIENT NAME: Chiquita Gr  Shift date: 12/02/2020  Shift day: Wednesday   Shift # 1    Room # TRAUMA A/TRAUMAA   Name: Chiquita Gr            Age: 79 y.o. Gender: female          Uatsdin: No Mormonism on file   Place of [employer]: Unknown    Trauma/Incident type: Adult Trauma Priority  Admit Date & Time: 12/2/2020 10:31 AM  TRAUMA NAME: Bi Trauma Daina    PATIENT/EVENT DESCRIPTION:  Bi Trauma Alfreda Finch is a 79 y.o. female who arrived ED via ground ambulance as adult trauma priority. Per report, patient took a fall. Patient was conscious and responsive. Patient to be admitted to TRAUMA A/TRAUMAA. SPIRITUAL ASSESSMENT/INTERVENTION:  No spiritual assessment was carried out because  did not speak to patient. Family was not present at the time. However,  called patient's son, Queta Robles, at  and he said that family knew that EMS took patient [facility] [place] [facility] [place]. V's.  gave Queta Robles ED number to call for updates on patient's condition.  also offered emotional support to Queta Robles. PATIENT BELONGINGS:  This  did not handle patient's belongings. ANY BELONGINGS OF SIGNIFICANT VALUE NOTED:  Unknown    REGISTRATION STAFF NOTIFIED? Yes    WHAT IS YOUR SPIRITUAL CARE PLAN FOR THIS PATIENT?:  Chaplains would continue to visit for ongoing assessment of patient's condition and for spiritual and emotional support. Electronically signed by Fr. Anni Maurice, on 12/2/2020 at 11:07 AM.  [facility]  [phone]       12/02/20 1105   Encounter Summary   Services provided to: Patient   Support System Family members   Continue Visiting   (12/02/2020)   Complexity of Encounter Moderate   Length of Encounter 1 hour   Routine   Type Initial   Assessment Approachable   Crisis   Type Trauma

## 2020-12-02 NOTE — ED NOTES
Bed: 15  Expected date:   Expected time:   Means of arrival:   Comments:  trauma     Jv Lee RN  12/02/20 1117

## 2020-12-02 NOTE — PROGRESS NOTES
C- Spine Evaluation for Spine Clearance:        C-Spine precautions of C-collar with spinal neutrality maintained since arrival with current exam directed at further evaluation of spine for clearance purposes. Pt chart and current images reviewed. CT C-Spine negative for acute fracture, subluxation, or traumatic injury. Patient does not have a distracting injury, is not acutely intoxicated and is alert, oriented and fully able to participate in exam.      Pt denies c-spine pain while resting in c-collar. C-collar removed w/ c-spine neutrality maintained. Pt denies midline pain with palpation of spinous processes and axial loading. Pt demonstrated full flexion, extension, and SB ROM without complaints of pain. TLS precautions of supine position maintained since arrival.  Pt denies midline pain with palpation of spinous processes. CT dorsal lumbar negative for acute fracture, subluxation, or traumatic injury. C-spine is considered cleared w/out need for further imaging, evaluation, or continuation of c-collar. TLS considered clear w/out need for further imagine, evaluation, or continuation of supine bedrest precautions.       Spine Tenderness ROM   Cervical 0 /10 Normal   Thoracic 0 /10 Normal   Lumbar 0 /10 Normal     Musculoskeletal    Joint Tenderness Swelling ROM   Right shoulder mild absent diminished   Left shoulder mild absent diminished   Right elbow absent absent normal   Left elbow absent absent normal   Right wrist absent absent normal   Left wrist absent absent normal   Right hand grasp absent absent normal   Left hand grasp absent absent normal   Right hip absent absent normal   Left hip absent absent normal   Right knee mild absent normal   Left knee mild absent normal   Right ankle absent absent normal   Left ankle absent absent normal   Right foot mild absent normal   Left foot mild absent normal       No edema noted, generalized joint pain, states much is chronic but exacerbated by current position etc.  Specifically has no neck pain but has L>R deltoid pain.       Shoulder films etc pending

## 2020-12-02 NOTE — ED PROVIDER NOTES
[facility] ED  Emergency Department  Emergency Medicine Resident Sign-out     Care of Lucita Higginbotham was assumed from Dr. Jazmin Remy and is being seen for No chief complaint on file. .  The patient's initial evaluation and plan have been discussed with the prior provider who initially evaluated the patient. EMERGENCY DEPARTMENT COURSE / MEDICAL DECISION MAKING:       MEDICATIONS GIVEN:  Orders Placed This Encounter   Medications    dextrose 50 % solution     ALEXI JOHNSON: cabinet override    iopamidol (ISOVUE-370) 76 % injection 130 mL    dextrose 50 % IV solution    DISCONTD: dextrose 5 % and 0.9 % nacl bolus    furosemide (LASIX) injection 20 mg    dextrose 5 % and 0.9 % NaCl 5-0.9 % infusion     Orlin Garland: cabinet override    dextrose 10 % infusion       LABS / RADIOLOGY:     Labs Reviewed   TRAUMA PANEL - Abnormal; Notable for the following components:       Result Value    BUN 37 (*)     WBC 14.5 (*)     CREATININE 1.14 (*)     Glucose 32 (*)     Anion Gap 7 (*)     pH, Jean Claude 7.273 (*)     pCO2, Jean Claude 64.0 (*)     O2 Sat, Jean Claude 53.9 (*)     All other components within normal limits   MYOGLOBIN, SERUM - Abnormal; Notable for the following components:    Myoglobin 244 (*)     All other components within normal limits   POC GLUCOSE FINGERSTICK - Abnormal; Notable for the following components:    POC Glucose 25 (*)     All other components within normal limits   POC GLUCOSE FINGERSTICK - Abnormal; Notable for the following components:    POC Glucose 121 (*)     All other components within normal limits   POC GLUCOSE FINGERSTICK - Abnormal; Notable for the following components:    POC Glucose 55 (*)     All other components within normal limits   COVID-19   CK   URINE RT REFLEX TO CULTURE   TSH WITH REFLEX   TROPONIN   TROPONIN   TYPE AND SCREEN       Xr Shoulder Left 1 Vw    Result Date: 12/2/2020  EXAMINATION: ONE XRAY VIEW OF THE LEFT SHOULDER 12/2/2020 12:43 pm COMPARISON: None. HISTORY: ORDERING SYSTEM PROVIDED HISTORY: found down, pain TECHNOLOGIST PROVIDED HISTORY: found down, pain Reason for Exam: found down, pain FINDINGS: Single view of the left shoulder was submitted. No gross evidence of acute fracture. Mild inferior subluxation of the shoulder. Limited evaluation with only one view submitted. Inferior subluxation of the shoulder is nonspecific but could be related to positioning versus a joint effusion. Ct Head Wo Contrast    Result Date: 12/2/2020  EXAMINATION: CT OF THE HEAD WITHOUT CONTRAST  12/2/2020 10:55 am TECHNIQUE: CT of the head was performed without the administration of intravenous contrast. Dose modulation, iterative reconstruction, and/or weight based adjustment of the mA/kV was utilized to reduce the radiation dose to as low as reasonably achievable. COMPARISON: None. HISTORY: ORDERING SYSTEM PROVIDED HISTORY: trauma fall found down TECHNOLOGIST PROVIDED HISTORY: trauma fall found down Reason for Exam: found down, trauma priority FINDINGS: BRAIN/VENTRICLES: There is no acute intracranial hemorrhage, mass effect or midline shift. No abnormal extra-axial fluid collection. The gray-white differentiation is maintained without evidence of an acute infarct. There is no evidence of hydrocephalus. ORBITS: The visualized portion of the orbits demonstrate no acute abnormality. SINUSES: The visualized paranasal sinuses and mastoid air cells demonstrate no acute abnormality. SOFT TISSUES/SKULL:  No acute abnormality of the visualized skull or soft tissues. BRAIN/VENTRICLES: There is no acute intracranial hemorrhage, mass effect or midline shift. No abnormal extra-axial fluid collection. The gray-white differentiation is maintained without evidence of an acute infarct. There is no evidence of hydrocephalus. Mild volume loss. There is no evidence for intracranial hemorrhage or hematoma.   No skull fracture there appears to be soft tissue swelling post right scalp ORBITS: The visualized portion of the orbits demonstrate no acute abnormality. SINUSES: The visualized paranasal sinuses and mastoid air cells demonstrate no acute abnormality. SOFT TISSUES/SKULL:  Soft tissue swelling posterior scalp on the right     No acute intracranial abnormality. Ct Cervical Spine Wo Contrast    Result Date: 12/2/2020  EXAMINATION: CT OF THE CERVICAL SPINE WITHOUT CONTRAST 12/2/2020 10:55 am TECHNIQUE: CT of the cervical spine was performed without the administration of intravenous contrast. Multiplanar reformatted images are provided for review. Dose modulation, iterative reconstruction, and/or weight based adjustment of the mA/kV was utilized to reduce the radiation dose to as low as reasonably achievable. COMPARISON: None. HISTORY: ORDERING SYSTEM PROVIDED HISTORY: trauma fall found down TECHNOLOGIST PROVIDED HISTORY: trauma fall found down Reason for Exam: found down, trauma priority FINDINGS: No acute fracture. No subluxation. Multilevel degenerative changes with severe disc space narrowing and endplate degenerative change at C4-C5. No prevertebral soft tissue swelling. Cervical spine degenerative changes. No acute traumatic findings. Ct Thoracic Spine W Contrast    Result Date: 12/2/2020  EXAMINATION: CT OF THE CHEST, ABDOMEN, AND PELVIS WITH CONTRAST; CT OF THE THORACIC SPINE WITH CONTRAST; CT OF THE LUMBAR SPINE WITHOUT CONTRAST 12/2/2020 10:56 am TECHNIQUE: CT of the chest, abdomen and pelvis was performed with the administration of intravenous contrast. Multiplanar reformatted images are provided for review. Dose modulation, iterative reconstruction, and/or weight based adjustment of the mA/kV was utilized to reduce the radiation dose to as low as reasonably achievable.; CT of the thoracic spine was performed with the administration of intravenous contrast.  Multiplanar reformatted images are provided for review.  Dose modulation, iterative reconstruction, and/or weight mid to lower thoracic spine. Vacuum disc formation at multiple levels within the lumbar spine. Broad-based disc bulges and multilevel facet arthropathy within the lower lumbar spine. No evidence for acute traumatic injury to the chest, abdomen, or pelvis. No fracture or malalignment of the thoracolumbar spine. Multiple additional chronic findings as described above. Ct Lumbar Spine Wo Contrast    Result Date: 12/2/2020  EXAMINATION: CT OF THE CHEST, ABDOMEN, AND PELVIS WITH CONTRAST; CT OF THE THORACIC SPINE WITH CONTRAST; CT OF THE LUMBAR SPINE WITHOUT CONTRAST 12/2/2020 10:56 am TECHNIQUE: CT of the chest, abdomen and pelvis was performed with the administration of intravenous contrast. Multiplanar reformatted images are provided for review. Dose modulation, iterative reconstruction, and/or weight based adjustment of the mA/kV was utilized to reduce the radiation dose to as low as reasonably achievable.; CT of the thoracic spine was performed with the administration of intravenous contrast.  Multiplanar reformatted images are provided for review. Dose modulation, iterative reconstruction, and/or weight based adjustment of the mA/kV was utilized to reduce the radiation dose to as low as reasonably achievable.; CT of the lumbar spine was performed without the administration of intravenous contrast. Multiplanar reformatted images are provided for review. Dose modulation, iterative reconstruction, and/or weight based adjustment of the mA/kV was utilized to reduce the radiation dose to as low as reasonably achievable. COMPARISON: None HISTORY: ORDERING SYSTEM PROVIDED HISTORY: trauma fall found down TECHNOLOGIST PROVIDED HISTORY: trauma fall found down Reason for Exam: trauma , fall FINDINGS: Chest/abdomen/pelvis: Mediastinum: Visualized portions of the thyroid gland are unremarkable. No evidence for mediastinal hematoma or pericardial fluid collection. No thoracic aortic abnormality.   No central pulmonary arterial filling defect. Lungs/pleura: Linear atelectasis/scarring within the lingula and right middle lobe. Subtle dependent opacities bilaterally suggest atelectasis. No definite focal airspace consolidation, sizeable pleural effusion or pneumothorax. No pulmonary contusion. Central airways are patent and clear. Organs: Cholelithiasis. No gallbladder wall thickening or pericholecystic fluid. The liver, pancreas, and spleen are unremarkable. GI/Bowel: No bowel obstruction or inflammation. No free intraperitoneal air or fluid to suggest viscus perforation. Normal appendix. Small bowel loops are normal in caliber. No evidence for hiatal hernia. Pelvis: No pelvic free fluid Peritoneum/Retroperitoneum: Adrenal glands are normal in size and configuration. No adrenal hematoma or perinephric fluid collection. The left kidney is atrophic. Urinary bladder is grossly unremarkable. Infrarenal IVC filter noted in place. Bones/Soft Tissues: No acute osseous abnormality. Thoracolumbar spine: No fracture or malalignment of the thoracolumbar spine. Advanced degenerative disease with bridging osteophytes within the mid to lower thoracic spine. Vacuum disc formation at multiple levels within the lumbar spine. Broad-based disc bulges and multilevel facet arthropathy within the lower lumbar spine. No evidence for acute traumatic injury to the chest, abdomen, or pelvis. No fracture or malalignment of the thoracolumbar spine. Multiple additional chronic findings as described above. Xr Shoulder Left (min 2 Views)    Result Date: 12/2/2020  EXAMINATION: TWO XRAY VIEWS OF THE LEFT SHOULDER 12/2/2020 3:24 pm COMPARISON: 12/02/2020 HISTORY: ORDERING SYSTEM PROVIDED HISTORY: trauma, found down TECHNOLOGIST PROVIDED HISTORY: trauma, found down Reason for Exam: found down FINDINGS: Images are limited by suboptimal positioning. No displaced fracture or dislocation is seen. Bony spurring noted at the greater tuberosity. Limited due to suboptimal positioning. No evidence of acute osseous abnormality. Ct Chest Abdomen Pelvis W Contrast    Result Date: 12/2/2020  EXAMINATION: CT OF THE CHEST, ABDOMEN, AND PELVIS WITH CONTRAST; CT OF THE THORACIC SPINE WITH CONTRAST; CT OF THE LUMBAR SPINE WITHOUT CONTRAST 12/2/2020 10:56 am TECHNIQUE: CT of the chest, abdomen and pelvis was performed with the administration of intravenous contrast. Multiplanar reformatted images are provided for review. Dose modulation, iterative reconstruction, and/or weight based adjustment of the mA/kV was utilized to reduce the radiation dose to as low as reasonably achievable.; CT of the thoracic spine was performed with the administration of intravenous contrast.  Multiplanar reformatted images are provided for review. Dose modulation, iterative reconstruction, and/or weight based adjustment of the mA/kV was utilized to reduce the radiation dose to as low as reasonably achievable.; CT of the lumbar spine was performed without the administration of intravenous contrast. Multiplanar reformatted images are provided for review. Dose modulation, iterative reconstruction, and/or weight based adjustment of the mA/kV was utilized to reduce the radiation dose to as low as reasonably achievable. COMPARISON: None HISTORY: ORDERING SYSTEM PROVIDED HISTORY: trauma fall found down TECHNOLOGIST PROVIDED HISTORY: trauma fall found down Reason for Exam: trauma , fall FINDINGS: Chest/abdomen/pelvis: Mediastinum: Visualized portions of the thyroid gland are unremarkable. No evidence for mediastinal hematoma or pericardial fluid collection. No thoracic aortic abnormality. No central pulmonary arterial filling defect. Lungs/pleura: Linear atelectasis/scarring within the lingula and right middle lobe. Subtle dependent opacities bilaterally suggest atelectasis. No definite focal airspace consolidation, sizeable pleural effusion or pneumothorax. No pulmonary contusion. Central airways are patent and clear. Organs: Cholelithiasis. No gallbladder wall thickening or pericholecystic fluid. The liver, pancreas, and spleen are unremarkable. GI/Bowel: No bowel obstruction or inflammation. No free intraperitoneal air or fluid to suggest viscus perforation. Normal appendix. Small bowel loops are normal in caliber. No evidence for hiatal hernia. Pelvis: No pelvic free fluid Peritoneum/Retroperitoneum: Adrenal glands are normal in size and configuration. No adrenal hematoma or perinephric fluid collection. The left kidney is atrophic. Urinary bladder is grossly unremarkable. Infrarenal IVC filter noted in place. Bones/Soft Tissues: No acute osseous abnormality. Thoracolumbar spine: No fracture or malalignment of the thoracolumbar spine. Advanced degenerative disease with bridging osteophytes within the mid to lower thoracic spine. Vacuum disc formation at multiple levels within the lumbar spine. Broad-based disc bulges and multilevel facet arthropathy within the lower lumbar spine. No evidence for acute traumatic injury to the chest, abdomen, or pelvis. No fracture or malalignment of the thoracolumbar spine. Multiple additional chronic findings as described above. RECENT VITALS:      ,   ,  ,  ,      This patient is a 79 y.o. Female after fall from standing height. The patient was hypoglycemic for EMS. Blood sugar was in the 20s. She received oral glucose as difficult initially obtaining IV access. Repeat blood sugar in the 30s. Patient was started initially on D5 and was switched to D10. Improvement in blood glucose to 91 per nursing staff. CT imaging per trauma service negative for any acute imaging. X-ray imaging negative as well. Patient was admitted to the Intermed service. OUTSTANDING TASKS / RECOMMENDATIONS:    1. Awaiting bed     FINAL IMPRESSION:     1. Hypoglycemia    2. Fall from bed, initial encounter    3.  Syncope and collapse DISPOSITION:         DISPOSITION:  []  Discharge   []  Transfer -    [x]  Admission -     []  Against Medical Advice   []  Eloped   FOLLOW-UP: No follow-up provider specified.    DISCHARGE MEDICATIONS: New Prescriptions    No medications on file           Yanick Nicole MD  Emergency Medicine Resident  32 Lori Long MD  Resident  12/02/20 0763 [facility] Deann Ovalles MD  Resident  12/02/20 [id number]

## 2020-12-02 NOTE — ED NOTES
Pt resting quietly in her bed at this time. Pt requesting food and meal tray ordered at this time.       Laureano Salinas RN  12/02/20 0990

## 2020-12-02 NOTE — H&P
TRAUMA HISTORY AND PHYSICAL EXAMINATION  (V 2.0)    PATIENT NAME: Renard Trauma Daina  YOB: 1881  MEDICAL RECORD NO. [id number]   DATE: 12/2/2020  PRIMARY CARE PHYSICIAN: Peter Echevarria MD  PATIENT EVALUATED AT THE REQUEST OF :   Derrek    ACTIVATION   []Trauma Alert     [x] Trauma Priority     []Trauma Consult. IMPRESSION:     Patient Active Problem List   Diagnosis    Hypoglycemia     · Found down  · Hypoglycemia  · L shoulder pain    MEDICAL DECISION MAKING AND PLAN:     · Neuro intact  · Follow up imaging - pending CT and plain films  · Gentle IV hydration - patient has elevated Cr, one functional kidney with clinical signs of fluid overload  · No acute surgical intervention at this time  · Follow up COVID  · TERT  · Anticipate medicine evaluation    CONSULT SERVICES    [] Neurosurgery     [] Orthopedic Surgery    [] Cardiothoracic     [] Facial Trauma    [] Plastic Surgery (Burn)    [] Pediatric Surgery     [] Internal Medicine    [] Pulmonary Medicine    [] Other:        HISTORY:     SOURCE OF INFORMATION  Patient information was obtained from patient. History/Exam limitations: none. INJURY SUMMARY  Dried blood in nares  L shoulder pain    GENERAL DATA  Age 79 y.o.  female   Patient information was obtained from patient. History/Exam limitations: none.   Patient presented to the Emergency Department by ambulance where the patient received cervical collar prior to arrival.  Injury Date: 12/2/2020   Approximate Injury Time: 1030        Transport mode:   [x]Ambulance      [] Helicopter     []Car       [] Other  Referring Hospital:     [address], (e.g., home, farm, industry, street)  Specific Details of Location (e.g., bedroom, kitchen, garage): Home  Type of Residence (if occurred in home setting) (e.g., apartment, mobile home, single family home): single family home    MECHANISM OF INJURY  []Motor Vehicle Collision  Specific vehicle type involved (e.g., simfy, SUV, pickup truck):   Collision with (e.g., type of vehicle, building, barn, ditch, tree):   []Single Vehicle Collision     []           []Fatality in Same Vehicle            []Passenger:      []Front Seat        []Rear Seat    []Unrestrained   []Lap Belt Only Restrained   [] Shoulder Belt Only Restrained  [] 3 Point Restrained    []Front Air Bag  []Side Air Bag  []Other Air Bag []Air Bag Not Deployed    []Ejected     []Rollover     []Extricated       CHILD:  []Booster Seat  []Infant Car Seat  [] Child Car Seat   []Motorcycle Collision Wearing Helmet     []Yes     []No    []Unknown  []Bicycle Collision Wearing Helmet     []Yes     []No    []Unknown  []Pedestrian Struck      [x]Fall    []From Standing     [x]From Height  Unknown, possibly from a chair Ft     []Down Stairs  []Assault  []Gunshot  Specify caliber / type of gun: ____________________________  []Stabbing  Specify weapon type, size: _____________________________  []Burn     []Flame   []Scald   []Electrical   []Chemical           []Contact   []Inhalation   []House fire  []Other ______________________________________________________  []Other protective devices used / worn ___________________________    HISTORY: The patient is an 58-year-old female who presented today after being found down by EMS. The patient reportedly fell from a chair and landed face first.  She was able to press her medic alert button. When EMS arrived, patient's blood sugar was found to be in the 20s. She was given several oral glucose gels as they were unable to place IVs.  She remained awake and alert the entire time. Became more oriented, able to provide name and date of birth after receiving glucose. On examination, the patient is complaining of left shoulder pain and shortness of breath when laying flat. She denies headache, neck pain, back pain, chest, abdominal pain. She does report history of diabetes, coronary artery disease status post stenting on blood thinners. Unable to name the medications that she takes. Loss of Consciousness [x]No   []Yes Duration(min)    Total Fluids Given Prior To Arrival  cc    MEDICATIONS:   []  None     []  Information not available due to exam limitations documented above    Patient unable to remember names of all her medications    ALLERGIES:   []  None    []   Information not available due to exam limitations documented above   Patient has no allergy information on record. PAST MEDICAL HISTORY: []  None   []   Information not available due to exam limitations documented above     Diabetes  CAD s/p stenting on blood thinners  1 functional kidney      FAMILY HISTORY   []   Information not available due to exam limitations documented above    Patient unable to provide details      SOCIAL HISTORY  []   Information not available due to exam limitations documented above     has no history on file for tobacco.   has no history on file for alcohol.   has no history on file for drug.     PERTINENT SYSTEMIC REVIEW:  []   Information not available due to exam limitations documented above  Constitutional: negative for fevers  Eyes: negative for visual disturbance  Ears, nose, mouth, throat, and face: negative for sore throat  Respiratory: positive for shortness of breath  Cardiovascular: negative for chest pain  Gastrointestinal: negative for abdominal pain  Genitourinary:negative for dysuria  Integument/breast: positive for lower extremity wounds  Musculoskeletal:positive for left shoulder pain  Neurological: negative for weakness      PHYSICAL EXAMINATION:   [address] TO ARRIVAL     [x]No        []Yes      Variable  Score   Variable  Score  Eye opening [x]Spontaneous 4 Verbal  [x]Oriented  5     []To voice  3   []Confused  4    []To pain  2   []Inapp words  3    []None  1   []Incomp words 2       []None  1   Motor   [x]Obeys  6    []Localizes limits   POC GLUCOSE FINGERSTICK - Abnormal; Notable for the following components:    POC Glucose 121 (*)     All other components within normal limits   COVID-19   CK   URINE RT REFLEX TO CULTURE   TSH WITH REFLEX   TROPONIN   TROPONIN   TYPE AND SCREEN       Lizbeth Hoskins MD  12/2/20, 12:09 PM           Trauma Attending Werner Olea      I have reviewed the above [facility] note(s) and confirmed the key elements of the medical history and physical exam. I have seen and examined the pt. I have discussed the findings, established the care plan and recommendations with Resident, [facility] RN, bedside nurse.   Mild elevation in Cr   Hypoglycemia   Tertiary exam     Es Haywood DO  12/2/2020  8:37 PM

## 2020-12-02 NOTE — ED NOTES
Pt up to bedside toilet at this time with assistance from this nurse.       Ena Jeffries, TITUS  12/02/20 9751

## 2020-12-03 PROBLEM — I73.9 PERIPHERAL VASCULAR DISEASE, UNSPECIFIED (HCC): Status: ACTIVE | Noted: 2020-10-20

## 2020-12-03 PROBLEM — I87.2 VENOUS INSUFFICIENCY (CHRONIC) (PERIPHERAL): Status: ACTIVE | Noted: 2020-10-20

## 2020-12-03 PROBLEM — I89.0 LYMPHEDEMA, NOT ELSEWHERE CLASSIFIED: Status: ACTIVE | Noted: 2020-10-20
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
## 2020-12-03 NOTE — CONSULTS
Department of Neurology                                         Resident Consult Note    Reason for Consult:  Seizure like activity  Requesting Physician: Dr. Zenon Sainz    History Obtained From:  patient, electronic medical record, staff    CHIEF COMPLAINT:       Fall, left shoulder pain. HISTORY OF PRESENT ILLNESS:       The patient is a 79 y.o. female who presents with a fall and left shoulder pain and hypoglycemia. Patient was brought to [address] [place] [address] as a trauma priority secondary to a fall from chair. Patient was at her home when she pressed her life alert button after sustaining a fall from her chair landed on her face. Per EMS patient was found on the ground with blood glucose in 20s. Patient was given several oral glucose gels, as IV access was unable to be obtained. Patient became more oriented and appropriate afterwards. Repeat blood glucose was in 30s. Afterwards patient received IV glucose and repeat glucose was in the 40s upon arrival in the ER. Trauma work-up has been negative in the ER except for left shoulder x-ray consistent with inferior subluxation of the shoulder, is nonspecific but could be related to positioning versus a joint effusion. Patient was put on D50 IV solution for maintenance of hypoglycemia. Rapid response was called at 8 0 6 AM while patient was in the ER. Patient was found to be bradycardic at 35. Patient was not responding to any verbal or noxious stimuli. Patient was noticed to have seizure-like activity with right hand clenching, gaze deviated to left foaming at the edge of the mouth. This lasted for about 3 minutes. Afterwards patient returned to baseline without any confusion. Neurology was consulted for suspected seizure-like activity and history of seizures. Seizures:  Patient has past medical history of seizures and is on Keppra 1 g twice daily.   Patient is a poor historian and says usually when she has hemorrhage. No new hemorrhage or other interval change  CT head 3/10/2016:Trace left parietal subarachnoid hemorrhage.  Cannot exclude trace medial right frontoparietal subarachnoid hemorrhage    CT head 2/2/2016:Resolution of the left temporooccipital focal intraparenchymal hemorrhage  CT head 12/10/2015:Interval maturation of a small left posterior temporal parenchymal hemorrhage. No new hemorrhage is seen. CT head 12/4/2015: 1 cm focus of IPH left temporal occipital region appears unchanged from prior exam.  Moderate degree of hypodensity in the supratentorial brain likely related to chronic small vessel ischemic changes. CT head 12/3/2015:1 cm focus of intraparenchymal hemorrhage left temporal occipital region appears unchanged from prior exam.   MRI brain without 11/30/2015: 1.2 cm focus of IPH left temporal occipital region stable. Small amount of signal abnormality on DWI in region of hemorrhage likely related to blood products. Small focus of remote microhemorrhage within the linh. Findings may be due to cavernoma, hypertensive microangiopathy or amyloid angiopathy. Moderate degree of increased signal intensity on FLAIR and supratentorial brain findings are present to lesser extent within linh. Findings are nonspecific but in someone of this age most likely related to chronic small vessel ischemic changes. CT head 11/30/2015:Stable appearance of the intracranial contents with attention to small left temporal hematoma  CT head 11/30/2015:New 10 x 6 mm left posterior temporal peripheral hyperdensity is suspicious for acute hemorrhage, either intra-axial or subarachnoid  CTA H/N11/29/2015: iCAD  CT cervical 11/29/2015: Severe DJD C4 through C6 levels. Moderate to severe central spinal stenosis at the C4-5, C5-6 and C6-7 levels. MRI imaging of the cervical spine may be beneficial.  CT Head 11/29/2015 unremarkable    EEG 6/28/2016: This EEG shows the presence of mild left hemispheric slowing more prominent  in the frontal head area. Underlying structural processes need to be  considered. EEG 6/7/2016: This is a slightly abnormal EEG with background demonstrating 7 to 8 Hz  theta activity consistent with mild slowing. No epileptiform discharges  and no electrographic seizures noted. EEG 12/6/2015: This EEG performed during wakefulness and sleep demonstrated infrequent out  of phase reversal discharges in left posterior temporal region. This study   did not demonstrate any ongoing electrographic seizure activity. Clinical  correlation is recommended    Echocardiogram 6/18/2020: EF 55-60%. Left Ventricle: There is mild increased wall thickness/hypertrophy. Mitral Valve: There is mild to moderate regurgitation. All other pertinent imaging reviewed and were unremarkable. PAST MEDICAL HISTORY :       Past Medical History:        Diagnosis Date    Chronic diastolic heart failure (HCC)     Chronic kidney disease, stage III (moderate)     Coronary artery disease     Diabetes ([facility] [address]     Hyperlipidemia     Hypertension     LVH (left ventricular hypertrophy)     Pulmonary hypertension (HCC)     Right bundle branch block     Seizure disorder (HCC)     Late effects of CVA    Subarachnoid bleed (HCC)        Past Surgical History:        Procedure Laterality Date    CATARACT REMOVAL      Multiple surgeries    VENA CAVA FILTER PLACEMENT         Social History:   Social History     Socioeconomic History    Marital status:       Spouse name: Not on file    Number of children: Not on file    Years of education: Not on file    Highest education level: Not on file   Occupational History    Not on file   Social Needs    Financial resource strain: Not on file    Food insecurity     Worry: Not on file     Inability: Not on file    Transportation needs     Medical: Not on file     Non-medical: Not on file   Tobacco Use    Smoking status: Not on file   Substance and Sexual Activity    Alcohol use: Not on file    Drug use: Not on file    Sexual activity: Not on file   Lifestyle    Physical activity     Days per week: Not on file     Minutes per session: Not on file    Stress: Not on file   Relationships    Social connections     Talks on phone: Not on file     Gets together: Not on file     Attends [employer] service: Not on file     Active member of club or organization: Not on file     Attends meetings of clubs or organizations: Not on file     Relationship status: Not on file    Intimate partner violence     Fear of current or ex partner: Not on file     Emotionally abused: Not on file     Physically abused: Not on file     Forced sexual activity: Not on file   Other Topics Concern    Not on file   Social History Narrative    Not on file       Family History:       Problem Relation Age of Onset    Diabetes Mother     Heart Disease Mother     Diabetes Father     Hypertension Father     Colon Cancer Father     Diabetes Brother     Heart Disease Brother        Allergies:  Patient has no allergy information on record.     Home Medications:  Prior to Admission medications    Not on File       Current Medications:   Current Facility-Administered Medications: LORazepam (ATIVAN) 2 MG/ML injection, , ,   atropine injection 1 mg, 1 mg, Intravenous, Once  dextrose 10 % infusion, , Intravenous, Continuous  atorvastatin (LIPITOR) tablet 10 mg, 10 mg, Oral, Daily  spironolactone (ALDACTONE) tablet 25 mg, 25 mg, Oral, Daily  levETIRAcetam (KEPPRA) tablet 1,000 mg, 1,000 mg, Oral, BID  clopidogrel (PLAVIX) tablet 75 mg, 75 mg, Oral, Daily  metoprolol tartrate (LOPRESSOR) tablet 12.5 mg, 12.5 mg, Oral, BID  aspirin EC tablet 81 mg, 81 mg, Oral, Daily  sodium chloride flush 0.9 % injection 10 mL, 10 mL, Intravenous, 2 times per day  sodium chloride flush 0.9 % injection 10 mL, 10 mL, Intravenous, PRN  potassium chloride (KLOR-CON M) extended release tablet 40 mEq, 40 mEq, Oral, PRN **OR** potassium bicarb-citric acid (EFFER-K) effervescent tablet 40 mEq, 40 mEq, Oral, PRN **OR** potassium chloride 10 mEq/100 mL IVPB (Peripheral Line), 10 mEq, Intravenous, PRN  magnesium sulfate 1 g in dextrose 5% 100 mL IVPB, 1 g, Intravenous, PRN  acetaminophen (TYLENOL) tablet 650 mg, 650 mg, Oral, Q6H PRN **OR** acetaminophen (TYLENOL) suppository 650 mg, 650 mg, Rectal, Q6H PRN  polyethylene glycol (GLYCOLAX) packet 17 g, 17 g, Oral, Daily PRN  promethazine (PHENERGAN) tablet 12.5 mg, 12.5 mg, Oral, Q6H PRN **OR** ondansetron (ZOFRAN) injection 4 mg, 4 mg, Intravenous, Q6H PRN  nicotine (NICODERM CQ) 21 MG/24HR 1 patch, 1 patch, Transdermal, Daily PRN  enoxaparin (LOVENOX) injection 40 mg, 40 mg, Subcutaneous, Daily  insulin lispro (HUMALOG) injection vial 0-6 Units, 0-6 Units, Subcutaneous, TID WC  insulin lispro (HUMALOG) injection vial 0-3 Units, 0-3 Units, Subcutaneous, Nightly  glucose (GLUTOSE) 40 % oral gel 15 g, 15 g, Oral, PRN  dextrose 50 % IV solution, 12.5 g, Intravenous, PRN  glucagon (rDNA) injection 1 mg, 1 mg, Intramuscular, PRN  dextrose 5 % solution, 100 mL/hr, Intravenous, PRN    REVIEW OF SYSTEMS:       CONSTITUTIONAL: negative for fatigue and malaise   EYES: negative for double vision and photophobia    HEENT: negative for tinnitus and sore throat   RESPIRATORY: negative for cough, shortness of breath   CARDIOVASCULAR: negative for chest pain, palpitations   GASTROINTESTINAL: negative for nausea, vomiting   GENITOURINARY: negative for incontinence   MUSCULOSKELETAL: negative for neck or back pain   NEUROLOGICAL: negative for seizures   PSYCHIATRIC: negative for fatigue     Review of systems otherwise negative. PHYSICAL EXAM:       /79   Pulse 74   Temp 98.6 °F (37 °C) (Oral)   Resp 15   Wt 195 lb (88.5 kg)   SpO2 96%     CONSTITUTIONAL:  Well developed, well nourished, alert and oriented x 3, in no acute distress. GCS 15, nontoxic. No dysarthria, no aphasia. EOMI.     HEAD:  normocephalic, 0 - no visual loss  4. Facial Palsy:  1 - minor paralysis (flattened nasolabial fold, asymmetric on smiling)  5a. Motor left arm:  0 - no drift, limb holds 90 (or 45) degrees for full 10 seconds  5b. Motor right arm:  0 - no drift, limb holds 90 (or 45) degrees for full 10 seconds  6a. Motor left leg: exam was limited due to poor effort,bilateral legs swelling  6b. Motor right leg:   exam was limited due to poor effort,bilateral legs swelling,  7. Limb Ataxia:  0 - absent  8. Sensory:  0 - normal; no sensory loss  9. Best Language:  0 - no aphasia, normal  10. Dysarthria:  0 - normal  11. Extinction and Inattention:  0 - no abnormality    TOTAL:  1     SKIN:  no rash      LABS AND IMAGING:     CBC with Differential:    Lab Results   Component Value Date    WBC 9.5 12/03/2020    RBC 3.64 12/03/2020    HGB 11.4 12/03/2020    HCT 36.6 12/03/2020     12/03/2020    .5 12/03/2020    MCH 31.3 12/03/2020    MCHC 31.1 12/03/2020    RDW 13.5 12/03/2020     BMP:    Lab Results   Component Value Date     12/03/2020    K 5.0 12/03/2020     12/03/2020    CO2 26 12/03/2020    BUN 32 12/03/2020    CREATININE 1.01 12/03/2020    CALCIUM 8.8 12/03/2020    GFRAA >60 12/03/2020    LABGLOM 53 12/03/2020    GLUCOSE 98 12/03/2020       Radiology Review:  As above      ASSESSMENT AND PLAN:       Patient Active Problem List   Diagnosis    Hypoglycemia    Type 2 diabetes mellitus ([address]    Coronary artery disease    Chronic kidney disease, stage III (moderate)    Essential hypertension    Hyperlipidemia    LVH (left ventricular hypertrophy)    Pulmonary hypertension (HCC)    Right bundle branch block    Seizure disorder (HCC)    Chronic diastolic heart failure ([address]    Fall from chair       Provoked seizure due to hypoglycemia,UTI    history of traumatic left parietal SAH, left temporal occipital IPH s/p IV TPA, seizure disorder    Will load with keppra 500 mg iv x1.   Recommend continue Keppra 1 g twice daily. Hypoglycemia and medical management per primary. Seizure precautions. Ativan 1 mg as needed for seizures lasting greater than 5 minutes, maximum 4 mg in 24 hours. Other comorbidities include:Hypertension, Bell's palsy, CKD stage III, diabetes, hyperlipidemia, CAD status post 4 stents, subarachnoid bleed in 2016, right bundle branch block, seizure disorder, left posterior tibial and peroneal vein DVT status post IVC filter in 2015, hemorrhagic stroke in November 2015 getting IV TPA for L sided weakness with hemorrhagic conversion    Additional recommendations may follow. Thank you for your consult.        Lorenzo Coughlin MD   12/3/2020  9:04 AM

## 2020-12-03 NOTE — PROCEDURES
[address] [place] [address] 30      ELECTROENCEPHALOGRAM REPORT        REFERRING PHYSICIAN:  Xuan Garcia DO  PATIENT NAME:Lori Tomlin  PATIENT MRN: [id number]  DATE OF EE/3/2020    BRIEF HISTORY:    70-year-old female patient with past medical history of hypertension, heart block, seizure disorder, recurrent hypoglycemia, was admitted to the hospital due to syncopal/seizure episode associated with hypoglycemia. EEG was ordered to rule out seizures. CURRENT ANTI-EPILEPTIC MEDICATIONS:   Keppra 1000mg BID    EEG DESCRIPTION:   During maximal wakefulness and drowsiness, the background was well organized, slow and continuous consisting of an admixture of frequency of delta and theta with low amplitude ranging between 4-30uV. There was a posterior dominant rhythm at 7 Hz, which was symmetric and reactive to eye opening/eye closure. Spontaneous variability to stimulation were present. No persistent focal asymmetries or abnormalities were seen. No epileptiform discharges or clinical or electrographic seizures were recorded. Stage I sleep were recorded with slow rolling eye movements, other sleep structures were not seen. Hyperventilation was not performed. Photic stimulation did not activate abnormal activity. single lead EKG shows diffuse EKG lead artifacts. CLASSIFICATION:  Abnormal 1 (Awake, drowsiness)  1. Background slow    IMPRESSION:  This was an abnormal routine awake and drowsiness EEG which showed evidence of mild encephalopathy, not specific as to etiology. There were no epileptiform discharges or EEG/clinical seizures on this recording. Rayshawn Lei MD  PGY-3, Neurology resident  [facility]. 3:01 PM 12/3/2020     I reviewed and discussed with the resident of his/her note, the documented findings and plan of care.   I have personally reviewed the EEG, I agree with the report with my

## 2020-12-03 NOTE — ED NOTES
Bed: 43  Expected date:   Expected time:   Means of arrival:   Comments:  [facility]  12/02/20 4315

## 2020-12-03 NOTE — OP NOTE
Date:   12/3/2020  Patient name: Alejandro Muñoz  Date of admission:  12/2/2020 10:31 AM  MRN:   [id number]  YOB: 1940    Temporary Pacemaker Placement     Operators:  Primary: Dr. Carolina Henriquez    Indications for temporary pacemaker placement: Complete Heart Block      Procedure performed: Temporary Pacemaker Placement     Access: Right femoral Vein    Procedure:  After informed consent was obtained with explanation of the risks and benefits, the Right femoral vein was prepped and draped in sterile fashion. 1% lidocaine was used for local block. A 5 Fr catheter was inserted, and a  bipolar pacing catheter was then advanced. The procedure was performed under fluoroscopic guidance. It was further advanced into the right atrium and then the right ventricle at which point pacing was achieved. The balloon was deflated. Capture was reachieved at 2 mAmp. The patient tolerated the procedure well. The pacing threshold was set at 60 bpm.      Complications: None  Estimated blood loss less than 5 ml     Alexis Benitez MD  Fellow, [address] [place] [address]          Physician Statement  I have discussed the case of Alejandro Muñoz including pertinent history and exam findings with the resident. I have seen and examined the patient and the key elements of the encounter have been performed by me. I agree with the assessment, plan and orders as documented by the resident With changes made to the note. Procedure performed by me.     Electronically signed by Kendy Islas MD on 12/3/2020 at 5:31 PM.    [facility]      [phone]

## 2020-12-03 NOTE — ED NOTES
Cardiology arrives at [facility], [address] [place] [address]  12/03/20 [id number]

## 2020-12-03 NOTE — ED NOTES
Cath team arrives at bedside to take patient to cath lab for temp pacemaker    Writer also present for transport.     Patient nondistressed, stable  HR remains mid [address] [facility] [place], [facility]  12/03/20 [id number]

## 2020-12-03 NOTE — ED NOTES
Pt adjusted in bed, lines flushed, left leg wrapped loosely with ACE wrap for comfort. BSC to bedside for future use, pt educated to use call light to use BSC. Pt resting in bed, RR even and unlabored, A&Ox4. No needs expressed at this time. Will continue to monitor.      Jessie Mccurdy RN  12/02/20 [id number]

## 2020-12-03 NOTE — ED NOTES
Urine collected, labeled, sent to lab  Will continue to monitor  Call light in hand     Lilly Mitchell, [facility]  12/03/20 0247

## 2020-12-03 NOTE — PROGRESS NOTES
Physical Therapy   DATE: 12/3/2020    NAME: Mercedes Putnam  MRN: [id number]   : 1940    Patient not seen this date for Physical Therapy due to:  [] Blood transfusion in progress  [] Hemodialysis  [] Patient Declined  [] Spine Precautions   [] Strict Bedrest  [] Surgery/ Procedure  [] Testing      [x] Other Hold PT eval for today with  Seizure activity this AM per nurse       [] PT is being discontinued at this time. Patient independent. No further needs. [] PT is being discontinued at this time due to declining physical/ medical status. Therapy is not appropriate at this time.     Abbi Olivera, PT

## 2020-12-03 NOTE — CONSULTS
[facility]    Inpatient Consultation Note               Today's Date: 12/3/2020  Patient Name: Arti Kent  Date of admission: 12/2/2020 10:31 AM  Patient's age: 79 y.o., 1940  Admission Dx: Hypoglycemia [E16.2]    Reason for  Consult:  Syncope    Requesting Physician: Luba Payne DO    CHIEF COMPLAINT:  Fall/syncope   No chief complaint on file. History Obtained From:  Patient, Electronic medical record  HISTORY OF PRESENT ILLNESS:      The patient is a 79 y.o. female who is admitted to the hospital for syncope. She has a past medical history of essential hypertension/ diabetes mellitus type 2/ hyperlipidemia. Patient is poor historian but she reports that she was sitting on a chair then she found herself on the floor. When the EMS arrived her blood glucose was in the 20s and she was given oral glucose gel and was brought to the hospital and was started on IV dextrose. While she was in the ER rapid response was called for bradycardia. EKG showed complete heart block. Telemetry review showed Mobitz type II heart block then during my second evaluation patient went into complete heart block. Past Medical History:   has a past medical history of Chronic diastolic heart failure ([address] Chronic kidney disease, stage III (moderate), Coronary artery disease, Diabetes ([address] Hyperlipidemia, Hypertension, LVH (left ventricular hypertrophy), Pulmonary hypertension ([address] Right bundle branch block, Seizure disorder ([address] and Subarachnoid bleed ([address] Past Surgical History:   has a past surgical history that includes Cataract removal and Vena Cava Filter Placement.      Home Medications:    Prior to Admission medications    Not on File        Current Facility-Administered Medications: LORazepam (ATIVAN) 2 MG/ML injection, , ,   atropine injection 1 mg, 1 mg, Intravenous, Once  dextrose 10 % infusion, , Intravenous, Continuous  atorvastatin (LIPITOR) tablet 10 mg, 10 mg, Oral, Daily  spironolactone (ALDACTONE) tablet 25 mg, 25 mg, Oral, Daily  levETIRAcetam (KEPPRA) tablet 1,000 mg, 1,000 mg, Oral, BID  clopidogrel (PLAVIX) tablet 75 mg, 75 mg, Oral, Daily  metoprolol tartrate (LOPRESSOR) tablet 12.5 mg, 12.5 mg, Oral, BID  aspirin EC tablet 81 mg, 81 mg, Oral, Daily  sodium chloride flush 0.9 % injection 10 mL, 10 mL, Intravenous, 2 times per day  sodium chloride flush 0.9 % injection 10 mL, 10 mL, Intravenous, PRN  potassium chloride (KLOR-CON M) extended release tablet 40 mEq, 40 mEq, Oral, PRN **OR** potassium bicarb-citric acid (EFFER-K) effervescent tablet 40 mEq, 40 mEq, Oral, PRN **OR** potassium chloride 10 mEq/100 mL IVPB (Peripheral Line), 10 mEq, Intravenous, PRN  magnesium sulfate 1 g in dextrose 5% 100 mL IVPB, 1 g, Intravenous, PRN  acetaminophen (TYLENOL) tablet 650 mg, 650 mg, Oral, Q6H PRN **OR** acetaminophen (TYLENOL) suppository 650 mg, 650 mg, Rectal, Q6H PRN  polyethylene glycol (GLYCOLAX) packet 17 g, 17 g, Oral, Daily PRN  promethazine (PHENERGAN) tablet 12.5 mg, 12.5 mg, Oral, Q6H PRN **OR** ondansetron (ZOFRAN) injection 4 mg, 4 mg, Intravenous, Q6H PRN  nicotine (NICODERM CQ) 21 MG/24HR 1 patch, 1 patch, Transdermal, Daily PRN  enoxaparin (LOVENOX) injection 40 mg, 40 mg, Subcutaneous, Daily  insulin lispro (HUMALOG) injection vial 0-6 Units, 0-6 Units, Subcutaneous, TID WC  insulin lispro (HUMALOG) injection vial 0-3 Units, 0-3 Units, Subcutaneous, Nightly  glucose (GLUTOSE) 40 % oral gel 15 g, 15 g, Oral, PRN  dextrose 50 % IV solution, 12.5 g, Intravenous, PRN  glucagon (rDNA) injection 1 mg, 1 mg, Intramuscular, PRN  dextrose 5 % solution, 100 mL/hr, Intravenous, PRN    Allergies:  Patient has no allergy information on record. Social History:        Family History: family history includes Colon Cancer in her father; Diabetes in her brother, father, and mother; Heart Disease in her brother and mother; Hypertension in her father.      REVIEW OF SYSTEMS:      · Constitutional: there has been no unanticipated weight loss. · Eyes: No visual changes   · ENT: No Headaches  · Cardiovascular:  Remaining as above  · Respiratory: No cough  · Gastrointestinal: No abdominal pain. No change in bowel or bladder habits. · Genitourinary: No dysuria, trouble voiding, or hematuria. · Musculoskeletal: No joint complaints. · Neurological: No headache  · Hematologic/Lymphatic: No abnormal bruising or bleeding      PHYSICAL EXAM:      BP (!) 94/49   Pulse 75   Temp 98.6 °F (37 °C) (Oral)   Resp 13   Wt 195 lb (88.5 kg)   SpO2 97%    No intake or output data in the 24 hours ending 12/03/20 1239      Constitutional and General Appearance:    Alert, cooperative, no distress and appears stated age  Respiratory:  · No for increased work of breathing. · On auscultation: clear to auscultation bilaterall  Cardiovascular:  · Regular S1 and S2.   · No murmurs  Abdomen:   · No masses or tenderness  · Bowel sounds present  Extremities:  ·  No Cyanosis or Clubbing  ·  Lower extremity edema:   Neurological:  · Alert and oriented. · Moves all extremities well    DATA:    Diagnostics:      ECHO:    Pending     Labs:     CBC:   Recent Labs     12/02/20  1049 12/03/20  0747   WBC 14.5* 9.5   HGB 12.9 11.4*   HCT 41.3 36.6    222     BMP:   Recent Labs     12/02/20  1049  12/03/20  0747 12/03/20  1138     --  135  --    K 4.9  --  5.0  --    CO2 28  --  26  --    BUN 37*  --  32*  --    CREATININE 1.14*  --  1.01*  --    LABGLOM NOT REPORTED  --  53*  --    GLUCOSE 32*   < > 98 92    < > = values in this interval not displayed. Pro-BNP:  No results for input(s): PROBNP in the last 72 hours. BNP: No results for input(s): BNP in the last 72 hours.   PT/INR:   Recent Labs     12/02/20  1049 12/03/20  0747   PROTIME 10.5 10.6   INR 1.0 1.0     APTT:  Recent Labs     12/02/20  1049   APTT 26.7     CARDIAC ENZYMES:  Recent Labs     12/02/20  1049   CKTOTAL 67 Recent Labs     12/02/20  1624 12/03/20  0226 12/03/20  0747   TROPONINT NOT REPORTED NOT REPORTED NOT REPORTED       FASTING LIPID PANEL:No results found for: HDL, LDLDIRECT, LDLCALC, TRIG  LIVER PROFILE:No results for input(s): AST, ALT, LABALBU in the last 72 hours. Patient's Active Problem List  Active Problems:    Hypoglycemia    Type 2 diabetes mellitus (HCC)    Coronary artery disease    Chronic kidney disease, stage III (moderate)    Essential hypertension    Pulmonary hypertension (HCC)    Right bundle branch block    Seizure disorder (HCC)    Chronic diastolic heart failure ([address]    Fall from chair  Resolved Problems:    * No resolved hospital problems. *        IMPRESSION:    1. AV block 3rd degree. Complete heart block  2. RBBB with left posterior fascicular block   3. Syncope likely due to advanced AV block  4. Hypoglycemia  5. History of seizures  6. Type 2 diabetes mellitus  7. Essential hypertension  8. Hyperlipidemia   10. History of CAD ?? RECOMMENDATIONS:      Hold BB and any AV blocking agents   Will get 2D echocardiogram to evaluate structural and functional heart disease. Will plan for temporary pacemaker   Ischemia workup depending on records from previous cardiologist.       Thank you for allowing us to participate in the care of [facility]. If you have any questions or concerns, please do not hesitate to contact us. Discussed with patient and Nurse. Rico Zaragoza M.D. Fellow, [address]        Please note that part of this chart were generated using voice recognition  dictation software. Although every effort was made to ensure the accuracy of this automated transcription, some errors in transcription may have occurred. Attestation signed by      Attending Physician Statement:    I have discussed the care of  [facility] , including pertinent history and exam findings, with the Cardiology fellow/resident. I have seen and examined the patient and the key elements of all parts of the encounter have been performed by me. I agree with the assessment, plan and orders as documented by the fellow/resident, after I modified exam findings and plan of treatments, and the final version is my approved version of the assessment.      Additional Comments:

## 2020-12-03 NOTE — H&P
oral glucose gels, as IV access was unable to be obtained, however patient become more oriented and appropriate able to provide name date of birth, repeat glucoses after the oral glucose was in the 30s and patient then was able to obtain IV access and got IV glucose and repeat glucose was 41 on arrival.      She was brought to the emergency room and trauma work-up initiated without significant findings. And patient was put on a D10 IV solution for maintenance of hypoglycemia which was stopped at the time of my evaluation as her glucose returned to normal and she was fed. Work up in the Emergency Room          Laboratories:   Metabolic panel. -Sodium 121, potassium 4.9, chloride 102, CO2 28, BUN 37, creatinine 1.14, anion gap 7. Glucose 32 with subsequent 80 and 91 and 180  TSH 12.01 thyroxine 1.08.   GI/Liver Panel-not done  Hematology. -Mild leukocytosis with a WBC of 14.5 however no acute anemias with a hemoglobin of 12.9 hematocrit of 41.3  Coagulation-PT/INR 10.51.0 PTT 26.7  Cardiac Markers-high sensitive troponin 30, myoglobin 244,  EKG- sinus arrhythmia, Right bundle branch block, no acute ST segment changes no T waves Q waves noted septal inferior-Per ER provider  Urine-not done      Imaging and Diagnostics:     Xr Shoulder Left 1 Vw  Limited evaluation with only one view submitted. Inferior subluxation of the shoulder is nonspecific but could be related to positioning versus a joint effusion. Ct Head Wo Contrast  No acute intracranial abnormality. Ct Cervical Spine Wo Contrast  Cervical spine degenerative changes. No acute traumatic findings. Ct Thoracic Spine W Contrast  No evidence for acute traumatic injury to the chest, abdomen, or pelvis. No fracture or malalignment of the thoracolumbar spine. Multiple additional chronic findings as described above. Ct Lumbar Spine Wo Contrast  No evidence for acute traumatic injury to the chest, abdomen, or pelvis.  No fracture or malalignment of the thoracolumbar spine. Multiple additional chronic findings as described above. Xr Shoulder Left (min 2 Views)  Limited due to suboptimal positioning. No evidence of acute osseous abnormality. Ct Chest Abdomen Pelvis W Contrast  No evidence for acute traumatic injury to the chest, abdomen, or pelvis. No fracture or malalignment of the thoracolumbar spine. Multiple additional chronic findings as described above. Past Medical History:     Past Medical History:   Diagnosis Date    Chronic diastolic heart failure (HCC)     Chronic kidney disease, stage III (moderate)     Coronary artery disease     Diabetes ([address]     Hyperlipidemia     Hypertension     LVH (left ventricular hypertrophy)     Pulmonary hypertension (HCC)     Right bundle branch block     Seizure disorder (HCC)     Late effects of CVA    Subarachnoid bleed (HCC)       Essential hypertension, Bell's palsy, chronic kidney disease stage III, diabetes, hyperlipidemia, coronary artery disease with stenting, cardiac stent x4, subarachnoid bleed in 2016. Past Surgical History:     No past surgical history on file.    Multiple cataract surgeries, vena cava filter, kidney removal     CARDIAC CATHETERIZATION         heart stents x 4    CATARACT REMOVAL WITH IMPLANT Left 11/01/2018     [facility]    CATARACT REMOVAL WITH IMPLANT Right 03/05/2019     [facility]    INTRACAPSULAR CATARACT EXTRACTION Right 3/5/2019     EYE CATARACT EMULSIFICATION IOL IMPLANT performed by Dwain Florez MD at [address] [facility] Left      DC XCAPSL CTRC RMVL INSJ IO LENS PROSTH W/O ECP Left 11/1/2018     EYE CATARACT EMULSIFICATION IOL IMPLANT performed by Dwain Florez MD at [facility]   12/9/15               Medications Prior to Admission:     Prior to Admission medications    Not on File   Awaiting chart conversion for med list obtained from the chart there converting to from last progress note with primary care   BD PEN NEEDLE KRISTEN U/F 32G X 4 MM MISC          simvastatin (ZOCOR) 20 MG tablet TAKE 1 TABLET NIGHTLY 90 tablet 3    APIDRA SOLOSTAR 100 UNIT/ML injection pen INJECT 15 UNITS SUBCUTANEOUSLY FOUR TIMES A DAY (BEFORE MEALS AND NIGHTLY) 15 mL 3    sodium chloride (ALTAMIST SPRAY) 0.65 % nasal spray 1 spray by Nasal route 2 times daily 1 Bottle 3    spironolactone (ALDACTONE) 25 MG tablet TAKE 1 TABLET DAILY 90 tablet 3    TRESIBA FLEXTOUCH 100 UNIT/ML SOPN Inject 70 Units into the skin nightly Patient is taking 60 units nightly 5 pen 0    levETIRAcetam (KEPPRA) 1000 MG tablet TAKE 1 TABLET TWICE A  tablet 3    Clobetasol Propionate 0.05 % LIQD Once daily for not more than 2 weeks at a time.  1 Bottle 0    acetaminophen (TYLENOL) 325 MG tablet Take 2 tablets by mouth every 6 hours as needed for Pain 40 tablet 0    Ginger, Zingiber officinalis, (GINGER PO) Take by mouth        Multiple Vitamins-Minerals (MULTIVITAMIN WOMEN PO) Take by mouth        albuterol sulfate HFA (PROVENTIL HFA) 108 (90 Base) MCG/ACT inhaler Inhale 2 puffs into the lungs every 6 hours as needed for Shortness of Breath (cough) 1 Inhaler 3    calcium carbonate (OYSTER SHELL CALCIUM 500 MG) 1250 (500 Ca) MG tablet TAKE 1 TABLET BY MOUTH ONE TIME A DAY  30 tablet 1    triamcinolone (KENALOG) 0.025 % ointment Apply 1 each topically 2 times daily as needed   1    Glucose Blood (BLOOD GLUCOSE TEST STRIPS) STRP 1 strip by Does not apply route 4 times daily Indications: Mariam strips 200 strip 3    furosemide (LASIX) 40 MG tablet Take 40 mg by mouth daily Prescribed by Dr. Rosanne Padilla (APPLE CIDER VINEGAR) TABS Take by mouth        vitamin D (ERGOCALCIFEROL) 59714 UNITS CAPS capsule TAKE 1 CAPSULE BY MOUTH ONE TIME A WEEK  4 capsule 2    clopidogrel (PLAVIX) 75 MG tablet Take 1 tablet by mouth daily 90 tablet 3    metoprolol tartrate (LOPRESSOR) 25 MG tablet Take 0.5 tablets by mouth 2 times daily Indications: per Cardiologist's order 90 tablet 3    loperamide (IMODIUM) 2 MG capsule Take 1 capsule by mouth as needed for Diarrhea 30 capsule 1    Insulin Syringe-Needle U-100 (INSULIN SYRINGE .5CC/31G\") 31G X \" 0.5 ML MISC 1 each by Does not apply route daily 100 each 3    glipiZIDE (GLUCOTROL) 10 MG tablet Take 1 tablet by mouth every morning (before breakfast) 90 tablet 3    aspirin 81 MG chewable tablet Take 1 tablet by mouth daily (Patient taking differently: Take 81 mg by mouth 2 times daily ) 30 tablet 3            Allergies:      Lantus [Insulin Glargine]         Hives,itching    Metformin And Related Diarrhea    Prednisone Swelling       Facial swelling    Sertraline Other (See Comments)       hallucination    Amoxicillin Diarrhea and Nausea And Vomiting    Eggs Or Egg-Derived Products Rash    Novolog [Insulin Aspart] Itching and Rash    Zinc Itching and Rash              Patient has no allergy information on record. Social History:     Tobacco:    has no history on file for tobacco.  Alcohol:      has no history on file for alcohol. Drug Use:  has no history on file for drug. Family History:     No family history on file. Mother-diabetes and heart disease-  Father-diabetes, hypertension, colon cancer,-  Brother-diabetes heart disease  Review of Systems:     Positive and Negative as described in HPI. Review of Systems   Reason unable to perform ROS: Patient denies multiple complaints in the review of systems but is unreliable as needed conversation show she is quite confused on factual information and events and recall/poor historian. Constitutional: Negative for activity change, appetite change, chills, diaphoresis, fatigue and fever. HENT: Negative for congestion and hearing loss. Eyes: Positive for visual disturbance (Blurring of vision unsure how long its been going on but she states it is not new).    Respiratory: hour(s))   POC Glucose Fingerstick    Collection Time: 12/02/20 10:42 AM   Result Value Ref Range    POC Glucose 25 (LL) 65 - 105 mg/dL   CK    Collection Time: 12/02/20 10:49 AM   Result Value Ref Range    Total CK 67 26 - 192 U/L   Trauma Panel    Collection Time: 12/02/20 10:49 AM   Result Value Ref Range    Ethanol <10 <10 mg/dL    Ethanol percent <0.010 <0.010 %    Blood Bank Specimen BILL FOR SERVICES PERFORMED     BUN 37 (H) 8 - 23 mg/dL    WBC 14.5 (H) 3.5 - 11.3 k/uL    RBC 4.16 3.95 - 5.11 m/uL    Hemoglobin 12.9 11.9 - 15.1 g/dL    Hematocrit 41.3 36.3 - 47.1 %    MCV 99.3 82.6 - 102.9 fL    MCH 31.0 25.2 - 33.5 pg    MCHC 31.2 28.4 - 34.8 g/dL    RDW 13.5 11.8 - 14.4 %    Platelets 078 226 - 761 k/uL    MPV 9.9 8.1 - 13.5 fL    NRBC Automated 0.0 0.0 per 100 WBC    CREATININE 1.14 (H) 0.50 - 0.90 mg/dL    GFR Non- NOT REPORTED >60 mL/min    GFR  NOT REPORTED >60 mL/min    GFR Comment NOT REPORTED     GFR Staging NOT REPORTED     Glucose 32 (LL) 70 - 99 mg/dL    hCG Qual NEGATIVE NEGATIVE    Sodium 137 135 - 144 mmol/L    Potassium 4.9 3.7 - 5.3 mmol/L    Chloride 102 98 - 107 mmol/L    CO2 28 20 - 31 mmol/L    Anion Gap 7 (L) 9 - 17 mmol/L    Protime 10.5 9.0 - 12.0 sec    INR 1.0     PTT 26.7 20.5 - 30.5 sec    pH, Jean Claude 7.273 (L) 7.320 - 7.420    pCO2, Jean Claude 64.0 (H) 39 - 55    pO2, Jean Claude 33.4 30 - 50    HCO3, Venous 28.6 24 - 30 mmol/L    Positive Base Excess, Jean Claude 0.7 0.0 - 2.0 mmol/L    Negative Base Excess, Jean Claude NOT REPORTED 0.0 - 2.0 mmol/L    O2 Sat, Jean Claude 53.9 (L) 60.0 - 85.0 %    Total Hb NOT REPORTED 12.0 - 16.0 g/dl    Oxyhemoglobin NOT REPORTED 95.0 - 98.0 %    Carboxyhemoglobin 0.7 0 - 5 %    Methemoglobin NOT REPORTED 0.0 - 1.5 %    Pt Temp 37.0     pH, Jean Claude, Temp Adj NOT REPORTED 7.320 - 7.420    pCO2, Jean Claude, Temp Adj NOT REPORTED 39 - 55 mmHg    pO2, Jean Claude, Temp Adj NOT REPORTED 30 - 50 mmHg    O2 Device/Flow/% NOT REPORTED     Respiratory Rate NOT REPORTED     Mandy Don is nonspecific but could be related to positioning versus a joint effusion. Ct Head Wo Contrast    Result Date: 12/2/2020  No acute intracranial abnormality. Ct Cervical Spine Wo Contrast    Result Date: 12/2/2020  Cervical spine degenerative changes. No acute traumatic findings. Ct Thoracic Spine W Contrast    Result Date: 12/2/2020  No evidence for acute traumatic injury to the chest, abdomen, or pelvis. No fracture or malalignment of the thoracolumbar spine. Multiple additional chronic findings as described above. Ct Lumbar Spine Wo Contrast    Result Date: 12/2/2020  No evidence for acute traumatic injury to the chest, abdomen, or pelvis. No fracture or malalignment of the thoracolumbar spine. Multiple additional chronic findings as described above. Xr Shoulder Left (min 2 Views)    Result Date: 12/2/2020  Limited due to suboptimal positioning. No evidence of acute osseous abnormality. Ct Chest Abdomen Pelvis W Contrast    Result Date: 12/2/2020  No evidence for acute traumatic injury to the chest, abdomen, or pelvis. No fracture or malalignment of the thoracolumbar spine. Multiple additional chronic findings as described above. Assessment :      Hospital Problems           Last Modified POA    Fall from chair 12/3/2020 Yes    Hypoglycemia 12/2/2020 Yes    Type 2 diabetes mellitus ([address] 12/3/2020 Yes    Coronary artery disease 12/3/2020 Yes    Chronic kidney disease, stage III (moderate) 12/3/2020 Yes    Essential hypertension 12/3/2020 Yes    Pulmonary hypertension ([address] 12/3/2020 Yes    Right bundle branch block 12/3/2020 Yes    Seizure disorder ([address] 12/3/2020 Yes    Overview Signed 12/3/2020  1:39 AM by FELICE Sullivan - CNP     Late effects of CVA         Chronic diastolic heart failure ([address] 12/3/2020 Yes          Plan:     Patient status inpatient in the Progressive Unit/Step down    1.  Monitor glucose, utilization of hypoglycemia protocol, hold all oral antidiabetic medications awaiting list of meds. Assess for causes of the hypoglycemia denies any recent medication changes  2. No acute traumatic injuries from the fall continue to monitor for syncope  3. Trend cardiac enzymes, monitor EKG- ? Heart arrhythmia? - consider cardiology consult  4. Given the elevated white blood cell count monitor for signs of infection this could be acutely reactive we will add on a procalcitonin to evaluate for infectious source. Add on urinalysis to rule out UTI  5. Monitor blood pressures and resume home medications when list is obtained and updated for ordering  6. Watch for seizures  7. No acute signs of exacerbation of CHF at this time we will continue to monitor  8. No acute chest pain given the coronary artery disease continue to trend out cardiac enzymes  9. GI prophylaxis  10. DVT prophylaxis    Consultations:   IP CONSULT TO HOSPITALIST     Patient is admitted as inpatient status because of co-morbidities listed above, severity of signs and symptoms as outlined, requirement for current medical therapies and most importantly because of direct risk to patient if care not provided in a hospital setting. Expected length of stay > 48 hours.     FELICE Bhagat - [place]  12/2/2020  8:49 PM    Copy sent to Dr. Lee Ann Hager MD

## 2020-12-03 NOTE — ED NOTES
Meal tray provided. Pt sitting up with legs dangling from edge of bed to eat. Call light with in reach.         Yassine Levine RN  12/02/20 2011

## 2020-12-03 NOTE — CARE COORDINATION
Case Management Initial Discharge Plan  Jam Ramos,             Met with:patient and called son gay (while working) to discuss discharge plans. Information verified: address, contacts, phone number, , insurance Yes    Emergency Contact/Next of Kin name & number: son & daughter in law    PCP: Andrea Alonso MD  Date of last visit: unknown    Insurance Provider: Minh Owen    Discharge Planning    Living Arrangements:    lives alone  Support Systems:   son raven & daughter in law [address] has 1 stories      Patient able to perform ADL's:Assisted    Current Services (outpatient & in home) [facility] for wound care/aide service 5 days/wk homecare . [employer] () son cares for her on weekend  DME equipment: walker, life alert      Receiving oral anticoagulation therapy? No home medications listed        Potential Assistance Needed:   24 hr care, ss consult to assess if aps referral is appropriate    Patient agreeable to home care: Yes  Freedom of choice provided:  yes    Prior SNF/Rehab Placement and Facility: [facility] long time ago  Agreeable to SNF/Rehab: No  [place] of choice provided: n/a     Evaluation: yes     Expected Discharge date:       Patient expects to be discharged to:   home  Follow Up Appointment: Best Day/ Time:  afternoon    Transportation provider: family   Transportation arrangements needed for discharge: No    Readmission Risk              Risk of Unplanned Readmission:        14             Does patient have a readmission risk score greater than 14?: No  If yes, follow-up appointment must be made within 7 days of discharge. Goals of Care: remain free from falls/injury      Discharge Plan: return home current with [facility]. [employer] son says he knows she needs 24 hr care and can privately pay for it             Electronically signed by Milton Meza RN on 12/3/20 at 6:01 PM EST

## 2020-12-03 NOTE — ED NOTES
Dr. Aleksandar Jo with cardiology at bedside and paged cath lab supervisor x2 regarding pt needing pacemaker STAT. Pt's HR 25, Dr. Dayla Phoenix maintains at bedside, Dopamine ordered.       Ricardo Cole, RN  12/03/20 [address] [id number], RN  12/03/20 1522

## 2020-12-03 NOTE — ED NOTES
[employer] message sent to cardiology regarding patient's consistent dropping of her HR   Occurring more often the past 30 min  Pt remains resting on cart, calm, nondistressed  Denies dizziness  Reports pain in RUE shoulder region              [facility]  12/03/20 [id number]

## 2020-12-03 NOTE — FLOWSHEET NOTE
SPIRITUAL CARE DEPARTMENT - [address]  PROGRESS NOTE    Shift date: 12/03/2020  Shift day: Thursday   Shift # 1    Room # 43/43     Name: Mercedes Putnam            Age: 79 y.o. Gender: female          Holiness: [address] of Judaism: [address] [place]     Referral: Rapid Response    Admit Date & Time: 12/2/2020 10:31 AM    PATIENT/EVENT DESCRIPTION:  Mercedes Putnam is a 79 y.o. female was admitted to ED 37. Rapid response was called on patient. Patient was awake and responsive when  visited. SPIRITUAL ASSESSMENT/INTERVENTION:  Patient demonstrated zulay in God and was very receptive to spiritual care. Family was not present at the time However, patient did confirm she had strong family support.  maintained listening presence, offered support, prayed with patient and reassured her that she was in good hands. Patient was very appreciative of the spiritual support she received. SPIRITUAL CARE FOLLOW-UP PLAN:  Chaplains would continue to visit for ongoing assessment of patient's condition and for more spiritual and emotional support. Electronically signed by Fr. Rebeka Butler on 12/3/2020 at 8:42 AM.  [facility]  [phone]       12/03/20 0840   Encounter Summary   Services provided to: Patient   Support System Family members   Place of [address] Visiting   (12/03/2020)   Complexity of Encounter Moderate   Length of Encounter 30 minutes   Spiritual Assessment Completed Yes   Routine   Type Follow up   Assessment Calm; Approachable   Intervention Prayer;[place]   Outcome Acceptance;Expressed gratitude   Crisis   Type Rapid response   Spiritual/Pentecostalism   Type Spiritual support

## 2020-12-03 NOTE — ED NOTES
Writer entered room and found pt bradycardic at 35. Pt non responsive to verbal and painful stimuli. Pt showing seizure like activity, locked gaze to the left and foaming at the mouth, lasting roughly 3 minutes. Dr. Vernetta Cogan and Dr. Janak Brandt responded to overhead page and at bedside assessing pt. Pt placed on lifepack, EKG done. Mallory Rivas called to initiate RAPID response team. Merline Balzarine, RN aware and at bedside. Pt is awake and responding to questions appropriately. Pt does not remember the incident.       Dmitriy Warner RN  12/03/20 7053

## 2020-12-03 NOTE — PROGRESS NOTES
Occupational Therapy Not Seen Note    DATE: 12/3/2020  Name: Arti Kent  : 1940  MRN: [id number]    Patient not available for Occupational Therapy due to:     Other: rapid response called    Next Scheduled Treatment: check back later as able or 2020    Electronically signed by MARQUES Tracy on 12/3/2020 at 8:11 AM

## 2020-12-03 NOTE — PROGRESS NOTES
[facility]  Office: [address], DO, Ruma Samuel, DO, Tiff Kaurs, DO, Davis Euceda, DO, Ciara Elizabeth MD, Soledad Spencer MD, Yana Fang MD, Denise Dixon MD, Oh Brunson MD, Jessica Schofield MD, Rosa Patel MD, Blessing Mascorro MD, Ally Gomes MD, Bill Nagel DO, Marleni Reza MD, Chasity May MD, Yaima Nielsen DO, Emmanuel Gerard MD,  Gaston Garrett DO, Yasmine Sebastian MD, Ama Grey MD, Paulino Cohn, [facility], [facility] Kyra, CNP, Lino Escalante, CNP, Jovi Hobbs, [facility], Leonel Raza, CNP, America Fisher, CNP, Alex Melissa, CNP, Candy Powell, CNP, Mir Melo, CNP, Martín Walton PA-C, Ariella Us, [facility], Jailyn Menjivar, CNP, Phillip Nails, CNP, Amrita Dominguez, CNP, Millie Cruz, CNP, Isaias Racer, [address] [place] [address]    Progress Note    12/3/2020    10:16 AM    Name:   Nati Mdcowell  MRN:     [id number]     [place]:      [de-identified]   Room:   [address] [place] [address] Day:  1  Admit Date:  12/2/2020 10:31 AM    PCP:   Harriet Jordan MD  Code Status:  Full Code    Subjective:     C/C: Syncope     Interval History Status: worsened. Patient was seen this morning after rapid response was called, patient per nursing was unresponsive with heart rates in the 20s to 30s, patient was having a lateralized gaze to the left and not responding to command. She had bowel bladder incontinence. EKG was reviewed at bedside, noted a high degree second-degree type II or third-degree block. Cardiology was consulted, patient during my examination was more alert, answering questions and does not remember to preceding events. Patient does admit to 2-3 episodes this while at home. It was initially attributed to her hypoglycemia, patient possibly may have missed some of her antiepileptic medications as well, neurology was consulted and Keppra was loaded for 1 g. And maintenance of 500 mg was continued.     Brief History:     Ning Holliday is a 79 y.o. Non-/non  female who presents with No chief complaint on file. and is admitted to the hospital for the management of fall ,acute hypoglycemia      Patient was brought to [place] [address] as a Trauma Priority secondary to a fall from her chair. Patient was brought to the emergency room after pressing her life alert button after sustaining a fall from her chair and landing face first.  EMS was activated and upon arrival to the patient's home documentation reports that the patient was found on the ground with blood sugar  to be in the 20s and she was given several oral glucose gels, as IV access was unable to be obtained, however patient become more oriented and appropriate able to provide name date of birth, repeat glucoses after the oral glucose was in the 30s and patient then was able to obtain IV access and got IV glucose and repeat glucose was 41 on arrival.       She was brought to the emergency room and trauma work-up initiated without significant findings. And patient was put on a D10 IV solution for maintenance of hypoglycemia which was stopped at the time of my evaluation as her glucose returned to normal and she was fed. Review of Systems:     Constitutional:  negative for chills, fevers, sweats  Respiratory:  negative for cough, dyspnea on exertion, shortness of breath, wheezing  Cardiovascular:  negative for chest pain, chest pressure/discomfort, lower extremity edema, palpitations  Gastrointestinal:  negative for abdominal pain, constipation, diarrhea, nausea, vomiting  Neurological:  negative for dizziness, headache    Medications: Allergies:   Allergies not on file    Current Meds:   Scheduled Meds:    LORazepam        atropine  1 mg Intravenous Once    levetiracetam  500 mg Intravenous Once    atorvastatin  10 mg Oral Daily    spironolactone  25 mg Oral Daily    levETIRAcetam  1,000 mg Oral BID    clopidogrel  75 mg Oral Daily    metoprolol tartrate  12.5 mg Oral BID    aspirin  81 mg Oral Daily    sodium chloride flush  10 mL Intravenous 2 times per day    enoxaparin  40 mg Subcutaneous Daily    insulin lispro  0-6 Units Subcutaneous TID     insulin lispro  0-3 Units Subcutaneous Nightly     Continuous Infusions:    dextrose Stopped (20 2313)    dextrose       PRN Meds: sodium chloride flush, potassium chloride **OR** potassium alternative oral replacement **OR** potassium chloride, magnesium sulfate, acetaminophen **OR** acetaminophen, polyethylene glycol, promethazine **OR** ondansetron, nicotine, glucose, dextrose, glucagon (rDNA), dextrose    Data:     Past Medical History:   has a past medical history of Chronic diastolic heart failure ([facility] Chronic kidney disease, stage III (moderate), Coronary artery disease, Diabetes ([facility] Hyperlipidemia, Hypertension, LVH (left ventricular hypertrophy), Pulmonary hypertension ([facility] Right bundle branch block, Seizure disorder ([facility] and Subarachnoid bleed ([facility] Social History:        Family History:   Family History   Problem Relation Age of Onset    Diabetes Mother     Heart Disease Mother     Diabetes Father     Hypertension Father     Colon Cancer Father     Diabetes Brother     Heart Disease Brother        Vitals:  /79   Pulse 74   Temp 98.6 °F (37 °C) (Oral)   Resp 12   Wt 195 lb (88.5 kg)   SpO2 95%   Temp (24hrs), Av.6 °F (37 °C), Min:98.6 °F (37 °C), Max:98.6 °F (37 °C)    Recent Labs     20  0220 20  0352 20  0746 20  0831   POCGLU 175* 133* 90 82       I/O (24Hr):   No intake or output data in the 24 hours ending 20 1016    Labs:  Hematology:  Recent Labs     20  1049 20  0747   WBC 14.5* 9.5   RBC 4.16 3.64*   HGB 12.9 11.4*   HCT 41.3 36.6   MCV 99.3 100.5   MCH 31.0 31.3   MCHC 31.2 31.1   RDW 13.5 13.5    222   MPV 9.9 10.5   INR 1.0 1.0     Chemistry:  Recent Labs 12/02/20  1049 12/02/20  1624  12/03/20  0003 12/03/20  0226 12/03/20  0359 12/03/20  0747     --   --   --   --   --  135   K 4.9  --   --   --   --   --  5.0     --   --   --   --   --  102   CO2 28  --   --   --   --   --  26   GLUCOSE 32*  --    < > 191  --  133 98   BUN 37*  --   --   --   --   --  32*   CREATININE 1.14*  --   --   --   --   --  1.01*   ANIONGAP 7*  --   --   --   --   --  7*   LABGLOM NOT REPORTED  --   --   --   --   --  53*   GFRAA NOT REPORTED  --   --   --   --   --  >60   CALCIUM  --   --   --   --   --   --  8.8   TROPHS  --  30*  --   --  29*  --  29*   CKTOTAL 67  --   --   --   --   --   --    MYOGLOBIN 244*  --   --   --   --   --   --     < > = values in this interval not displayed. Recent Labs     12/02/20  1049  12/03/20  0001 12/03/20  0057 12/03/20  0220 12/03/20  0352 12/03/20  0746 12/03/20  0831   TSH 12.01*  --   --   --   --   --   --   --    POCGLU  --    < > 191* 180* 175* 133* 90 82    < > = values in this interval not displayed. ABG:  Lab Results   Component Value Date    FIO2 TRAUMA 12/02/2020     No results found for: SPECIAL  No results found for: CULTURE    Radiology:  Xr Shoulder Left 1 Vw    Result Date: 12/2/2020  Limited evaluation with only one view submitted. Inferior subluxation of the shoulder is nonspecific but could be related to positioning versus a joint effusion. Ct Head Wo Contrast    Result Date: 12/2/2020  No acute intracranial abnormality. Ct Cervical Spine Wo Contrast    Result Date: 12/2/2020  Cervical spine degenerative changes. No acute traumatic findings. Xr Shoulder Left (min 2 Views)    Result Date: 12/2/2020  Limited due to suboptimal positioning. No evidence of acute osseous abnormality. Ct Chest Abdomen Pelvis W Contrast    Result Date: 12/2/2020  No evidence for acute traumatic injury to the chest, abdomen, or pelvis. No fracture or malalignment of the thoracolumbar spine.  Multiple additional chronic findings as described above. Ct Lumbar Spine Trauma Reconstruction    Result Date: 12/2/2020  No evidence for acute traumatic injury to the chest, abdomen, or pelvis. No fracture or malalignment of the thoracolumbar spine. Multiple additional chronic findings as described above. Ct Thoracic Spine Trauma Reconstruction    Result Date: 12/2/2020  No evidence for acute traumatic injury to the chest, abdomen, or pelvis. No fracture or malalignment of the thoracolumbar spine. Multiple additional chronic findings as described above. Physical Examination:        General appearance:  alert, cooperative and no distress  Mental Status:  oriented to person, place and time and normal affect  Lungs:  clear to auscultation bilaterally, normal effort  Heart:  regular rate and rhythm, no murmur  Abdomen:  soft, nontender, nondistended, normal bowel sounds, no masses, hepatomegaly, splenomegaly  Extremities:  no edema, redness, tenderness in the calves  Skin:  no gross lesions, rashes, induration    Assessment:        Hospital Problems           Last Modified POA    Hypoglycemia 12/2/2020 Yes    Type 2 diabetes mellitus ([address] 12/3/2020 Yes    Coronary artery disease 12/3/2020 Yes    Chronic kidney disease, stage III (moderate) 12/3/2020 Yes    Essential hypertension 12/3/2020 Yes    Pulmonary hypertension ([address] 12/3/2020 Yes    Right bundle branch block 12/3/2020 Yes    Seizure disorder ([address] 12/3/2020 Yes    Overview Signed 12/3/2020  1:39 AM by FELICE Nguyen - CNP     Late effects of CVA         Chronic diastolic heart failure ([address] 12/3/2020 Yes    Fall from chair 12/3/2020 Yes          Plan:        1. Loss of consciousness -multifactorial secondary to hypoglycemia, provoked seizure versus heart block. Patient currently being evaluated by neurology and cardiology. Possibly may need pacemaker. Maintain blood sugars 140s to 180s, discontinue sulfonylurea.   Await further recommendations from consultants  2. High degree heart block-cardiology following, evaluation for pacemaker. Await official consult  3. History of seizures-neurology following, history of CVA, loaded with 1 g of Keppra and continue maintenance as previous, ordered EEG  4. Hypoglycemia-likely secondary to sulfonylurea, continue D10 infusion at 50 cc an hour to maintain glucose 140s to 180s. Patient may possibly need discontinuation of medications, sulfonylurea could be an system for 2-3 additional days. 5. Status post fall at home  6. Chronic diastolic heart failure  7. Right bundle branch block  8. History of CVA  9. History of myocardial infarction  10. Pulmonary hypertension  11. Chronic kidney disease  12. Type 2 diabetes  13. Essential hypertension  14.  Hyperlipidemia    Nayeli Hernandez DO  12/3/2020  10:16 AM

## 2020-12-03 NOTE — FLOWSHEET NOTE
12/03/20 1325   Encounter Summary   Services provided to: Patient   Referral/Consult From: Multi-disciplinary team   Support System Children   Place of [address]   Contact Taoist No   Continue Visiting   (12/3/20)   Complexity of Encounter Moderate   Length of Encounter 15 minutes   Spiritual Assessment Completed Yes   Routine   Type Pre-procedure   Assessment Approachable;Tearful;Fearful   Intervention Active listening;Explored feelings, thoughts, concerns;Nurtured hope;Prayer;Sustaining presence/ Ministry of presence; Discussed belief system/Baptist practices/zulay   Outcome Acceptance;Comfort;Expressed gratitude

## 2020-12-03 NOTE — ED NOTES
Dr. Jose Pacheco at bedside, per verbal orders D10 started at 50 mL/hr and adjust to keep -140     [facility]  12/03/20 7588

## 2020-12-03 NOTE — ED NOTES
Dr. Justen Marquez, cardiology at bedside.        Dayana Abarca, RN  12/03/20 4619 Yen [place], [place]  12/03/20 5718

## 2020-12-03 NOTE — PROGRESS NOTES
Dr. Bentley Fernandes updated on patients confusion, unable to identify year and month.  No new orders placed

## 2020-12-03 NOTE — CONSULTS
[facility]    Consult / H&P               Today's Date: 12/3/2020  Patient Name: Quang Dorsey  Date of admission: 12/2/2020 10:31 AM  Patient's age: 79 y.o., 1940  Admission Dx: Hypoglycemia [E16.2]    Reason for Consult:  Cardiac evaluation    Requesting Physician: Belia Almaguer DO    CHIEF COMPLAINT:  Loss of consciousness. History Obtained From:  patient    HISTORY OF PRESENT ILLNESS:      The patient is a 79 y.o.  female who is admitted to the hospital for Syncope  Patient complains of loss of consciousness. Onset was 1 day ago. Symptoms have  resolved since that time. Patient describes the episode as a sudden loss of consciousness without warning. The patient denies general feeling of lightheadedness, headache, nausea and tachycardia/palpitations. Medications putting patient at risk for syncope: beta blockers, diuretics, hypoglycemic agents. When EMS arrived, she was found to be hypoglycemic with blood glucose at 25, improved to 30's with oral glucose. In the ED, she was placed on D 10, with improvement to 90's. Heart rate was in 50's. Blood pressure stable. Per patient, she has a history of MI in the past with stents placed. She stated that she also has an IVC filter. She does not smoke or drink. She follows with a Cardiologist, however, unsure about his name. Home medication not in the chart, however, per outside record, the patient takes zocor 20, aldactone 25, lasix 40, plavix, aspirin, lopressor 25, glipizide, 10, insulin    Past Medical History:   has a past medical history of Chronic diastolic heart failure ([address] Chronic kidney disease, stage III (moderate), Coronary artery disease, Diabetes ([address] Hyperlipidemia, Hypertension, LVH (left ventricular hypertrophy), Pulmonary hypertension ([address] Right bundle branch block, Seizure disorder ([address] and Subarachnoid bleed ([address]     Past Surgical History:   has a past surgical history that includes Cataract removal and Vena Cava Filter Placement.      Home Medications:    Prior to Admission medications    Not on File      Current Facility-Administered Medications: LORazepam (ATIVAN) 2 MG/ML injection, , ,   atropine injection 1 mg, 1 mg, Intravenous, Once  DOPamine (INTROPIN) 400 mg in dextrose 5 % 250 mL infusion, 5 mcg/kg/min, Intravenous, Continuous  dextrose 10 % infusion, , Intravenous, Continuous  atorvastatin (LIPITOR) tablet 10 mg, 10 mg, Oral, Daily  spironolactone (ALDACTONE) tablet 25 mg, 25 mg, Oral, Daily  levETIRAcetam (KEPPRA) tablet 1,000 mg, 1,000 mg, Oral, BID  clopidogrel (PLAVIX) tablet 75 mg, 75 mg, Oral, Daily  aspirin EC tablet 81 mg, 81 mg, Oral, Daily  sodium chloride flush 0.9 % injection 10 mL, 10 mL, Intravenous, 2 times per day  sodium chloride flush 0.9 % injection 10 mL, 10 mL, Intravenous, PRN  potassium chloride (KLOR-CON M) extended release tablet 40 mEq, 40 mEq, Oral, PRN **OR** potassium bicarb-citric acid (EFFER-K) effervescent tablet 40 mEq, 40 mEq, Oral, PRN **OR** potassium chloride 10 mEq/100 mL IVPB (Peripheral Line), 10 mEq, Intravenous, PRN  magnesium sulfate 1 g in dextrose 5% 100 mL IVPB, 1 g, Intravenous, PRN  acetaminophen (TYLENOL) tablet 650 mg, 650 mg, Oral, Q6H PRN **OR** acetaminophen (TYLENOL) suppository 650 mg, 650 mg, Rectal, Q6H PRN  polyethylene glycol (GLYCOLAX) packet 17 g, 17 g, Oral, Daily PRN  promethazine (PHENERGAN) tablet 12.5 mg, 12.5 mg, Oral, Q6H PRN **OR** ondansetron (ZOFRAN) injection 4 mg, 4 mg, Intravenous, Q6H PRN  nicotine (NICODERM CQ) 21 MG/24HR 1 patch, 1 patch, Transdermal, Daily PRN  enoxaparin (LOVENOX) injection 40 mg, 40 mg, Subcutaneous, Daily  insulin lispro (HUMALOG) injection vial 0-6 Units, 0-6 Units, Subcutaneous, TID WC  insulin lispro (HUMALOG) injection vial 0-3 Units, 0-3 Units, Subcutaneous, Nightly  glucose (GLUTOSE) 40 % oral gel 15 g, 15 g, Oral, PRN  dextrose 50 % IV solution, 12.5 g, Intravenous, PRN  glucagon (rDNA) normal. regular S1 and S2.  · Jugular venous pulsation Normal  · The carotid upstroke is normal in amplitude and contour without delay or bruit  · Peripheral pulses are symmetrical and full   Abdomen:   · No masses or tenderness  · Bowel sounds present  Extremities:  ·  No Cyanosis or Clubbing  ·  Lower extremity edema: yes  ·  Skin: Warm and dry  Neurological:  · Alert and oriented. · Moves all extremities well  · No abnormalities of mood, affect, memory, mentation, or behavior are noted    DATA:    Diagnostics:    EKG: Mobitz type 2, converted to Complete AV block. RBBB  ECHO: obtained and reviewed. Stress Test: not obtained. Cardiac Angiography: not obtained. Labs:     CBC:   Recent Labs     12/02/20  1049 12/03/20  0747   WBC 14.5* 9.5   HGB 12.9 11.4*   HCT 41.3 36.6    222     BMP:   Recent Labs     12/02/20  1049  12/03/20  0747 12/03/20  1138 12/03/20  1320     --  135  --   --    K 4.9  --  5.0  --   --    CO2 28  --  26  --   --    BUN 37*  --  32*  --   --    CREATININE 1.14*  --  1.01*  --   --    LABGLOM NOT REPORTED  --  53*  --   --    GLUCOSE 32*   < > 98 92 183    < > = values in this interval not displayed. PT/INR:   Recent Labs     12/02/20  1049 12/03/20  0747   PROTIME 10.5 10.6   INR 1.0 1.0     APTT:  Recent Labs     12/02/20  1049   APTT 26.7     CARDIAC ENZYMES:  Recent Labs     12/02/20  1049   CKTOTAL 79     FASTING LIPID PANEL:  IMPRESSION:      1. Syncope 2/2 Complete AV block vs hypoglycemia  2. RBBB  3. Hypertension  4. Dm II  5. Hyperlipidemia  6. H/o CAD (per patient; no records)  7. Seizure disorder      RECOMMENDATIONS:  1. Emergent temporary pacemaker today. EP consulted for evaluation. 2. Hold BB/CCB an other AV blocking agents  3. Obtain records of previous cardiac workup  4. Will follow      Discussed with patient and Nurse.        Mini Merino MD  PGY-3, Internal medicine resident  [address] [place] [address]  12/3/2020 1:44 PM Attending Physician Statement  I have discussed the case of Karin Velázquez including pertinent history and exam findings with the resident. I have seen and examined the patient and the key elements of the encounter have been performed by me. I agree with the assessment, plan and orders as documented by the resident With changes made to the note.      Electronically signed by Rosamond Scheuermann, MD on 12/3/2020 at 4:53 PM.    [facility]      [phone]

## 2020-12-04 PROBLEM — I44.2 HEART BLOCK AV THIRD DEGREE (HCC): Status: ACTIVE | Noted: 2020-01-01

## 2020-12-04 NOTE — PROGRESS NOTES
[facility]  Office: [address], DO, Susie [place], DO, Cody Cai, DO, Joana Wishon Blood, DO, Amari Christopher MD, Chetan Tinoco MD, Carlos Morton MD, Cornell Mims MD, Man Tom MD, Rachael Sethi MD, Preston Corrigan MD, Jaison Conde MD, Mbonu Cassandria Favre, MD, Rebecca Denney DO, Danyell Nugent MD, Mira Parikh MD, Marisela Bolanos, DO, Wil Quarles MD,  Saintclair Lank, DO, Lizz Culp MD, Angela Arango MD, Daryle Springer, [facility], [facility] Kyra, CNP, Dennis Barksdale, CNP, Vaibhav Kendall, CNS, Clara Delgadillo, CNP, Ger Valencia, CNP, Ciara Batista, CNP, Kamaljit Kim, CNP, Sterling Alvarenga, CNP, Maryjo Currie PA-C, Arnol Anderson [facility], Madeleine Betancourt, CNP, Chace Zuniga, CNP, Kev Paige, CNP, Erinn Rubio, CNP, Melany Pattons, [address] [place] [address]    Progress Note    12/4/2020    9:44 AM    Name:   Alejandro Muñoz  MRN:     [id number]     [place]:      [de-identified]   Room:   1007/1007-01   Day:  2  Admit Date:  12/2/2020 10:31 AM    PCP:   Sanjeev Sykes MD  Code Status:  Full Code    Subjective:     C/C: Syncope     Interval History Status: worsened. Patient seen and examined, complaining of right shoulder pain, spoke with cardiology fellow at bedside, plan for pacemaker today. Question regarding cardiac catheterization in the past, patient is unable to remember which hospital he was at. Brief History:     Alejandro Muñoz is a 79 y.o. Non-/non  female who presents with No chief complaint on file. and is admitted to the hospital for the management of fall ,acute hypoglycemia      Patient was brought to [place] [address] as a Trauma Priority secondary to a fall from her chair.     Patient was brought to the emergency room after pressing her life alert button after sustaining a fall from her chair and landing face first.  EMS was activated and upon arrival to the patient's home documentation reports that the patient was found on the ground with blood sugar  to be in the 20s and she was given several oral glucose gels, as IV access was unable to be obtained, however patient become more oriented and appropriate able to provide name date of birth, repeat glucoses after the oral glucose was in the 30s and patient then was able to obtain IV access and got IV glucose and repeat glucose was 41 on arrival.       She was brought to the emergency room and trauma work-up initiated without significant findings. And patient was put on a D10 IV solution for maintenance of hypoglycemia which was stopped at the time of my evaluation as her glucose returned to normal and she was fed. Review of Systems:     Constitutional:  negative for chills, fevers, sweats  Respiratory:  negative for cough, dyspnea on exertion, shortness of breath, wheezing  Cardiovascular:  negative for chest pain, chest pressure/discomfort, lower extremity edema, palpitations  Gastrointestinal:  negative for abdominal pain, constipation, diarrhea, nausea, vomiting  Neurological:  negative for dizziness, headache    Medications:      Allergies:  No Known Allergies    Current Meds:   Scheduled Meds:    vancomycin  1,000 mg Intravenous Once    vancomycin irrigation  1,000 mg Irrigation Once    atropine  1 mg Intravenous Once    atorvastatin  10 mg Oral Daily    levETIRAcetam  1,000 mg Oral BID    clopidogrel  75 mg Oral Daily    aspirin  81 mg Oral Daily    sodium chloride flush  10 mL Intravenous 2 times per day    enoxaparin  40 mg Subcutaneous Daily    insulin lispro  0-6 Units Subcutaneous TID WC    insulin lispro  0-3 Units Subcutaneous Nightly     Continuous Infusions:    dextrose      dextrose 100 mL/hr at 12/03/20 1144    dextrose       PRN Meds: glucose, dextrose, glucagon (rDNA), dextrose, sodium chloride flush, potassium chloride **OR** potassium alternative oral replacement **OR** potassium chloride, GLUCOSE 32*   < > 98 92 183  --  114*  --    BUN 37*  --  32*  --   --   --  29*  --    CREATININE 1.14*  --  1.01*  --   --   --  1.04*  --    MG  --   --   --   --   --   --  2.0  --    ANIONGAP 7*  --  7*  --   --   --  11  --    LABGLOM NOT REPORTED  --  53*  --   --   --  51*  --    GFRAA NOT REPORTED  --  >60  --   --   --  >60  --    CALCIUM  --   --  8.8  --   --   --  8.3*  --    PHOS  --   --   --   --   --   --  2.8  --    TROPHS  --    < > 29*  --   --  49* 50* 38*   CKTOTAL 67  --   --   --   --   --   --   --    MYOGLOBIN 244*  --   --   --   --   --   --   --     < > = values in this interval not displayed. Recent Labs     12/02/20  1049  12/03/20  1138 12/03/20  1319 12/03/20  1620 12/03/20  2133 12/04/20  0051 12/04/20  0227 12/04/20  0745   LABALBU  --   --   --   --   --   --  2.6*  --   --    TSH 12.01*  --   --   --   --   --   --   --   --    POCGLU  --    < > 92 183* 168* 109*  --  92 66    < > = values in this interval not displayed. ABG:  Lab Results   Component Value Date    FIO2 TRAUMA 12/02/2020     Lab Results   Component Value Date/Time    SPECIAL NOT REPORTED 12/03/2020 04:53 AM     Lab Results   Component Value Date/Time    CULTURE ESCHERICHIA COLI >166411 CFU/ML (A) 12/03/2020 04:53 AM       Radiology:  Zonia Machuca Shoulder Left 1 Vw    Result Date: 12/2/2020  Limited evaluation with only one view submitted. Inferior subluxation of the shoulder is nonspecific but could be related to positioning versus a joint effusion. Ct Head Wo Contrast    Result Date: 12/2/2020  No acute intracranial abnormality. Ct Cervical Spine Wo Contrast    Result Date: 12/2/2020  Cervical spine degenerative changes. No acute traumatic findings. Xr Shoulder Left (min 2 Views)    Result Date: 12/2/2020  Limited due to suboptimal positioning. No evidence of acute osseous abnormality.      Ct Chest Abdomen Pelvis W Contrast    Result Date: 12/2/2020  No evidence for acute traumatic injury to the chest, abdomen, or pelvis. No fracture or malalignment of the thoracolumbar spine. Multiple additional chronic findings as described above. Ct Lumbar Spine Trauma Reconstruction    Result Date: 12/2/2020  No evidence for acute traumatic injury to the chest, abdomen, or pelvis. No fracture or malalignment of the thoracolumbar spine. Multiple additional chronic findings as described above. Ct Thoracic Spine Trauma Reconstruction    Result Date: 12/2/2020  No evidence for acute traumatic injury to the chest, abdomen, or pelvis. No fracture or malalignment of the thoracolumbar spine. Multiple additional chronic findings as described above. Physical Examination:        General appearance:  alert, cooperative and no distress  Mental Status:  oriented to person, place and time and normal affect  Lungs:  clear to auscultation bilaterally, normal effort  Heart:  regular rate and rhythm, no murmur  Abdomen:  soft, nontender, nondistended, normal bowel sounds, no masses, hepatomegaly, splenomegaly  Extremities: Some bruising and coolness of upper extremities, left greater than right. Pulses are diminished but intact  Skin:  no gross lesions, rashes, induration    Assessment:        Hospital Problems           Last Modified POA    Hypoglycemia 12/2/2020 Yes    Type 2 diabetes mellitus ([address] 12/3/2020 Yes    Coronary artery disease 12/3/2020 Yes    Chronic kidney disease, stage III (moderate) 12/3/2020 Yes    Essential hypertension 12/3/2020 Yes    Pulmonary hypertension ([address] 12/3/2020 Yes    Right bundle branch block 12/3/2020 Yes    Seizure disorder ([address] 12/3/2020 Yes    Overview Signed 12/3/2020  1:39 AM by FELICE Chowdhury - CNP     Late effects of CVA         Chronic diastolic heart failure ([address] 12/3/2020 Yes    Fall from chair 12/3/2020 Yes          Plan:        1. Loss of consciousness -patient still hypoglycemic.   Loss of consciousness likely secondary to heart block, plan for pacemaker today with cardiology  2. High degree heart block-cardiology following  3. History of seizures-neurology following,  4. Hypoglycemia-continues to be hypoglycemic this morning, maintain D10 drip until after surgery, check A1C  5. Chronic diastolic heart failure  6. Right bundle branch block  7. History of CVA  8. History of myocardial infarction  9. Pulmonary hypertension  10. Chronic kidney disease  11. Type 2 diabetes  12. Essential hypertension  13.  Hyperlipidemia    Bette Doyle DO  12/4/2020  9:44 AM

## 2020-12-04 NOTE — CARE COORDINATION
Consult received to assess if APS referral is appropriate s/p fall with alt mental status, lives alone, has home care    Chart reviewed  Noted pt has [facility] (MWF for wound care)and [facility] arranged by [facility] (5 days/week, 9-5) and son cares for her on weekends  CM spoke with son yest about SNF and he declined and planned to privately pay for 24 hr care  However, noted dtr in law called CM this am and stated they feel pt is unsafe to be alone d/t her falls, confusion and inaccurate med administration and plan to pursue SNF  Therefore, APS is not warranted at this time as pt has a safe discharge plan as will be going to a SNF  CM updated

## 2020-12-04 NOTE — PLAN OF CARE
Nutrition Problem #1: Increased nutrient needs  Intervention: Food and/or Nutrient Delivery: Modify Current Diet, Start Oral Nutrition Supplement  Nutritional Goals: Pt to meet % of est'd needs via PO

## 2020-12-04 NOTE — PROGRESS NOTES
Spoke with cardiology fellow regarding low BP, pt given 500 ml bolus, bp slightly improved, he stated to continue to monitor and as long as bp is remaining SBP 90, it is okay.

## 2020-12-04 NOTE — PROGRESS NOTES
Comprehensive Nutrition Assessment    Type and Reason for Visit:  Initial, Positive Nutrition Screen(Wounds)    Nutrition Recommendations/Plan:   -Recommend low K, QUENTIN diet   -Suggest Nepro supplements BID   -Please obtain actual wt as able   -Will monitor po intake, weights and wound healing     Nutrition Assessment:   Pt admitted d/t a fall 2/2 hypoglycemia. Pt NPO this morning for PPM placement. Pt is now restarted on a PO diet. No wt hx in EMR. Pt noted w/ chest wound - will add nutritional supplements to compliment po intake and to aide in wound healing progression. Will monitor. Malnutrition Assessment:  Malnutrition Status:  Insufficient data    Context:  Acute Illness     Findings of the 6 clinical characteristics of malnutrition:  Energy Intake:  Unable to assess  Weight Loss:  Unable to assess     Body Fat Loss:  Unable to assess     Muscle Mass Loss:  Unable to assess    Fluid Accumulation:  7 - Moderate to Severe Generalized, Extremities   Strength:  Not Performed    Estimated Daily Nutrient Needs:  Energy (kcal):  1.2-1.3 ~> 8662-8950 kcals/d; Weight Used for Energy Requirements:  Current     Protein (g):  1.2-1.4 gm/kg ~>60-70 gms/d; Weight Used for Protein Requirements:  Ideal          Nutrition Related Findings:  K 5.4, glucose 163      Wounds:  (chest wound)       Current Nutrition Therapies:    DIET CARDIAC;    Additional calories: D10% @ 50 mL/hr ~> 408 kcals/d     Anthropometric Measures:  · Height: 5' 2\" (157.5 cm)  · Current Body Weight: 195 lb (88.5 kg)(stated)   · Ideal Body Weight: 110 lbs; % Ideal Body Weight 177.3 %   · BMI: 35.7  · BMI Categories: Obese Class 2 (BMI 35.0 -39.9)       Nutrition Diagnosis:   · Increased nutrient needs related to (wound healing) as evidenced by wounds(Need for ONS)      Nutrition Interventions:   Food and/or Nutrient Delivery:  Modify Current Diet, Start Oral Nutrition Supplement  Nutrition Education/Counseling:  Education not indicated Coordination of Nutrition Care:  Continue to monitor while inpatient    Goals: Set   Pt to meet % of est'd needs via PO       Nutrition Monitoring and Evaluation:   Food/Nutrient Intake Outcomes:  Food and Nutrient Intake, Supplement Intake  Physical Signs/Symptoms Outcomes:  Biochemical Data, Nutrition Focused Physical Findings, Skin, Weight, GI Status, Fluid Status or Edema     Discharge Planning:     Too soon to determine     Electronically signed by Wilian Pete RD, LD on 12/4/20 at 3:07 PM EST    Contact: [phone]

## 2020-12-04 NOTE — PROCEDURES
[facility]                Procedure Note    Dual Chamber Pacemaker         Pete Panda (79 y.o., female)  1940      12/4/2020      Procedure: PPM    Operators:  Primary: Dr Tano Muñiz  Assistant/ CV Fellow: Bill Nelson     Indication: Complete heart block    Pre Procedure Conscious Sedation Data:    ASA Class:    [] I [x] II [] III [] IV    Mallampati Class:  [] I [x] II [] III [] IV        / Device Data:        Name    Medtronic JAJA Chase # Serial #   Pacer/ICD/Bi-V [id number]   RA-Lead [id number]   RV-Lead [id number]       ATRIUM R VENT   P waves 3 mV R waves: 8 mV   Threshold: 0.5*2.7 Threshold: 0.5*2.3   Impedance: 613 Impedance: 1726       · [x] Sedation monitoring  · [x] Left Subclavian Angiogram   · [x] RV lead   · [x] RA lead   · [] LV lead   · [x] Intra - OP Lead Testing  · [] Coronary Sinus Angiogram   · [x] Pocket   · [x] Generators Implant  · [x] Fluoro time: 6.5 min      Procedure  After the usual preparation of the left neck and chest, the patient was draped in the usual sterile manner. Local anesthesia was infused below the left clavicle from the midline laterally. An incision was made inferior and parallel to the clavicle. The incision was carried down to the fascia. A pocket was formed inferior using blunt dissection. A thin walled 18 gauge needle was used to puncture the left subclavian vein using the modified Seldinger technique. A guide wire was passed into the right heart under fluoroscopic control. This was repeated with a second guide wire. RV Lead Implant:  A 9.0 Croatian sheath was then passed over the guide wire and the guide wire removed. The ventricular lead was advanced through the sheath into the right heart. The lead was then positioned in the right ventricle under fluoroscopic control.   The acute pacing and sensing thresholds were measured and found to be satisfactory. RA Lead Implant:  A second 7.0 Egyptian sheath was then passed over the guide wire and the guide wire removed. The atrial lead was advanced into the right heart. The lead was then positioned in the right atrium. The acute pacing and sensing thresholds were measured and found to be acceptable. The two sheaths were removed and the leads were secured to the fascia. Generator: The implanted leads were attached to the pacemaker / AICD using the setscrews. The pocket was irrigated with antibiotic solution. The pulse generator and leads were coiled and placed in the pocket. Fluoroscopy was used to verify the final placement of the pacemaker and leads. The pocket was closed using multiple layers of suture and a dry sterile dressing was applied. There were no complications, patient tolerated the procedure well. The patient left the EP lab in stable condition. Impression / Device:  Successful Implantation of: Pacemaker   Estimated blood loss less than 25 ml    Plan:  Telemetry monitoring  Interigate pacemaker before discharge  CXR if needed  Wound check at [facility] in 7days  Discharge if patient remains stable    Patient instructed to no showering or get the wound wet for 1 week, no driving for 1 week, no lifting more than 10 pounds for 4 weeks, no arm raising above the shoulder for 4 weeks. No lovenox or heparin at any dose is to be given    Electronically signed by Hailey Bryson MD on 12/4/2020 at 12:12 PM        I have reviewed the case / procedure with resident / fellow  I have examined the patient personally  Patient agree with treatment plan as discussed before, final arrangement based on my evaluation and exam.    Risk and benefit of procedure planned were explained in details. Procedure was performed by me personally, with all aspect of the procedure being done using standard protocol. Note was modified based on my own assessment and treatment.     Yoon Guido MD  [facility]

## 2020-12-04 NOTE — PROGRESS NOTES
Occupational Therapy Not Seen Note    DATE: 2020  Name: Elgin Cornejo  : 1940  MRN: [id number]    Patient not available for Occupational Therapy due to: Other: Pt on bedrest per RN. Pt with temporary pacemaker.     Next Scheduled Treatment: 2020    Electronically signed by MARQUES Johnson on 2020 at 9:26 AM

## 2020-12-04 NOTE — PROGRESS NOTES
abnormality  Neck: no JVD  Lungs: clear to auscultation bilaterally, no basilar rales, no wheezing   Heart: regular rate and rhythm, S1, S2 normal, no murmur, click, rub or gallop  Abdomen: soft, non-tender; bowel sounds normal  Extremities: No LE edema  Neurologic: Mental status: Alert, oriented. Motor and sensory not done. ECHO pending   Cath: As per patient she had stents placed 4-5 yrs ago   Also has IVC filter           1. Complete heart block - Had temporary pacemaker placed yesterday. 2.  Syncopal episodes- Neurology following for syncopal episode. 3.  Coronary artery disease status post stents. As per patient she had stents placed 5 years ago. 4.  IVC filter  5. Type 2 DM   6. Hyperkalemia K -5.4    Plan of Treatment:   1. Patient had temporary pacemaker yesterday. Was seen by EP. Will need permanent pacemaker. 2.   2D echocardiography pending. 3.    aspirin and Plavix for previous stent. No record for previous stent available. Will try to get records. As per     Patient will need permanent pacemaker for complete heart block. Risks and benefits of the procedure explained to the patient. Risk includes infection, pneumothorax, bleeding required blood transfusion and death. Patient agreed for the procedure. All findings were discussed with patient and her son. Discussed with patient and nursing. Roshni Quiñonez MD  Fellow, Cardiovascular Diseases   [facility] [facility]   Pager - [phone]    Attending Physician Statement  I have discussed the case of Lucita Higginbotham including pertinent history and exam findings with the resident. I have seen and examined the patient and the key elements of the encounter have been performed by me. I agree with the assessment, plan and orders as documented by the resident With changes made to the note.      Electronically signed by Marylee Borg, MD on 12/4/2020 at 1:32 PM.    [facility]      [phone]

## 2020-12-04 NOTE — PROGRESS NOTES
Spoke with the patient's son Jarvis Stahl ([phone]) to clarify the patient's Cardiac history. He stated she did have cardiac stents placed about 5-6 years ago. He also mentioned that she did have an IVC filter placed at some point but unsure when and for what reason. He did not have her medication list at hand and was unsure about her exact medications. Also spoke with the son about the patient's need for PPM. He agreed and gave his consent.        Maximo Cao MD  PGY-3, Internal medicine resident  [place], [place]  12/4/2020 9:10 AM

## 2020-12-05 NOTE — PLAN OF CARE
Problem: Skin Integrity:  Goal: Will show no infection signs and symptoms  Description: Will show no infection signs and symptoms  Outcome: Completed  Goal: Absence of new skin breakdown  Description: Absence of new skin breakdown  Outcome: Completed     Problem: Falls - Risk of:  Goal: Will remain free from falls  Description: Will remain free from falls  Outcome: Completed  Goal: Absence of physical injury  Description: Absence of physical injury  Outcome: Completed     Problem: Nutrition  Goal: Optimal nutrition therapy  Description: Nutrition Problem #1: Increased nutrient needs  Intervention: Food and/or Nutrient Delivery: Modify Current Diet, Start Oral Nutrition Supplement  Nutritional Goals: Pt to meet % of est'd needs via PO     Outcome: Completed     Problem: Musculor/Skeletal Functional Status  Goal: Highest potential functional level  Outcome: Completed  Goal: Absence of falls  Outcome: Completed

## 2020-12-05 NOTE — PROGRESS NOTES
Patient discharged to [facility] [place]. IV access removed. Report given to facility at 1700. Transported via [place].

## 2020-12-05 NOTE — PROGRESS NOTES
assistance    Subjective   General  Patient assessed for rehabilitation services?: Yes  Family / Caregiver Present: No  General Comment  Comments: RN ok'd for therapy visit this date. Pt agreeable to session, pleasent/cooperative throughout. Pt became tearful during session reporting being upset she is now alone after her husbands passing 3 yrs ago. Pt provided emotional support by writer and PCA. Patient Currently in Pain: No  Vital Signs  Patient Currently in Pain: No     Social/Functional History  Social/Functional History  Lives With: Alone  Type of Home: House  Home Layout: One level  Home Access: Ramped entrance  Bathroom Shower/Tub: Walk-in shower  Bathroom Toilet: Handicap height  Bathroom Equipment: Shower chair, Grab bars in [address]: 4 wheeled walker, Lift chair, Alert [employer] Help From: Personal care attendant  ADL Assistance: Needs assistance  Homemaking Assistance: Needs assistance  Homemaking Responsibilities: No  Ambulation Assistance: Independent  Transfer Assistance: Independent  Active : No  Additional Comments: Pt states she has an aid from 9 to 5 M-F. Needs help with dressing but will bath on her own while someone is at the house. Occasionally sleeps in lift chair. Objective   Vision: Within Functional Limits  Hearing: Within functional limits      Orientation  Overall Orientation Status: Within Functional Limits     Balance  Sitting Balance: Contact guard assistance(CGA for static/dynamic sitting on recliner and toilet ~20 minutes. Pt required increased time sitting on toilet.)  Standing Balance: Moderate assistance(Mod A upon first standing and progressed to Min A.)  Standing Balance  Time: ~4 minutes  Activity: functional mobility to/from bathroom. Pt recliner placed at entrance of bathroom to decreased required distance.     Functional Mobility  Functional - Mobility Device: Rolling Walker  Activity: To/from bathroom  Functional Mobility Comments: Pt 5/5                   Plan   Plan  Times per week: 3-4x/week  Current Treatment Recommendations: Safety Education & Training, Balance Training, Patient/Caregiver Education & Training, Self-Care / ADL, Functional Mobility Training, Equipment Evaluation, Education, & procurement, Endurance Training      AM-PAC Score        AM-PAC Inpatient Daily Activity Raw Score: 15 (12/05/20 1516)  AM-PAC Inpatient ADL T-Scale Score : 34.69 (12/05/20 1516)  ADL Inpatient CMS 0-100% Score: 56.46 (12/05/20 1516)  ADL Inpatient CMS G-Code Modifier : CK (12/05/20 1516)    Goals  Short term goals  Time Frame for Short term goals: By discharge, pt will:  Short term goal 1: Complete functional transfers with Mod I, use of LRD  Short term goal 2: Complete functional mobility with Mod I, use of LRD  Short term goal 3: Complete UB/grooming ADLs with Mod I, setup provided and use of DME/adaptive techniques PRN  Short term goal 4: Complete LB ADLs with Min A, setup provided and use of DME/adaptive techniques PRN  Short term goal 5: Demonstrate +8 minutes of standing balance to increase engagement in ADLs/IADLs       Therapy Time   Individual Concurrent Group Co-treatment   Time In 1217         Time Out 1300         Minutes 43         Timed Code Treatment Minutes: 30 Minutes       SANDRA Valladares/L

## 2020-12-05 NOTE — PLAN OF CARE
Problem: Skin Integrity:  Goal: Will show no infection signs and symptoms  Description: Will show no infection signs and symptoms  12/4/2020 2224 by Miroslava Kothari RN  Outcome: Ongoing  12/4/2020 1037 by Eliseo Painter RN  Outcome: Ongoing  Goal: Absence of new skin breakdown  Description: Absence of new skin breakdown  12/4/2020 2224 by Miroslava Kothari RN  Outcome: Ongoing  12/4/2020 1037 by Eliseo Painter RN  Outcome: Ongoing     Problem: Falls - Risk of:  Goal: Will remain free from falls  Description: Will remain free from falls  12/4/2020 2224 by Miroslava Kothari RN  Outcome: Ongoing  12/4/2020 1037 by Eliseo Painter RN  Outcome: Ongoing  Goal: Absence of physical injury  Description: Absence of physical injury  12/4/2020 2224 by Miroslava Kothari RN  Outcome: Ongoing  12/4/2020 1037 by Eliseo Painter RN  Outcome: Ongoing     Problem: Nutrition  Goal: Optimal nutrition therapy  Description: Nutrition Problem #1: Increased nutrient needs  Intervention: Food and/or Nutrient Delivery: Modify Current Diet, Start Oral Nutrition Supplement  Nutritional Goals: Pt to meet % of est'd needs via PO     Outcome: Ongoing

## 2020-12-05 NOTE — DISCHARGE INSTR - COC
Continuity of Care Form    Patient Name: Divya Medina   :  1940  MRN:  [id number]    Admit date:  2020  Discharge date:  ***    Code Status Order: Full Code   Advance Directives:   Advance Care Flowsheet Documentation       Date/Time Healthcare Directive Type of Healthcare Directive Copy in [address] Agent's Name Healthcare Agent's Phone Number    20 1523  No, patient does not have an advance directive for healthcare treatment -- -- -- -- --            Admitting Physician:  Yamilet Davis DO  PCP: Devonte Banegas MD    Discharging Nurse: [facility] Unit/Room#: 1007/1007-01  Discharging Unit Phone Number: ***    Emergency Contact:   Extended Emergency Contact Information  Primary Emergency Contact: Gerson Ricci Phone: [phone]  Relation: Child  Secondary Emergency Contact: Abril Ricci Dr Phone: [phone]  Relation: Child    Past Surgical History:  Past Surgical History:   Procedure Laterality Date    CATARACT REMOVAL      Multiple surgeries    VENA CAVA FILTER PLACEMENT         Immunization History: There is no immunization history on file for this patient. Active Problems:  Patient Active Problem List   Diagnosis Code    Hypoglycemia E16.2    Type 2 diabetes mellitus (HCC) E11.9    Coronary artery disease I25.10    Chronic kidney disease, stage III (moderate) N18.30    Essential hypertension I10    Hyperlipidemia E78.5    LVH (left ventricular hypertrophy) I51.7    Pulmonary hypertension (HCC) I27.20    Right bundle branch block I45.10    Seizure disorder (HCC) G40.909    Chronic diastolic heart failure (HCC) I50.32    Fall from chair W07. Johny Sarna    Heart block AV third degree ([facility] I44.2       Isolation/Infection:   Isolation            No Isolation          Patient Infection Status       None to display            Nurse Assessment:  Last Vital Signs: /61   Pulse 94   Temp 97.7 °F (36.5 °C) (Oral)   Resp 21   Ht 5' 2\" (1.575 m)   Wt 274 lb 14.6 oz (124.7 kg)   SpO2 94%   BMI 50.28 kg/m²     Last documented pain score (0-10 scale): Pain Level: 0  Last Weight:   Wt Readings from Last 1 Encounters:   12/05/20 274 lb 14.6 oz (124.7 kg)     Mental Status:  Oriented to self    IV Access:  - None    Nursing Mobility/ADLs:  Walking   Assisted  Transfer  Assisted  Bathing  Assisted  Dressing  Assisted  Toileting  Assisted  Feeding  Independent  Med Admin  Assisted  Med Delivery   whole    Wound Care Documentation and Therapy:        Elimination:  Continence:   · Bowel: Yes  · Bladder: Yes  Urinary Catheter: None   Colostomy/Ileostomy/Ileal Conduit: No       Date of Last BM: Before admission    Intake/Output Summary (Last 24 hours) at 12/5/2020 1215  Last data filed at 12/5/2020 0300  Gross per 24 hour   Intake 250 ml   Output 1500 ml   Net -1250 ml     I/O last 3 completed shifts: In: 250 [P.O.:240; I.V.:10]  Out: 1500 [Urine:1500]    Safety Concerns:     History of Falls (last 30 days), At Risk for Falls and History of Seizures    Impairments/Disabilities:      None    Nutrition Therapy:  Current Nutrition Therapy:   - Oral Diet:  Low Sodium (3-4gm) and low potassium    Routes of Feeding: Oral  Liquids: No Restrictions  Daily Fluid Restriction: no  Last Modified Barium Swallow with Video (Video Swallowing Test): not done    Treatments at the Time of Hospital Discharge:   Respiratory Treatments: ***  Oxygen Therapy:  is not on home oxygen therapy. Ventilator:    - No ventilator support    Rehab Therapies: Physical Therapy and Occupational Therapy  Weight Bearing Status/Restrictions: Patient is not to lift arm over head until 12/12/2020. Arm should stay in sling.   Other Medical Equipment (for information only, NOT a DME order):  walker  Other Treatments:    Patient's personal belongings (please select all that are sent with patient):  Clothing    RN SIGNATURE:  Electronically signed by Faviola Tom RN on 12/5/20 at 1:12 PM EST    CASE MANAGEMENT/SOCIAL WORK SECTION    Inpatient Status Date: ***    Readmission Risk Assessment Score:  Readmission Risk              Risk of Unplanned Readmission:        17           Discharging to Facility/ Agency   · Name: [facility]  Address:   [address] [place] [address] [address] [address]         Phone: [phone]        · Fax:    Dialysis Facility (if applicable)   · Name:  · Address:  · Dialysis Schedule:  · Phone:  · Fax:    / signature: Electronically signed by Nithya Palafox RN on 12/5/20 at 4:07 PM EST    PHYSICIAN SECTION    Prognosis: Good    Condition at Discharge: Stable    Rehab Potential (if transferring to Rehab): Good    Recommended Labs or Other Treatments After Discharge: See followup    Physician Certification: I certify the above information and transfer of Karin Velázquez  is necessary for the continuing treatment of the diagnosis listed and that she requires [place] for greater 30 days.      Update Admission H&P: No change in H&P    PHYSICIAN SIGNATURE:  Electronically signed by Nayeli Hernandez DO on 12/5/20 at 2:39 PM EST

## 2020-12-05 NOTE — CARE COORDINATION
Spoke with son, Meka Watson, to obtain SS#. Call to Qi at [facility] to update her on SS#. Felix discussing with pt over phone about going to [facility] once discharged from hospital. Pt is tearful. They are able to accept pt when she is ready for discharge. Will update Qi when more info is available    21  after long discussion with pt, she is agreeable to [facility]. Nely Shields at [facility] to set up transport for [address] [place] [address] to update her on discharge. No paperwork needs faxed to [facility].  Pt updated on transport time    Discharge [facility] Case Management Department  Written by: Yday Fields RN    Patient Name: Mekhi Chapinuthuma  Attending Provider: Rico Kohler DO  Admit Date: 2020 10:31 AM  MRN: [id number]  Account: [de-identified]                     : 1940  Discharge Date: 2020      Disposition: [facility], [address] [place] [address]

## 2020-12-05 NOTE — DISCHARGE INSTR - OTHER ORDERS
your medicines. He or she will also give you instructions about taking any new medicines.     · If you take aspirin or some other blood thinner, ask your doctor if and when to start taking it again. Make sure that you understand exactly what your doctor wants you to do.     · Be safe with medicines. Read and follow all instructions on the label. ? If the doctor gave you a prescription medicine for pain, take it as prescribed. ? If you are not taking a prescription pain medicine, ask your doctor if you can take an over-the-counter medicine.     · If your doctor prescribed antibiotics, take them as directed. Do not stop taking them just because you feel better. You need to take the full course of antibiotics. Incision care    · If you have strips of tape on the cut (incision) the doctor made, leave the tape on for a week or until it falls off.     · You may shower 24 to 48 hours after surgery. Pat the incision dry. Don't swim or take a bath for the first 2 weeks, or until your doctor tells you it is okay.     · You will have a dressing over the incision. A dressing helps the incision heal and protects it. Your doctor will tell you how to take care of this. Other instructions    · Keep a medical ID card with you at all times that says you have a heart device. You'll get an updated one with information about your new device. The card should include the  and model information.     · Wear medical alert jewelry stating that you have the device. You can buy this at most drugstores.     · Check your pulse as directed by your doctor.     · Have your device checked as often as your doctor recommends. In some cases, this may be done over the phone or online. Your doctor will give you instructions about how to do this. Follow-up care is a key part of your treatment and safety. Be sure to make and go to all appointments, and call your doctor if you are having problems.  It's also a good idea to know your test results and keep a list of the medicines you take. When should you call for help? Call 911 anytime you think you may need emergency care. For example, call if:    · You passed out (lost consciousness).     · You have trouble breathing. Call your doctor now or seek immediate medical care if:    · You are dizzy or lightheaded, or you feel like you may faint.     · You have pain that does not get better after you take pain medicine.     · You hear an alarm or feel a vibration from your heart device.     · You have loose stitches, or your incision comes open.     · Bright red blood has soaked through the bandage over your incision.     · You have signs of infection, such as:  ? Increased pain, swelling, warmth, or redness. ? Red streaks leading from the incision. ? Pus draining from the incision. ? A fever. Watch closely for changes in your health, and be sure to contact your doctor if:    · You have any problems with your device. Current as of: December 16, 2019               Content Version: 12.6  © 6291-3088 [employer]. Care instructions adapted under license by [facility] ([facility]). If you have questions about a medical condition or this instruction, always ask your healthcare professional. [place] [address] any warranty or liability for your use of this information.

## 2020-12-05 NOTE — PROGRESS NOTES
[facility]  Office: [address], DO, Eriberto Eaton, DO, Jim Mujica, DO, Amber Lundykofi Blood, DO, Rubén Koenig MD, Deep Diaz MD, Himanshu Em MD, Rimma Ann MD, Sha Rosales MD, Jim Epps MD, Mary Clark MD, Chasity Smith MD, Ally Christensen MD, Constantin Dixon, DO, Laura Lugo MD, Stefany Collins MD, Darby Linder DO, Merle Boston MD,  Alexandrea Meléndez, DO, Azra Ogden MD, Jovi Barnett MD, Maris Cool, [facility], [facility], CNP, Fadi Carrillo, CNP, Sage Truong, CNS, Noah Barthel, CNP, Elena Verma, CNP, Alana Bernstein, CNP, Shena Campos, CNP, Daja Reaves, CNP, Andrew Sprague PA-C, Kris [facility], [facility], Zaira Allen, CNP, Danna Morris, CNP, Yessenia Awad, CNP, Zan Erazo, CNP, Kailyn Álvarez, CNP         [facility]   [address] [facility]    Progress Note    12/5/2020    2:40 PM    Name:   Timo Miller  MRN:     [id number]     Vero Gilmer:      [de-identified]   Room:   [address] [place] [address] Day:  3  Admit Date:  12/2/2020 10:31 AM    PCP:   aCtrachito Mcgregor MD  Code Status:  Full Code    Subjective:     C/C: Syncope     Interval History Status: worsened. She is very tearful this morning because her children has not visited to talk to her and take care of her. Spoke with her son at home and encouraged them to speak to each other regarding next steps. Recommended skilled nursing facility to patient, patient tolerated pacemaker well, cleared by cardiology for discharge    Brief History:     Timo Miller is a 79 y.o. Non-/non  female who presents with No chief complaint on file. and is admitted to the hospital for the management of fall ,acute hypoglycemia      Patient was brought to [place] [address] as a Trauma Priority secondary to a fall from her chair.     Patient was brought to the emergency room after pressing her life alert button after sustaining a fall from her chair potassium chloride **OR** potassium alternative oral replacement **OR** potassium chloride, magnesium sulfate, acetaminophen **OR** acetaminophen, polyethylene glycol, promethazine **OR** [DISCONTINUED] ondansetron, nicotine, glucose, dextrose, glucagon (rDNA), dextrose    Data:     Past Medical History:   has a past medical history of Chronic diastolic heart failure ([address] Chronic kidney disease, stage III (moderate), Coronary artery disease, Diabetes ([facility] [address] Hyperlipidemia, Hypertension, LVH (left ventricular hypertrophy), Pulmonary hypertension ([facility] [address] Right bundle branch block, Seizure disorder ([facility] [address] and Subarachnoid bleed ([facility] [address] Social History:        Family History:   Family History   Problem Relation Age of Onset    Diabetes Mother     Heart Disease Mother     Diabetes Father     Hypertension Father     Colon Cancer Father     Diabetes Brother     Heart Disease Brother        Vitals:  /61   Pulse 94   Temp 97.7 °F (36.5 °C) (Oral)   Resp 21   Ht 5' 2\" (1.575 m)   Wt 274 lb 14.6 oz (124.7 kg)   SpO2 94%   BMI 50.28 kg/m²   Temp (24hrs), Av.1 °F (36.7 °C), Min:97.7 °F (36.5 °C), Max:98.5 °F (36.9 °C)    Recent Labs     20  0745 20  1254 20  1711 20  2029   POCGLU 66 163* 241* 251*       I/O (24Hr):     Intake/Output Summary (Last 24 hours) at 2020 1440  Last data filed at 2020 0300  Gross per 24 hour   Intake 250 ml   Output 1500 ml   Net -1250 ml       Labs:  Hematology:  Recent Labs     20  0747 20  0051 20  0818   WBC 9.5 11.1 12.0*   RBC 3.64* 3.37* 3.48*   HGB 11.4* 10.6* 10.8*   HCT 36.6 34.5* 35.7*   .5 102.4 102.6   MCH 31.3 31.5 31.0   MCHC 31.1 30.7 30.3   RDW 13.5 13.5 13.6    209 204   MPV 10.5 10.5 10.5   INR 1.0  --   --      Chemistry:  Recent Labs     20  0747  20  1320 20  2119 20  0051 20  0754 20  0818     --   --   --  136  --  133*   K 5.0  --   --   --  5.4* --  5.2     --   --   --  103  --  103   CO2 26  --   --   --  22  --  23   GLUCOSE 98   < > 183  --  114*  --  169*   BUN 32*  --   --   --  29*  --  24*   CREATININE 1.01*  --   --   --  1.04*  --  0.96*   MG  --   --   --   --  2.0  --  2.0   ANIONGAP 7*  --   --   --  11  --  7*   LABGLOM 53*  --   --   --  51*  --  56*   GFRAA >60  --   --   --  >60  --  >60   CALCIUM 8.8  --   --   --  8.3*  --  8.4*   PHOS  --   --   --   --  2.8  --   --    TROPHS 29*  --   --  49* 50* 38*  --     < > = values in this interval not displayed. Recent Labs     12/03/20  2133 12/04/20  0051 12/04/20  0227 12/04/20  0745 12/04/20  1100 12/04/20  1254 12/04/20  1711 12/04/20 2029 12/05/20  0818   PROT  --   --   --   --   --   --   --   --  6.2*   LABALBU  --  2.6*  --   --   --   --   --   --  2.7*   LABA1C  --   --   --   --  5.5  --   --   --   --    AST  --   --   --   --   --   --   --   --  24   ALT  --   --   --   --   --   --   --   --  18   ALKPHOS  --   --   --   --   --   --   --   --  93   BILITOT  --   --   --   --   --   --   --   --  0.30   POCGLU 109*  --  92 66  --  163* 241* 251*  --      ABG:  Lab Results   Component Value Date    FIO2 TRAUMA 12/02/2020     Lab Results   Component Value Date/Time    SPECIAL NOT REPORTED 12/03/2020 04:53 AM     Lab Results   Component Value Date/Time    CULTURE ESCHERICHIA COLI >384199 CFU/ML (A) 12/03/2020 04:53 AM       Radiology:  Wood Renita Shoulder Left 1 Vw    Result Date: 12/2/2020  Limited evaluation with only one view submitted. Inferior subluxation of the shoulder is nonspecific but could be related to positioning versus a joint effusion. Ct Head Wo Contrast    Result Date: 12/2/2020  No acute intracranial abnormality. Ct Cervical Spine Wo Contrast    Result Date: 12/2/2020  Cervical spine degenerative changes. No acute traumatic findings. Xr Shoulder Left (min 2 Views)    Result Date: 12/2/2020  Limited due to suboptimal positioning.   No evidence of acute osseous abnormality. Ct Chest Abdomen Pelvis W Contrast    Result Date: 12/2/2020  No evidence for acute traumatic injury to the chest, abdomen, or pelvis. No fracture or malalignment of the thoracolumbar spine. Multiple additional chronic findings as described above. Ct Lumbar Spine Trauma Reconstruction    Result Date: 12/2/2020  No evidence for acute traumatic injury to the chest, abdomen, or pelvis. No fracture or malalignment of the thoracolumbar spine. Multiple additional chronic findings as described above. Ct Thoracic Spine Trauma Reconstruction    Result Date: 12/2/2020  No evidence for acute traumatic injury to the chest, abdomen, or pelvis. No fracture or malalignment of the thoracolumbar spine. Multiple additional chronic findings as described above. Physical Examination:        General appearance:  alert, cooperative and no distress  Mental Status:  oriented to person, place and time and normal affect  Lungs:  clear to auscultation bilaterally, normal effort  Heart:  regular rate and rhythm, no murmur  Abdomen:  soft, nontender, nondistended, normal bowel sounds, no masses, hepatomegaly, splenomegaly  Extremities: Some bruising and coolness of upper extremities, left greater than right.   Pulses are diminished but intact  Skin:  no gross lesions, rashes, induration    Assessment:        Hospital Problems           Last Modified POA    * (Principal) Heart block AV third degree ([address] 12/4/2020 Yes    Hypoglycemia 12/2/2020 Yes    Type 2 diabetes mellitus ([address] 12/3/2020 Yes    Coronary artery disease 12/3/2020 Yes    Chronic kidney disease, stage III (moderate) 12/3/2020 Yes    Essential hypertension 12/3/2020 Yes    Pulmonary hypertension ([address] 12/3/2020 Yes    Right bundle branch block 12/3/2020 Yes    Seizure disorder ([address] 12/3/2020 Yes    Overview Signed 12/3/2020  1:39 AM by FELICE Carter - GRIECLDA     Late effects of CVA         Chronic diastolic heart failure ([address] 12/3/2020 Yes    Fall from chair 12/3/2020 Yes          Plan:        1. Loss of consciousness -pacemaker in place, cleared by cardiology for discharge, follow-up outpatient with cardiology, discontinue D10, monitor blood sugars, if cleared can discharge today. 2. High degree heart block-cardiology following  3. History of seizures-neurology following,  4. Hypoglycemia-a1c 5.5  5. Chronic diastolic heart failure  6. Right bundle branch block  7. History of CVA  8. History of myocardial infarction  9. Pulmonary hypertension  10. Chronic kidney disease  11. Type 2 diabetes  12. Essential hypertension  13.  Hyperlipidemia    Belia Almaguer, DO  12/5/2020  2:40 PM

## 2020-12-05 NOTE — PROGRESS NOTES
[facility]   Progress Note                   Date:   12/5/2020  Patient name: Francesco Bradley  Date of admission:  12/2/2020 10:31 AM  MRN:   [id number]  YOB: 1940  PCP: Latonya Trinh MD    Reason for Admission:  AMS    Subjective:     Patient is seen and examined. No acute events  Pacemaker was placed yesterday  X-ray was reviewed  Pending pacemaker interrrogation        Medications:   Scheduled Meds:   vancomycin  1,000 mg Intravenous Once    vancomycin irrigation  1,000 mg Irrigation Once    sodium chloride flush  10 mL Intravenous 2 times per day    atropine  1 mg Intravenous Once    atorvastatin  10 mg Oral Daily    levETIRAcetam  1,000 mg Oral BID    clopidogrel  75 mg Oral Daily    aspirin  81 mg Oral Daily    sodium chloride flush  10 mL Intravenous 2 times per day    insulin lispro  0-3 Units Subcutaneous Nightly       Continuous Infusions:   dextrose      dextrose 50 mL/hr at 12/04/20 1140    dextrose         CBC:   Recent Labs     12/02/20  1049 12/03/20  0747 12/04/20  0051   WBC 14.5* 9.5 11.1   HGB 12.9 11.4* 10.6*    222 209     BMP:    Recent Labs     12/02/20  1049  12/03/20  0747 12/03/20  1138 12/03/20  1320 12/04/20  0051     --  135  --   --  136   K 4.9  --  5.0  --   --  5.4*     --  102  --   --  103   CO2 28  --  26  --   --  22   BUN 37*  --  32*  --   --  29*   CREATININE 1.14*  --  1.01*  --   --  1.04*   GLUCOSE 32*   < > 98 92 183 114*    < > = values in this interval not displayed.      INR:   Recent Labs     12/02/20  1049 12/03/20  0747   INR 1.0 1.0       Objective:   Vitals: BP (!) 90/43   Pulse 96   Temp 98.3 °F (36.8 °C) (Axillary)   Resp 15   Ht 5' 2\" (1.575 m)   Wt 274 lb 14.6 oz (124.7 kg)   SpO2 93%   BMI 50.28 kg/m²     General appearance: awake, alert, in no apparent respiratory distress   HEENT: Head: Normocephalic, no lesions, without obvious abnormality  Neck: no JVD  Lungs: clear to

## 2020-12-05 NOTE — DISCHARGE SUMMARY
[facility]  Office: [address], DO, Candice Oconnor DO, Pete Claros DO, Lennox Mars Blood, DO, Ivan Mitchell MD, Branden Chase MD, Tate Rapp MD, Bulmaro Ross MD, Glen Carballo MD, Sondra Sheppard MD, Deepthi Mcneal MD, Maricel Duarte MD, Ally Kinney MD, Sherice Anna DO, Corby Robison MD, Robby Baez MD, Shruti Morales DO, Sona Melendez MD,  Sandra Virgen DO, Burna Cockayne, MD, Rafia Saleh MD, Lorne Ortiz [facility], [facility] Kyra, CNP, Ute Levine, CNP, Paddy Duane, CNS, Claudia Westbrook, CNP, Yoselin Walden, CNP, Silverio Galvan, CNP, Azul Brannon, CNP, Pamela Murillo, CNP, Jose A Hurley PA-C, Daisey Meigs, [facility], Darcie Tompkins, CNP, Malu Abel, CNP, Guero Cardenas, CNP, Keshawn Murillo, CNP, Mary Easthal, [facility]    Discharge Summary     Patient ID: Morris Gillespie  :  1940   MRN: [id number]     ACCOUNT:  [de-identified]   Patient's PCP: Jeannette Escalante MD  Admit Date: 2020   Discharge Date: 2020     Length of Stay: 3  Code Status:  Full Code  Admitting Physician: Shruti Morales DO  Discharge Physician: Shruti Morales DO     Active Discharge Diagnoses:     Hospital Problem Lists:  Principal Problem:    Heart block AV [facility])  Active Problems:    Hypoglycemia    Type 2 diabetes mellitus ([facility]    Coronary artery disease    Chronic kidney disease, stage III (moderate)    Essential hypertension    Pulmonary hypertension (HCC)    Right bundle branch block    Seizure disorder ([facility]    Chronic diastolic heart failure ([facility]    Fall from chair  Resolved Problems:    * No resolved hospital problems. *      Admission Condition:  good     Discharged Condition: good    Hospital Stay:     Hospital Course:  Morris Gillespie is a 79 y.o. female who was admitted for the management of   Heart block AV [facility]) , presented to ER with syncope.      Patient arrived with hypoglycemia with blood sugar in the 20s, patient was placed on a D10 drip and blood sugars improved. Patient the following day was evaluated and rapid response was called for unresponsiveness. Patient found to have a third-degree AV block and was evaluated by cardiology for pacemaker placement. Patient the following day was taken for pacemaker placement and tolerated therapy well, patient was evaluated by physical therapy, recommended rehab, long discussion with family and patient at bedside, patient's reluctantly agreeable with skilled nursing facility placement. Patient will discharge today to follow-up with primary care physician in 1 to 2 weeks and cardiology in 1 to 2 weeks. Patient's diabetes medications have been discontinued due to hypoglycemia. Significant therapeutic interventions: Pacemaker placement    Significant Diagnostic Studies:   Labs / Micro:  CBC:   Lab Results   Component Value Date    WBC 12.0 12/05/2020    RBC 3.48 12/05/2020    HGB 10.8 12/05/2020    HCT 35.7 12/05/2020    .6 12/05/2020    MCH 31.0 12/05/2020    MCHC 30.3 12/05/2020    RDW 13.6 12/05/2020     12/05/2020     BMP:    Lab Results   Component Value Date    GLUCOSE 169 12/05/2020     12/05/2020    K 5.2 12/05/2020     12/05/2020    CO2 23 12/05/2020    ANIONGAP 7 12/05/2020    BUN 24 12/05/2020    CREATININE 0.96 12/05/2020    BUNCRER NOT REPORTED 12/05/2020    CALCIUM 8.4 12/05/2020    LABGLOM 56 12/05/2020    GFRAA >60 12/05/2020    GFR      12/05/2020    GFR NOT REPORTED 12/05/2020     PT/INR:    Lab Results   Component Value Date    PROTIME 10.6 12/03/2020    INR 1.0 12/03/2020      Radiology:  Xr Shoulder Left 1 Vw    Result Date: 12/2/2020  Limited evaluation with only one view submitted. Inferior subluxation of the shoulder is nonspecific but could be related to positioning versus a joint effusion. Ct Head Wo Contrast    Result Date: 12/2/2020  No acute intracranial abnormality.      Ct Evaluation/Follow Up POST HOSPITALIZATION/Incidental Findings: 3rd degree AV block    Diet: cardiac diet    Activity: As tolerated    Instructions to Patient: Follow-up with primary care physician and cardiology, continue antiseizure medications. Continue therapy and rehab    Discharge Medications:      Medication List      START taking these medications    aspirin 81 MG EC tablet  Take 1 tablet by mouth daily  Start taking on:  December 6, 2020     atorvastatin 10 MG tablet  Commonly known as:  LIPITOR  Take 1 tablet by mouth daily     clopidogrel 75 MG tablet  Commonly known as:  PLAVIX  Take 1 tablet by mouth daily  Start taking on:  December 6, 2020     levETIRAcetam 1000 MG tablet  Commonly known as:  KEPPRA  Take 1 tablet by mouth 2 times daily           Where to Get Your Medications      These medications were sent to [facility] [address] [facility], [address]  [address] [place] [address], [address] [address]    Phone:  [phone]   · aspirin 81 MG EC tablet  · atorvastatin 10 MG tablet  · clopidogrel 75 MG tablet  · levETIRAcetam 1000 MG tablet         No discharge procedures on file. Time Spent on discharge is  37 mins in patient examination, evaluation, counseling as well as medication reconciliation, prescriptions for required medications, discharge plan and follow up. Electronically signed by   Tsering Tobin DO  12/5/2020  2:40 PM      Thank you Dr. Marylu Michael MD for the opportunity to be involved in this patient's care.

## 2020-12-05 NOTE — PROGRESS NOTES
Physical Therapy    Facility/Department: [facility] CAR 1  Initial Assessment    NAME: Alejandro Muñoz  : 1940  MRN: [id number]    Date of Service: 2020    Discharge Recommendations:  Patient would benefit from continued therapy after discharge      Assessment   Assessment: Pt is 80 y/o female with general weakness and difficulty walking following recent surgery; will benefit from PT to address deficits. Treatment Diagnosis: general weakness; difficulty walking  Prognosis: Good;Fair  Decision Making: High Complexity  Patient Education: PT eval and POC  REQUIRES PT FOLLOW UP: Yes  Activity Tolerance  Activity Tolerance: Patient Tolerated treatment well       Patient Diagnosis(es): The primary encounter diagnosis was Hypoglycemia. Diagnoses of Fall from bed, initial encounter and Syncope and collapse were also pertinent to this visit. has a past medical history of Chronic diastolic heart failure ([address] Chronic kidney disease, stage III (moderate), Coronary artery disease, Diabetes ([address] Hyperlipidemia, Hypertension, LVH (left ventricular hypertrophy), Pulmonary hypertension ([address] Right bundle branch block, Seizure disorder ([address] and Subarachnoid bleed ([address] has a past surgical history that includes Cataract removal and Vena Cava Filter Placement.     Restrictions  Restrictions/Precautions  Restrictions/Precautions: General Precautions, Fall Risk  Required Braces or Orthoses?: Yes  Required Braces or Orthoses  Left Upper Extremity Brace/Splint: Sling(for first 24 hours post op)     Vision/Hearing  Vision: Within Functional Limits  Hearing: Within functional limits       Subjective  General  Chart Reviewed: Yes  Response To Previous Treatment: Not applicable  Family / Caregiver Present: No  Follows Commands: Within Functional Limits  Pain Screening  Patient Currently in Pain: No    Orientation  Orientation  Overall Orientation Status: Within Functional Limits     Social/Functional History  Social/Functional History  Lives With: Alone  Type of Home: House  Home Layout: One level  Home Access: Ramped entrance  Bathroom Shower/Tub: Walk-in shower  [address] [place]: Shower chair  Home Equipment: 4 wheeled walker, Lift chair, Alert [employer] Help From: Personal care attendant  ADL Assistance: Needs assistance  Homemaking Assistance: Needs assistance  Ambulation Assistance: Independent  Transfer Assistance: Independent  Active : No  Additional Comments: Pt states she has an aid from 9 to 5 M-F. Needs help with dressing but will bath on her own while someone is at the house. Occasionally sleeps in lift chair. Cognition   Cognition  Overall Cognitive Status: WFL    Objective  AROM RLE (degrees)  RLE AROM: WFL  AROM LLE (degrees)  LLE AROM : WFL     Strength RLE  Comment: grossly 3-/5  Strength LLE  Comment: grossly 3+ to 4-/5     Bed mobility  Sit to Supine: Moderate assistance;Maximum assistance;2 Person assistance  Scooting: Moderate assistance;2 Person assistance     Transfers  Sit to Stand: Moderate Assistance;2 Person Assistance  Stand to sit: Moderate Assistance;2 Person Assistance     Ambulation  Ambulation?: Yes  Ambulation 1  Surface: level tile  Assistance: Moderate assistance;2 Person assistance  Distance: pt able to take small steps to chair located next to bed     Balance  Sitting - Static: Good  Sitting - Dynamic: Good  Standing - Static: Fair  Standing - Dynamic: [address]  Times per week: 5-6 x week  Times per day: Daily  Current Treatment Recommendations: Strengthening, Neuromuscular Re-education, Home Exercise Program, Safety Education & Training, Balance Training, Endurance Training, Patient/Caregiver Education & Training, Functional Mobility Training, Gait Training, Transfer Training  Safety Devices  Type of devices:  All fall risk precautions in place      AM-PAC Score  AM-PAC Inpatient Mobility without Stair Climbing Raw Score : 10 (12/05/20 1030)  AM-PAC Inpatient without Stair Climbing T-Scale Score : 34.07 (12/05/20 1030)  Mobility Inpatient CMS 0-100% Score: 71.66 (12/05/20 1030)  Mobility Inpatient without Stair CMS G-Code Modifier : CL (12/05/20 1030)       Goals  Short term goals  Time Frame for Short term goals: 10 days  Short term goal 1: Pt to ambulate 50ft with RW and no LOB. Short term goal 2: Pt to perform bed mobility with min A +1. Short term goal 3: Pt to transfer sit to stand CGA +1.  Short term goal 4: Pt to tolerate 20-30 mins ther ex/act for improved strength and endurance.   Patient Goals   Patient goals : none stated       Therapy Time   Individual Concurrent Group Co-treatment   Time In 0811         Time Out 0857         Minutes [facility], PT, DPT, CMPT

## 2020-12-07 NOTE — TELEPHONE ENCOUNTER
[facility] Transitions Initial Follow Up Call    Outreach made within 2 business days of discharge: Yes    Patient: Denita Gonzáles Patient : 1940   MRN: [id number]  Reason for Admission: There are no discharge diagnoses documented for the most recent discharge.   Discharge Date: 20       Spoke with: Left message    Discharge department/facility: [facility]    Left a message asking patient to call back for an appointment    Scheduled appointment with PCP within 7-14 days    Follow Up  Future Appointments   Date Time Provider Jenni Fierro   2021  9:00 AM Emir Roque MD [facility]       [place]hele [place], [place]

## 2021-01-01 ENCOUNTER — HOSPITAL ENCOUNTER (OUTPATIENT)
Dept: OCCUPATIONAL THERAPY | Age: 81
Setting detail: THERAPIES SERIES
Discharge: HOME | End: 2021-02-25
Payer: MEDICARE

## 2021-01-01 ENCOUNTER — TELEPHONE (OUTPATIENT)
Dept: PRIMARY CARE CLINIC | Age: 81
End: 2021-01-01

## 2021-01-01 ENCOUNTER — APPOINTMENT (OUTPATIENT)
Dept: GENERAL RADIOLOGY | Age: 81
DRG: 603 | End: 2021-01-01
Attending: FAMILY MEDICINE
Payer: MEDICARE

## 2021-01-01 ENCOUNTER — HOSPITAL ENCOUNTER (OUTPATIENT)
Age: 81
Setting detail: SPECIMEN
Discharge: HOME | End: 2021-03-09
Payer: MEDICARE

## 2021-01-01 ENCOUNTER — OFFICE VISIT (OUTPATIENT)
Dept: PRIMARY CARE CLINIC | Age: 81
End: 2021-01-01
Payer: MEDICARE

## 2021-01-01 ENCOUNTER — CARE COORDINATION (OUTPATIENT)
Dept: CARE COORDINATION | Age: 81
End: 2021-01-01

## 2021-01-01 ENCOUNTER — HOSPITAL ENCOUNTER (OUTPATIENT)
Age: 81
Setting detail: SPECIMEN
Discharge: HOME | End: 2021-01-05
Payer: MEDICARE

## 2021-01-01 ENCOUNTER — HOSPITAL ENCOUNTER (OUTPATIENT)
Dept: ULTRASOUND IMAGING | Age: 81
Discharge: HOME | End: 2021-01-20
Payer: MEDICARE

## 2021-01-01 ENCOUNTER — HOSPITAL ENCOUNTER (OUTPATIENT)
Dept: OCCUPATIONAL THERAPY | Age: 81
Setting detail: THERAPIES SERIES
Discharge: HOME | End: 2021-05-25
Payer: MEDICARE

## 2021-01-01 ENCOUNTER — CARE COORDINATION (OUTPATIENT)
Dept: CASE MANAGEMENT | Age: 81
End: 2021-01-01

## 2021-01-01 ENCOUNTER — APPOINTMENT (OUTPATIENT)
Dept: OCCUPATIONAL THERAPY | Age: 81
End: 2021-01-01
Payer: MEDICARE

## 2021-01-01 ENCOUNTER — HOSPITAL ENCOUNTER (OUTPATIENT)
Dept: WOUND CARE | Age: 81
Discharge: HOME | End: 2021-03-17

## 2021-01-01 ENCOUNTER — HOSPITAL ENCOUNTER (EMERGENCY)
Age: 81
Discharge: NURSING, REHAB OR LONG-TERM CARE | End: 2021-10-20
Attending: EMERGENCY MEDICINE
Payer: MEDICARE

## 2021-01-01 ENCOUNTER — HOSPITAL ENCOUNTER (OUTPATIENT)
Dept: OCCUPATIONAL THERAPY | Age: 81
Setting detail: THERAPIES SERIES
Discharge: HOME | End: 2021-02-04
Payer: MEDICARE

## 2021-01-01 ENCOUNTER — HOSPITAL ENCOUNTER (INPATIENT)
Age: 81
LOS: 5 days | Discharge: NURSING, REHAB OR LONG-TERM CARE | DRG: 603 | End: 2021-03-20
Attending: FAMILY MEDICINE | Admitting: FAMILY MEDICINE
Payer: MEDICARE

## 2021-01-01 ENCOUNTER — TELEPHONE (OUTPATIENT)
Dept: WOUND CARE | Age: 81
End: 2021-01-01

## 2021-01-01 ENCOUNTER — HOSPITAL ENCOUNTER (OUTPATIENT)
Dept: OCCUPATIONAL THERAPY | Age: 81
Setting detail: THERAPIES SERIES
Discharge: HOME | End: 2021-03-11
Payer: MEDICARE

## 2021-01-01 ENCOUNTER — PROCEDURE VISIT (OUTPATIENT)
Dept: PRIMARY CARE CLINIC | Age: 81
End: 2021-01-01
Payer: MEDICARE

## 2021-01-01 ENCOUNTER — HOSPITAL ENCOUNTER (OUTPATIENT)
Age: 81
Setting detail: SPECIMEN
Discharge: HOME | End: 2021-03-15
Payer: MEDICARE

## 2021-01-01 ENCOUNTER — HOSPITAL ENCOUNTER (OUTPATIENT)
Dept: OCCUPATIONAL THERAPY | Age: 81
Setting detail: THERAPIES SERIES
Discharge: HOME | End: 2021-03-04
Payer: MEDICARE

## 2021-01-01 ENCOUNTER — HOSPITAL ENCOUNTER (OUTPATIENT)
Age: 81
Setting detail: SPECIMEN
Discharge: HOME | End: 2021-01-19
Payer: MEDICARE

## 2021-01-01 VITALS
OXYGEN SATURATION: 97 % | HEART RATE: 79 BPM | HEIGHT: 61 IN | TEMPERATURE: 97 F | DIASTOLIC BLOOD PRESSURE: 84 MMHG | WEIGHT: 259.4 LBS | SYSTOLIC BLOOD PRESSURE: 140 MMHG | BODY MASS INDEX: 48.97 KG/M2

## 2021-01-01 VITALS
SYSTOLIC BLOOD PRESSURE: 126 MMHG | OXYGEN SATURATION: 97 % | HEART RATE: 80 BPM | DIASTOLIC BLOOD PRESSURE: 70 MMHG | TEMPERATURE: 97.2 F

## 2021-01-01 VITALS
OXYGEN SATURATION: 96 % | WEIGHT: 259.48 LBS | HEART RATE: 69 BPM | RESPIRATION RATE: 16 BRPM | DIASTOLIC BLOOD PRESSURE: 53 MMHG | SYSTOLIC BLOOD PRESSURE: 116 MMHG | TEMPERATURE: 97.5 F | BODY MASS INDEX: 48.99 KG/M2 | HEIGHT: 61 IN

## 2021-01-01 VITALS
HEART RATE: 74 BPM | BODY MASS INDEX: 49.32 KG/M2 | DIASTOLIC BLOOD PRESSURE: 72 MMHG | OXYGEN SATURATION: 96 % | TEMPERATURE: 96.8 F | WEIGHT: 261 LBS | SYSTOLIC BLOOD PRESSURE: 128 MMHG

## 2021-01-01 VITALS
DIASTOLIC BLOOD PRESSURE: 62 MMHG | SYSTOLIC BLOOD PRESSURE: 110 MMHG | TEMPERATURE: 97.1 F | OXYGEN SATURATION: 95 % | HEART RATE: 95 BPM

## 2021-01-01 VITALS
DIASTOLIC BLOOD PRESSURE: 72 MMHG | OXYGEN SATURATION: 98 % | TEMPERATURE: 97.8 F | WEIGHT: 273 LBS | HEART RATE: 90 BPM | SYSTOLIC BLOOD PRESSURE: 128 MMHG | BODY MASS INDEX: 51.58 KG/M2

## 2021-01-01 VITALS
TEMPERATURE: 96.4 F | SYSTOLIC BLOOD PRESSURE: 130 MMHG | DIASTOLIC BLOOD PRESSURE: 80 MMHG | OXYGEN SATURATION: 100 % | HEART RATE: 88 BPM

## 2021-01-01 VITALS
WEIGHT: 265.4 LBS | TEMPERATURE: 97.4 F | BODY MASS INDEX: 52.1 KG/M2 | OXYGEN SATURATION: 97 % | SYSTOLIC BLOOD PRESSURE: 122 MMHG | HEIGHT: 60 IN | DIASTOLIC BLOOD PRESSURE: 74 MMHG | HEART RATE: 81 BPM

## 2021-01-01 VITALS
HEIGHT: 61 IN | OXYGEN SATURATION: 97 % | BODY MASS INDEX: 49.32 KG/M2 | SYSTOLIC BLOOD PRESSURE: 132 MMHG | TEMPERATURE: 97 F | HEART RATE: 60 BPM | DIASTOLIC BLOOD PRESSURE: 76 MMHG

## 2021-01-01 VITALS
BODY MASS INDEX: 49.09 KG/M2 | OXYGEN SATURATION: 97 % | WEIGHT: 260 LBS | HEIGHT: 61 IN | RESPIRATION RATE: 17 BRPM | TEMPERATURE: 97.7 F | HEART RATE: 86 BPM | DIASTOLIC BLOOD PRESSURE: 51 MMHG | SYSTOLIC BLOOD PRESSURE: 104 MMHG

## 2021-01-01 DIAGNOSIS — R60.0 LOWER EXTREMITY EDEMA: Primary | ICD-10-CM

## 2021-01-01 DIAGNOSIS — I89.0 LYMPHEDEMA: ICD-10-CM

## 2021-01-01 DIAGNOSIS — R60.0 EDEMA OF BOTH LEGS: ICD-10-CM

## 2021-01-01 DIAGNOSIS — S81.802D WOUND OF LEFT LOWER EXTREMITY, SUBSEQUENT ENCOUNTER: ICD-10-CM

## 2021-01-01 DIAGNOSIS — Z90.5 SINGLE KIDNEY: ICD-10-CM

## 2021-01-01 DIAGNOSIS — B37.2 YEAST DERMATITIS: Primary | ICD-10-CM

## 2021-01-01 DIAGNOSIS — R31.0 GROSS HEMATURIA: Primary | ICD-10-CM

## 2021-01-01 DIAGNOSIS — E13.22 SECONDARY DIABETES MELLITUS WITH STAGE 3 CHRONIC KIDNEY DISEASE (HCC): ICD-10-CM

## 2021-01-01 DIAGNOSIS — N18.30 BENIGN HYPERTENSION WITH CKD (CHRONIC KIDNEY DISEASE) STAGE III (HCC): ICD-10-CM

## 2021-01-01 DIAGNOSIS — L03.116 CELLULITIS OF LEFT LOWER EXTREMITY: Primary | ICD-10-CM

## 2021-01-01 DIAGNOSIS — R60.0 LOWER EXTREMITY EDEMA: ICD-10-CM

## 2021-01-01 DIAGNOSIS — N18.30 SECONDARY DIABETES MELLITUS WITH STAGE 3 CHRONIC KIDNEY DISEASE (HCC): ICD-10-CM

## 2021-01-01 DIAGNOSIS — N18.31 STAGE 3A CHRONIC KIDNEY DISEASE (HCC): ICD-10-CM

## 2021-01-01 DIAGNOSIS — E11.65 UNCONTROLLED TYPE 2 DIABETES MELLITUS WITH HYPERGLYCEMIA (HCC): Primary | ICD-10-CM

## 2021-01-01 DIAGNOSIS — I82.4Z2 DEEP VEIN THROMBOSIS (DVT) OF DISTAL VEIN OF LEFT LOWER EXTREMITY, UNSPECIFIED CHRONICITY (HCC): ICD-10-CM

## 2021-01-01 DIAGNOSIS — R30.0 DYSURIA: ICD-10-CM

## 2021-01-01 DIAGNOSIS — E66.01 MORBID OBESITY WITH BMI OF 45.0-49.9, ADULT (HCC): ICD-10-CM

## 2021-01-01 DIAGNOSIS — F32.A DEPRESSION, UNSPECIFIED DEPRESSION TYPE: ICD-10-CM

## 2021-01-01 DIAGNOSIS — I87.2 VENOUS INSUFFICIENCY (CHRONIC) (PERIPHERAL): ICD-10-CM

## 2021-01-01 DIAGNOSIS — B37.2 YEAST DERMATITIS: ICD-10-CM

## 2021-01-01 DIAGNOSIS — G40.909 NONINTRACTABLE EPILEPSY WITHOUT STATUS EPILEPTICUS, UNSPECIFIED EPILEPSY TYPE (HCC): ICD-10-CM

## 2021-01-01 DIAGNOSIS — L03.116 CELLULITIS OF LEFT LOWER EXTREMITY: ICD-10-CM

## 2021-01-01 DIAGNOSIS — E11.59 TYPE 2 DIABETES MELLITUS WITH OTHER CIRCULATORY COMPLICATION, WITH LONG-TERM CURRENT USE OF INSULIN (HCC): Primary | ICD-10-CM

## 2021-01-01 DIAGNOSIS — D48.5 NEOPLASM OF UNCERTAIN BEHAVIOR OF SKIN: ICD-10-CM

## 2021-01-01 DIAGNOSIS — R23.8 BULLOUS LESION: ICD-10-CM

## 2021-01-01 DIAGNOSIS — D48.5 NEOPLASM OF UNCERTAIN BEHAVIOR OF SKIN: Primary | ICD-10-CM

## 2021-01-01 DIAGNOSIS — I12.9 BENIGN HYPERTENSION WITH CKD (CHRONIC KIDNEY DISEASE) STAGE III (HCC): ICD-10-CM

## 2021-01-01 DIAGNOSIS — I44.2 HEART BLOCK AV THIRD DEGREE (HCC): ICD-10-CM

## 2021-01-01 DIAGNOSIS — Z79.4 TYPE 2 DIABETES MELLITUS WITH OTHER CIRCULATORY COMPLICATION, WITH LONG-TERM CURRENT USE OF INSULIN (HCC): Primary | ICD-10-CM

## 2021-01-01 DIAGNOSIS — I50.32 CHRONIC DIASTOLIC HEART FAILURE (HCC): Primary | ICD-10-CM

## 2021-01-01 DIAGNOSIS — T07.XXXA MULTIPLE OPEN WOUNDS: ICD-10-CM

## 2021-01-01 DIAGNOSIS — R45.1 AGITATION: Primary | ICD-10-CM

## 2021-01-01 DIAGNOSIS — R41.0 CONFUSION: Primary | ICD-10-CM

## 2021-01-01 DIAGNOSIS — R21 RASH: ICD-10-CM

## 2021-01-01 DIAGNOSIS — L03.119 CELLULITIS OF LOWER EXTREMITY, UNSPECIFIED LATERALITY: ICD-10-CM

## 2021-01-01 DIAGNOSIS — L03.119 CELLULITIS OF LOWER EXTREMITY, UNSPECIFIED LATERALITY: Primary | ICD-10-CM

## 2021-01-01 DIAGNOSIS — L12.0 BULLOUS PEMPHIGOID: ICD-10-CM

## 2021-01-01 DIAGNOSIS — E11.65 UNCONTROLLED TYPE 2 DIABETES MELLITUS WITH HYPERGLYCEMIA (HCC): ICD-10-CM

## 2021-01-01 DIAGNOSIS — R30.0 DYSURIA: Primary | ICD-10-CM

## 2021-01-01 DIAGNOSIS — I50.32 CHRONIC DIASTOLIC HEART FAILURE (HCC): ICD-10-CM

## 2021-01-01 LAB
ABSOLUTE EOS #: 0 K/UL (ref 0–0.4)
ABSOLUTE EOS #: 1 K/UL (ref 0–0.4)
ABSOLUTE IMMATURE GRANULOCYTE: ABNORMAL K/UL (ref 0–0.3)
ABSOLUTE IMMATURE GRANULOCYTE: ABNORMAL K/UL (ref 0–0.3)
ABSOLUTE LYMPH #: 1.7 K/UL (ref 1–4.8)
ABSOLUTE LYMPH #: 1.8 K/UL (ref 1–4.8)
ABSOLUTE MONO #: 0.7 K/UL (ref 0.1–1.3)
ABSOLUTE MONO #: 1 K/UL (ref 0.1–1.3)
ALBUMIN SERPL-MCNC: 3.5 G/DL (ref 3.5–5.2)
ALBUMIN SERPL-MCNC: 3.6 G/DL (ref 3.5–5.2)
ALBUMIN SERPL-MCNC: NORMAL G/DL
ALBUMIN/GLOBULIN RATIO: ABNORMAL (ref 1–2.5)
ALBUMIN/GLOBULIN RATIO: ABNORMAL (ref 1–2.5)
ALP BLD-CCNC: 120 U/L (ref 35–104)
ALP BLD-CCNC: 98 U/L (ref 35–104)
ALP BLD-CCNC: NORMAL U/L
ALT SERPL-CCNC: 12 U/L (ref 5–33)
ALT SERPL-CCNC: 13 U/L (ref 5–33)
ALT SERPL-CCNC: NORMAL U/L
ANION GAP SERPL CALCULATED.3IONS-SCNC: 10 MMOL/L (ref 9–17)
ANION GAP SERPL CALCULATED.3IONS-SCNC: 10 MMOL/L (ref 9–17)
ANION GAP SERPL CALCULATED.3IONS-SCNC: 11 MMOL/L (ref 9–17)
ANION GAP SERPL CALCULATED.3IONS-SCNC: 11 MMOL/L (ref 9–17)
ANION GAP SERPL CALCULATED.3IONS-SCNC: 13 MMOL/L (ref 9–17)
ANION GAP SERPL CALCULATED.3IONS-SCNC: 13 MMOL/L (ref 9–17)
ANION GAP SERPL CALCULATED.3IONS-SCNC: 8 MMOL/L (ref 9–17)
ANION GAP SERPL CALCULATED.3IONS-SCNC: NORMAL MMOL/L
AST SERPL-CCNC: 11 U/L
AST SERPL-CCNC: 22 U/L
AST SERPL-CCNC: NORMAL U/L
AVERAGE GLUCOSE: 247
BASOPHILS # BLD: 1 % (ref 0–2)
BASOPHILS # BLD: 1 % (ref 0–2)
BASOPHILS ABSOLUTE: 0 /ΜL
BASOPHILS ABSOLUTE: 0 K/UL (ref 0–0.2)
BASOPHILS ABSOLUTE: 0.1 K/UL (ref 0–0.2)
BASOPHILS RELATIVE PERCENT: 0.1 %
BILIRUB SERPL-MCNC: 0.23 MG/DL (ref 0.3–1.2)
BILIRUB SERPL-MCNC: <0.15 MG/DL (ref 0.3–1.2)
BILIRUB SERPL-MCNC: NORMAL MG/DL
BILIRUBIN URINE: NEGATIVE
BILIRUBIN, POC: NORMAL
BILIRUBIN, POC: NORMAL
BLOOD URINE, POC: NORMAL
BLOOD URINE, POC: NORMAL
BUN BLDV-MCNC: 29 MG/DL (ref 8–23)
BUN BLDV-MCNC: 38 MG/DL (ref 8–23)
BUN BLDV-MCNC: 38 MG/DL (ref 8–23)
BUN BLDV-MCNC: 39 MG/DL
BUN BLDV-MCNC: 39 MG/DL (ref 8–23)
BUN BLDV-MCNC: 40 MG/DL (ref 8–23)
BUN BLDV-MCNC: 40 MG/DL (ref 8–23)
BUN BLDV-MCNC: 47 MG/DL (ref 8–23)
BUN BLDV-MCNC: NORMAL MG/DL
BUN/CREAT BLD: ABNORMAL (ref 9–20)
CALCIUM SERPL-MCNC: 10.3 MG/DL (ref 8.6–10.4)
CALCIUM SERPL-MCNC: 8.7 MG/DL (ref 8.6–10.4)
CALCIUM SERPL-MCNC: 9 MG/DL (ref 8.6–10.4)
CALCIUM SERPL-MCNC: 9.1 MG/DL
CALCIUM SERPL-MCNC: 9.1 MG/DL (ref 8.6–10.4)
CALCIUM SERPL-MCNC: 9.3 MG/DL (ref 8.6–10.4)
CALCIUM SERPL-MCNC: 9.6 MG/DL (ref 8.6–10.4)
CALCIUM SERPL-MCNC: 9.7 MG/DL (ref 8.6–10.4)
CALCIUM SERPL-MCNC: NORMAL MG/DL
CHLORIDE BLD-SCNC: 100 MMOL/L
CHLORIDE BLD-SCNC: 100 MMOL/L (ref 98–107)
CHLORIDE BLD-SCNC: 94 MMOL/L (ref 98–107)
CHLORIDE BLD-SCNC: 96 MMOL/L (ref 98–107)
CHLORIDE BLD-SCNC: 97 MMOL/L (ref 98–107)
CHLORIDE BLD-SCNC: 97 MMOL/L (ref 98–107)
CHLORIDE BLD-SCNC: 99 MMOL/L (ref 98–107)
CHLORIDE BLD-SCNC: 99 MMOL/L (ref 98–107)
CHLORIDE BLD-SCNC: NORMAL MMOL/L
CHOLESTEROL, TOTAL: 126 MG/DL
CHOLESTEROL/HDL RATIO: 3.3
CHP ED QC CHECK: NORMAL
CLARITY, POC: CLEAR
CLARITY, POC: CLEAR
CO2: 25 MMOL/L
CO2: 26 MMOL/L (ref 20–31)
CO2: 26 MMOL/L (ref 20–31)
CO2: 27 MMOL/L (ref 20–31)
CO2: 28 MMOL/L (ref 20–31)
CO2: 29 MMOL/L (ref 20–31)
CO2: 30 MMOL/L (ref 20–31)
CO2: 33 MMOL/L (ref 20–31)
CO2: NORMAL
COLOR, POC: YELLOW
COLOR, POC: YELLOW
COLOR: YELLOW
COMMENT UA: NORMAL
CREAT SERPL-MCNC: 1.16 MG/DL (ref 0.5–0.9)
CREAT SERPL-MCNC: 1.2 MG/DL
CREAT SERPL-MCNC: 1.29 MG/DL (ref 0.5–0.9)
CREAT SERPL-MCNC: 1.29 MG/DL (ref 0.5–0.9)
CREAT SERPL-MCNC: 1.35 MG/DL (ref 0.5–0.9)
CREAT SERPL-MCNC: 1.35 MG/DL (ref 0.5–0.9)
CREAT SERPL-MCNC: 1.38 MG/DL (ref 0.5–0.9)
CREAT SERPL-MCNC: 1.39 MG/DL (ref 0.5–0.9)
CREAT SERPL-MCNC: NORMAL MG/DL
CREATININE URINE POCT: NORMAL
CULTURE: ABNORMAL
CULTURE: ABNORMAL
CULTURE: NO GROWTH
CULTURE: NORMAL
DERMATOLOGY PATHOLOGY REPORT: NORMAL
DIFFERENTIAL TYPE: ABNORMAL
DIFFERENTIAL TYPE: ABNORMAL
DIRECT EXAM: ABNORMAL
DIRECT EXAM: ABNORMAL
DIRECT EXAM: NORMAL
DIRECT EXAM: NORMAL
EOSINOPHILS ABSOLUTE: 0.1 /ΜL
EOSINOPHILS RELATIVE PERCENT: 0.6 %
EOSINOPHILS RELATIVE PERCENT: 1 % (ref 0–4)
EOSINOPHILS RELATIVE PERCENT: 10 % (ref 0–4)
GFR AFRICAN AMERICAN: 44 ML/MIN
GFR AFRICAN AMERICAN: 44 ML/MIN
GFR AFRICAN AMERICAN: 46 ML/MIN
GFR AFRICAN AMERICAN: 46 ML/MIN
GFR AFRICAN AMERICAN: 48 ML/MIN
GFR AFRICAN AMERICAN: 48 ML/MIN
GFR AFRICAN AMERICAN: 54 ML/MIN
GFR CALCULATED: 43
GFR CALCULATED: NORMAL
GFR NON-AFRICAN AMERICAN: 36 ML/MIN
GFR NON-AFRICAN AMERICAN: 37 ML/MIN
GFR NON-AFRICAN AMERICAN: 38 ML/MIN
GFR NON-AFRICAN AMERICAN: 38 ML/MIN
GFR NON-AFRICAN AMERICAN: 40 ML/MIN
GFR NON-AFRICAN AMERICAN: 40 ML/MIN
GFR NON-AFRICAN AMERICAN: 45 ML/MIN
GFR SERPL CREATININE-BSD FRML MDRD: ABNORMAL ML/MIN/{1.73_M2}
GLUCOSE BLD-MCNC: 101 MG/DL (ref 65–105)
GLUCOSE BLD-MCNC: 102 MG/DL (ref 65–105)
GLUCOSE BLD-MCNC: 103 MG/DL (ref 65–105)
GLUCOSE BLD-MCNC: 110 MG/DL (ref 65–105)
GLUCOSE BLD-MCNC: 117 MG/DL (ref 70–99)
GLUCOSE BLD-MCNC: 125 MG/DL (ref 65–105)
GLUCOSE BLD-MCNC: 126 MG/DL (ref 65–105)
GLUCOSE BLD-MCNC: 128 MG/DL (ref 65–105)
GLUCOSE BLD-MCNC: 133 MG/DL
GLUCOSE BLD-MCNC: 140 MG/DL (ref 70–99)
GLUCOSE BLD-MCNC: 144 MG/DL (ref 65–105)
GLUCOSE BLD-MCNC: 145 MG/DL (ref 65–105)
GLUCOSE BLD-MCNC: 148 MG/DL (ref 65–105)
GLUCOSE BLD-MCNC: 149 MG/DL (ref 65–105)
GLUCOSE BLD-MCNC: 151 MG/DL (ref 65–105)
GLUCOSE BLD-MCNC: 157 MG/DL (ref 70–99)
GLUCOSE BLD-MCNC: 161 MG/DL (ref 65–105)
GLUCOSE BLD-MCNC: 169 MG/DL (ref 70–99)
GLUCOSE BLD-MCNC: 171 MG/DL (ref 65–105)
GLUCOSE BLD-MCNC: 173 MG/DL (ref 70–99)
GLUCOSE BLD-MCNC: 176 MG/DL (ref 70–99)
GLUCOSE BLD-MCNC: 177 MG/DL (ref 65–105)
GLUCOSE BLD-MCNC: 187 MG/DL (ref 65–105)
GLUCOSE BLD-MCNC: 187 MG/DL (ref 70–99)
GLUCOSE BLD-MCNC: 201 MG/DL (ref 65–105)
GLUCOSE BLD-MCNC: 219 MG/DL (ref 65–105)
GLUCOSE BLD-MCNC: 235 MG/DL (ref 65–105)
GLUCOSE BLD-MCNC: 269 MG/DL (ref 65–105)
GLUCOSE BLD-MCNC: 288 MG/DL (ref 65–105)
GLUCOSE BLD-MCNC: 310 MG/DL (ref 65–105)
GLUCOSE BLD-MCNC: 52 MG/DL
GLUCOSE BLD-MCNC: 56 MG/DL (ref 65–105)
GLUCOSE BLD-MCNC: 56 MG/DL (ref 65–105)
GLUCOSE BLD-MCNC: 58 MG/DL
GLUCOSE BLD-MCNC: 99 MG/DL (ref 65–105)
GLUCOSE URINE, POC: NORMAL
GLUCOSE URINE, POC: NORMAL
GLUCOSE URINE: NEGATIVE
HBA1C MFR BLD: 6.3 %
HCT VFR BLD CALC: 28.8 % (ref 36–46)
HCT VFR BLD CALC: 36.1 % (ref 36–46)
HCT VFR BLD CALC: 36.4 % (ref 36–46)
HDLC SERPL-MCNC: 38 MG/DL (ref 35–70)
HEMOGLOBIN: 11.9 G/DL (ref 12–16)
HEMOGLOBIN: 11.9 G/DL (ref 12–16)
HEMOGLOBIN: 9.7 G/DL (ref 12–16)
IMMATURE GRANULOCYTES: ABNORMAL %
IMMATURE GRANULOCYTES: ABNORMAL %
KETONES, POC: NORMAL
KETONES, POC: NORMAL
KETONES, URINE: NEGATIVE
LDL CHOLESTEROL CALCULATED: 61 MG/DL (ref 0–160)
LEUKOCYTE EST, POC: NORMAL
LEUKOCYTE EST, POC: NORMAL
LEUKOCYTE ESTERASE, URINE: NEGATIVE
LYMPHOCYTES # BLD: 19 % (ref 24–44)
LYMPHOCYTES # BLD: 22 % (ref 24–44)
LYMPHOCYTES ABSOLUTE: 1.6 /ΜL
LYMPHOCYTES RELATIVE PERCENT: 11.1 %
Lab: ABNORMAL
Lab: NORMAL
MCH RBC QN AUTO: 31.6 PG (ref 26–34)
MCH RBC QN AUTO: 31.8 PG
MCH RBC QN AUTO: 32.5 PG (ref 26–34)
MCHC RBC AUTO-ENTMCNC: 32.7 G/DL
MCHC RBC AUTO-ENTMCNC: 32.9 G/DL (ref 31–37)
MCHC RBC AUTO-ENTMCNC: 33.8 G/DL (ref 31–37)
MCV RBC AUTO: 96 FL (ref 80–100)
MCV RBC AUTO: 96.2 FL (ref 80–100)
MCV RBC AUTO: 97.3 FL
MICROALBUMIN/CREAT 24H UR: NORMAL MG/G{CREAT}
MICROALBUMIN/CREAT UR-RTO: NORMAL
MONOCYTES # BLD: 11 % (ref 1–7)
MONOCYTES # BLD: 9 % (ref 1–7)
MONOCYTES ABSOLUTE: 1.4 /ΜL
MONOCYTES RELATIVE PERCENT: 9.4 %
NEUTROPHILS ABSOLUTE: 11.3 /ΜL
NEUTROPHILS RELATIVE PERCENT: 78.2 %
NITRITE, POC: NORMAL
NITRITE, POC: NORMAL
NITRITE, URINE: NEGATIVE
NONHDLC SERPL-MCNC: NORMAL MG/DL
NRBC AUTOMATED: ABNORMAL PER 100 WBC
NRBC AUTOMATED: ABNORMAL PER 100 WBC
PDW BLD-RTO: 13.1 %
PDW BLD-RTO: 13.4 % (ref 11.5–14.9)
PDW BLD-RTO: 15.7 % (ref 11.5–14.9)
PH UA: 7 (ref 5–8)
PH, POC: 6
PH, POC: 6.5
PLATELET # BLD: 174 K/ΜL
PLATELET # BLD: 201 K/UL (ref 150–450)
PLATELET # BLD: 250 K/UL (ref 150–450)
PLATELET ESTIMATE: ABNORMAL
PLATELET ESTIMATE: ABNORMAL
PMV BLD AUTO: 7.2 FL (ref 6–12)
PMV BLD AUTO: 8.9 FL (ref 6–12)
PMV BLD AUTO: ABNORMAL FL
POTASSIUM SERPL-SCNC: 3.9 MMOL/L (ref 3.7–5.3)
POTASSIUM SERPL-SCNC: 3.9 MMOL/L (ref 3.7–5.3)
POTASSIUM SERPL-SCNC: 4.2 MMOL/L (ref 3.7–5.3)
POTASSIUM SERPL-SCNC: 4.3 MMOL/L (ref 3.7–5.3)
POTASSIUM SERPL-SCNC: 4.4 MMOL/L (ref 3.7–5.3)
POTASSIUM SERPL-SCNC: 4.5 MMOL/L
POTASSIUM SERPL-SCNC: 4.6 MMOL/L (ref 3.7–5.3)
POTASSIUM SERPL-SCNC: 5.1 MMOL/L (ref 3.7–5.3)
POTASSIUM SERPL-SCNC: NORMAL MMOL/L
PROTEIN UA: NEGATIVE
PROTEIN, POC: NORMAL
PROTEIN, POC: NORMAL
RBC # BLD: 2.99 M/UL (ref 4–5.2)
RBC # BLD: 3.74 10^6/ΜL
RBC # BLD: 3.76 M/UL (ref 4–5.2)
RBC # BLD: ABNORMAL 10*6/UL
RBC # BLD: ABNORMAL 10*6/UL
SARS-COV-2, RAPID: NOT DETECTED
SEG NEUTROPHILS: 59 % (ref 36–66)
SEG NEUTROPHILS: 67 % (ref 36–66)
SEGMENTED NEUTROPHILS ABSOLUTE COUNT: 5.3 K/UL (ref 1.3–9.1)
SEGMENTED NEUTROPHILS ABSOLUTE COUNT: 5.6 K/UL (ref 1.3–9.1)
SODIUM BLD-SCNC: 133 MMOL/L (ref 135–144)
SODIUM BLD-SCNC: 134 MMOL/L
SODIUM BLD-SCNC: 134 MMOL/L (ref 135–144)
SODIUM BLD-SCNC: 136 MMOL/L (ref 135–144)
SODIUM BLD-SCNC: 137 MMOL/L (ref 135–144)
SODIUM BLD-SCNC: 138 MMOL/L (ref 135–144)
SODIUM BLD-SCNC: 139 MMOL/L (ref 135–144)
SODIUM BLD-SCNC: 140 MMOL/L (ref 135–144)
SODIUM BLD-SCNC: NORMAL MMOL/L
SPECIFIC GRAVITY UA: 1.01 (ref 1–1.03)
SPECIFIC GRAVITY, POC: 1.01
SPECIFIC GRAVITY, POC: 1.01
SPECIMEN DESCRIPTION: ABNORMAL
SPECIMEN DESCRIPTION: NORMAL
TOTAL PROTEIN: 7.5 G/DL (ref 6.4–8.3)
TOTAL PROTEIN: 7.7 G/DL (ref 6.4–8.3)
TOTAL PROTEIN: NORMAL
TRIGL SERPL-MCNC: 131 MG/DL
TURBIDITY: CLEAR
URINE HGB: NEGATIVE
UROBILINOGEN, POC: NORMAL
UROBILINOGEN, POC: NORMAL
UROBILINOGEN, URINE: NORMAL
VANCOMYCIN TROUGH DATE LAST DOSE: ABNORMAL
VANCOMYCIN TROUGH DOSE AMOUNT: 1750
VANCOMYCIN TROUGH TIME LAST DOSE: 621
VANCOMYCIN TROUGH: 21.1 UG/ML (ref 10–20)
VLDLC SERPL CALC-MCNC: 26 MG/DL
WBC # BLD: 14.45 10^3/ML
WBC # BLD: 7.8 K/UL (ref 3.5–11)
WBC # BLD: 9.4 K/UL (ref 3.5–11)
WBC # BLD: ABNORMAL 10*3/UL
WBC # BLD: ABNORMAL 10*3/UL

## 2021-01-01 PROCEDURE — 36415 COLL VENOUS BLD VENIPUNCTURE: CPT

## 2021-01-01 PROCEDURE — 6370000000 HC RX 637 (ALT 250 FOR IP): Performed by: FAMILY MEDICINE

## 2021-01-01 PROCEDURE — 85025 COMPLETE CBC W/AUTO DIFF WBC: CPT

## 2021-01-01 PROCEDURE — 99232 SBSQ HOSP IP/OBS MODERATE 35: CPT | Performed by: FAMILY MEDICINE

## 2021-01-01 PROCEDURE — 2580000003 HC RX 258: Performed by: FAMILY MEDICINE

## 2021-01-01 PROCEDURE — 6360000002 HC RX W HCPCS: Performed by: FAMILY MEDICINE

## 2021-01-01 PROCEDURE — 97140 MANUAL THERAPY 1/> REGIONS: CPT

## 2021-01-01 PROCEDURE — 97110 THERAPEUTIC EXERCISES: CPT

## 2021-01-01 PROCEDURE — 80202 ASSAY OF VANCOMYCIN: CPT

## 2021-01-01 PROCEDURE — 97165 OT EVAL LOW COMPLEX 30 MIN: CPT

## 2021-01-01 PROCEDURE — 2500000003 HC RX 250 WO HCPCS: Performed by: FAMILY MEDICINE

## 2021-01-01 PROCEDURE — 99214 OFFICE O/P EST MOD 30 MIN: CPT | Performed by: FAMILY MEDICINE

## 2021-01-01 PROCEDURE — 81003 URINALYSIS AUTO W/O SCOPE: CPT | Performed by: FAMILY MEDICINE

## 2021-01-01 PROCEDURE — 97535 SELF CARE MNGMENT TRAINING: CPT

## 2021-01-01 PROCEDURE — U0002 COVID-19 LAB TEST NON-CDC: HCPCS

## 2021-01-01 PROCEDURE — 97530 THERAPEUTIC ACTIVITIES: CPT

## 2021-01-01 PROCEDURE — 99238 HOSP IP/OBS DSCHRG MGMT 30/<: CPT | Performed by: FAMILY MEDICINE

## 2021-01-01 PROCEDURE — 99214 OFFICE O/P EST MOD 30 MIN: CPT | Performed by: PHYSICIAN ASSISTANT

## 2021-01-01 PROCEDURE — G0378 HOSPITAL OBSERVATION PER HR: HCPCS

## 2021-01-01 PROCEDURE — 80048 BASIC METABOLIC PNL TOTAL CA: CPT

## 2021-01-01 PROCEDURE — 80053 COMPREHEN METABOLIC PANEL: CPT

## 2021-01-01 PROCEDURE — G0379 DIRECT REFER HOSPITAL OBSERV: HCPCS

## 2021-01-01 PROCEDURE — 1200000000 HC SEMI PRIVATE

## 2021-01-01 PROCEDURE — 1111F DSCHRG MED/CURRENT MED MERGE: CPT | Performed by: PHYSICIAN ASSISTANT

## 2021-01-01 PROCEDURE — 82947 ASSAY GLUCOSE BLOOD QUANT: CPT

## 2021-01-01 PROCEDURE — 99222 1ST HOSP IP/OBS MODERATE 55: CPT | Performed by: FAMILY MEDICINE

## 2021-01-01 PROCEDURE — 81003 URINALYSIS AUTO W/O SCOPE: CPT | Performed by: PHYSICIAN ASSISTANT

## 2021-01-01 PROCEDURE — 99284 EMERGENCY DEPT VISIT MOD MDM: CPT

## 2021-01-01 PROCEDURE — 11104 PUNCH BX SKIN SINGLE LESION: CPT | Performed by: PHYSICIAN ASSISTANT

## 2021-01-01 PROCEDURE — 82962 GLUCOSE BLOOD TEST: CPT | Performed by: PHYSICIAN ASSISTANT

## 2021-01-01 PROCEDURE — 99214 OFFICE O/P EST MOD 30 MIN: CPT | Performed by: NURSE PRACTITIONER

## 2021-01-01 PROCEDURE — 99213 OFFICE O/P EST LOW 20 MIN: CPT | Performed by: FAMILY MEDICINE

## 2021-01-01 PROCEDURE — 97162 PT EVAL MOD COMPLEX 30 MIN: CPT

## 2021-01-01 PROCEDURE — 76770 US EXAM ABDO BACK WALL COMP: CPT

## 2021-01-01 PROCEDURE — 99495 TRANSJ CARE MGMT MOD F2F 14D: CPT | Performed by: PHYSICIAN ASSISTANT

## 2021-01-01 PROCEDURE — 71046 X-RAY EXAM CHEST 2 VIEWS: CPT

## 2021-01-01 PROCEDURE — 97166 OT EVAL MOD COMPLEX 45 MIN: CPT

## 2021-01-01 PROCEDURE — 81003 URINALYSIS AUTO W/O SCOPE: CPT

## 2021-01-01 PROCEDURE — 82044 UR ALBUMIN SEMIQUANTITATIVE: CPT | Performed by: PHYSICIAN ASSISTANT

## 2021-01-01 RX ORDER — POLYETHYLENE GLYCOL 3350 17 G/17G
17 POWDER, FOR SOLUTION ORAL DAILY PRN
Status: DISCONTINUED | OUTPATIENT
Start: 2021-01-01 | End: 2021-01-01 | Stop reason: HOSPADM

## 2021-01-01 RX ORDER — CLOPIDOGREL BISULFATE 75 MG/1
75 TABLET ORAL DAILY
Qty: 90 TABLET | Refills: 3 | Status: SHIPPED | OUTPATIENT
Start: 2021-01-01

## 2021-01-01 RX ORDER — FUROSEMIDE 40 MG/1
40 TABLET ORAL DAILY
Qty: 90 TABLET | Refills: 3 | Status: ON HOLD | OUTPATIENT
Start: 2021-01-01 | End: 2021-01-01 | Stop reason: HOSPADM

## 2021-01-01 RX ORDER — ATORVASTATIN CALCIUM 10 MG/1
10 TABLET, FILM COATED ORAL DAILY
Refills: 3 | Status: DISCONTINUED | OUTPATIENT
Start: 2021-01-01 | End: 2021-01-01 | Stop reason: SDUPTHER

## 2021-01-01 RX ORDER — NYSTATIN 100000 [USP'U]/G
POWDER TOPICAL 3 TIMES DAILY
Qty: 1800 G | Refills: 3 | Status: ON HOLD | OUTPATIENT
Start: 2021-01-01 | End: 2021-01-01 | Stop reason: HOSPADM

## 2021-01-01 RX ORDER — NYSTATIN 100000 [USP'U]/G
POWDER TOPICAL
Qty: 60 G | Refills: 0 | Status: SHIPPED | OUTPATIENT
Start: 2021-01-01 | End: 2021-01-01 | Stop reason: SDUPTHER

## 2021-01-01 RX ORDER — HYDROXYZINE HYDROCHLORIDE 25 MG/1
25 TABLET, FILM COATED ORAL EVERY 4 HOURS PRN
Qty: 30 TABLET | Refills: 0
Start: 2021-01-01 | End: 2021-01-01

## 2021-01-01 RX ORDER — SPIRONOLACTONE 25 MG/1
TABLET ORAL
Qty: 90 TABLET | Refills: 3 | Status: SHIPPED | OUTPATIENT
Start: 2021-01-01

## 2021-01-01 RX ORDER — CETIRIZINE HYDROCHLORIDE 10 MG/1
10 TABLET ORAL DAILY
Status: DISCONTINUED | OUTPATIENT
Start: 2021-01-01 | End: 2021-01-01

## 2021-01-01 RX ORDER — CETIRIZINE HYDROCHLORIDE 5 MG/1
5 TABLET ORAL DAILY
Status: DISCONTINUED | OUTPATIENT
Start: 2021-01-01 | End: 2021-01-01 | Stop reason: HOSPADM

## 2021-01-01 RX ORDER — PROMETHAZINE HYDROCHLORIDE 25 MG/1
12.5 TABLET ORAL EVERY 6 HOURS PRN
Status: DISCONTINUED | OUTPATIENT
Start: 2021-01-01 | End: 2021-01-01 | Stop reason: HOSPADM

## 2021-01-01 RX ORDER — HYDROCODONE BITARTRATE AND ACETAMINOPHEN 5; 325 MG/1; MG/1
1 TABLET ORAL EVERY 4 HOURS PRN
Status: DISCONTINUED | OUTPATIENT
Start: 2021-01-01 | End: 2021-01-01 | Stop reason: HOSPADM

## 2021-01-01 RX ORDER — POTASSIUM CHLORIDE 1500 MG/1
TABLET, FILM COATED, EXTENDED RELEASE ORAL
Qty: 28 TABLET | Refills: 0 | OUTPATIENT
Start: 2021-01-01

## 2021-01-01 RX ORDER — SODIUM CHLORIDE 0.9 % (FLUSH) 0.9 %
10 SYRINGE (ML) INJECTION PRN
Status: DISCONTINUED | OUTPATIENT
Start: 2021-01-01 | End: 2021-01-01 | Stop reason: HOSPADM

## 2021-01-01 RX ORDER — BUMETANIDE 0.25 MG/ML
1 INJECTION, SOLUTION INTRAMUSCULAR; INTRAVENOUS EVERY 12 HOURS
Status: DISCONTINUED | OUTPATIENT
Start: 2021-01-01 | End: 2021-01-01

## 2021-01-01 RX ORDER — MAGNESIUM OXIDE 400 MG/1
TABLET ORAL
COMMUNITY
Start: 2021-01-01

## 2021-01-01 RX ORDER — DOXYCYCLINE HYCLATE 100 MG
100 TABLET ORAL 2 TIMES DAILY
Qty: 14 TABLET | Refills: 0 | Status: SHIPPED | OUTPATIENT
Start: 2021-01-01 | End: 2021-01-01

## 2021-01-01 RX ORDER — MAGNESIUM OXIDE 400 MG/1
1 TABLET ORAL DAILY
Status: DISCONTINUED | OUTPATIENT
Start: 2021-01-01 | End: 2021-01-01 | Stop reason: CLARIF

## 2021-01-01 RX ORDER — POTASSIUM CHLORIDE 1.5 G/1.77G
20 POWDER, FOR SOLUTION ORAL 2 TIMES DAILY
Status: DISCONTINUED | OUTPATIENT
Start: 2021-01-01 | End: 2021-01-01 | Stop reason: HOSPADM

## 2021-01-01 RX ORDER — INSULIN GLARGINE 100 [IU]/ML
60 INJECTION, SOLUTION SUBCUTANEOUS NIGHTLY
Status: DISCONTINUED | OUTPATIENT
Start: 2021-01-01 | End: 2021-01-01

## 2021-01-01 RX ORDER — LEVETIRACETAM 500 MG/1
1000 TABLET ORAL 2 TIMES DAILY
Status: DISCONTINUED | OUTPATIENT
Start: 2021-01-01 | End: 2021-01-01 | Stop reason: HOSPADM

## 2021-01-01 RX ORDER — BUMETANIDE 2 MG/1
2 TABLET ORAL DAILY
Qty: 30 TABLET | Refills: 3
Start: 2021-01-01

## 2021-01-01 RX ORDER — INSULIN DEGLUDEC INJECTION 100 U/ML
30 INJECTION, SOLUTION SUBCUTANEOUS NIGHTLY
Qty: 5 PEN | Refills: 0 | Status: ON HOLD
Start: 2021-01-01 | End: 2021-01-01 | Stop reason: SDUPTHER

## 2021-01-01 RX ORDER — DOXYCYCLINE HYCLATE 100 MG/1
100 CAPSULE ORAL 2 TIMES DAILY
Qty: 20 CAPSULE | Refills: 0 | Status: SHIPPED | OUTPATIENT
Start: 2021-01-01 | End: 2021-01-01

## 2021-01-01 RX ORDER — DEXTROSE MONOHYDRATE 25 G/50ML
12.5 INJECTION, SOLUTION INTRAVENOUS PRN
Status: DISCONTINUED | OUTPATIENT
Start: 2021-01-01 | End: 2021-01-01 | Stop reason: HOSPADM

## 2021-01-01 RX ORDER — INSULIN GLULISINE 100 [IU]/ML
INJECTION, SOLUTION SUBCUTANEOUS
Qty: 15 ML | Refills: 3
Start: 2021-01-01

## 2021-01-01 RX ORDER — LANOLIN ALCOHOL/MO/W.PET/CERES
3 CREAM (GRAM) TOPICAL NIGHTLY PRN
Status: DISCONTINUED | OUTPATIENT
Start: 2021-01-01 | End: 2021-01-01

## 2021-01-01 RX ORDER — DULOXETIN HYDROCHLORIDE 60 MG/1
60 CAPSULE, DELAYED RELEASE ORAL DAILY
Qty: 30 CAPSULE | Refills: 0
Start: 2021-01-01

## 2021-01-01 RX ORDER — ACETAMINOPHEN 325 MG/1
650 TABLET ORAL EVERY 6 HOURS PRN
Status: DISCONTINUED | OUTPATIENT
Start: 2021-01-01 | End: 2021-01-01 | Stop reason: HOSPADM

## 2021-01-01 RX ORDER — DEXTROSE MONOHYDRATE 50 MG/ML
100 INJECTION, SOLUTION INTRAVENOUS PRN
Status: DISCONTINUED | OUTPATIENT
Start: 2021-01-01 | End: 2021-01-01 | Stop reason: HOSPADM

## 2021-01-01 RX ORDER — LEVETIRACETAM 1000 MG/1
TABLET ORAL
Qty: 180 TABLET | Refills: 3 | Status: SHIPPED | OUTPATIENT
Start: 2021-01-01

## 2021-01-01 RX ORDER — ONDANSETRON 2 MG/ML
4 INJECTION INTRAMUSCULAR; INTRAVENOUS EVERY 6 HOURS PRN
Status: DISCONTINUED | OUTPATIENT
Start: 2021-01-01 | End: 2021-01-01 | Stop reason: HOSPADM

## 2021-01-01 RX ORDER — POTASSIUM CHLORIDE 7.45 MG/ML
10 INJECTION INTRAVENOUS PRN
Status: DISCONTINUED | OUTPATIENT
Start: 2021-01-01 | End: 2021-01-01 | Stop reason: HOSPADM

## 2021-01-01 RX ORDER — FLUCONAZOLE 150 MG/1
150 TABLET ORAL EVERY OTHER DAY
Qty: 7 TABLET | Refills: 0 | Status: SHIPPED | OUTPATIENT
Start: 2021-01-01 | End: 2021-01-01

## 2021-01-01 RX ORDER — HYDROXYZINE HYDROCHLORIDE 25 MG/1
25 TABLET, FILM COATED ORAL EVERY 4 HOURS PRN
Status: DISCONTINUED | OUTPATIENT
Start: 2021-01-01 | End: 2021-01-01 | Stop reason: HOSPADM

## 2021-01-01 RX ORDER — DOXYCYCLINE HYCLATE 100 MG
100 TABLET ORAL 2 TIMES DAILY
COMMUNITY
End: 2021-01-01 | Stop reason: ALTCHOICE

## 2021-01-01 RX ORDER — POTASSIUM CHLORIDE 20 MEQ/1
40 TABLET, EXTENDED RELEASE ORAL PRN
Status: DISCONTINUED | OUTPATIENT
Start: 2021-01-01 | End: 2021-01-01 | Stop reason: HOSPADM

## 2021-01-01 RX ORDER — NICOTINE POLACRILEX 4 MG
15 LOZENGE BUCCAL PRN
Status: DISCONTINUED | OUTPATIENT
Start: 2021-01-01 | End: 2021-01-01 | Stop reason: HOSPADM

## 2021-01-01 RX ORDER — HYDROXYZINE HYDROCHLORIDE 10 MG/1
10 TABLET, FILM COATED ORAL 3 TIMES DAILY PRN
Status: DISCONTINUED | OUTPATIENT
Start: 2021-01-01 | End: 2021-01-01

## 2021-01-01 RX ORDER — DOXYCYCLINE HYCLATE 100 MG
100 TABLET ORAL 2 TIMES DAILY
Qty: 28 TABLET | Refills: 0 | Status: SHIPPED | OUTPATIENT
Start: 2021-01-01 | End: 2021-01-01

## 2021-01-01 RX ORDER — INSULIN DEGLUDEC INJECTION 100 U/ML
60 INJECTION, SOLUTION SUBCUTANEOUS NIGHTLY
Qty: 5 PEN | Refills: 0
Start: 2021-01-01

## 2021-01-01 RX ORDER — ATORVASTATIN CALCIUM 10 MG/1
10 TABLET, FILM COATED ORAL DAILY
Status: DISCONTINUED | OUTPATIENT
Start: 2021-01-01 | End: 2021-01-01 | Stop reason: HOSPADM

## 2021-01-01 RX ORDER — NYSTATIN 100000 [USP'U]/G
POWDER TOPICAL
Qty: 60 G | Refills: 1 | Status: SHIPPED | OUTPATIENT
Start: 2021-01-01 | End: 2021-01-01

## 2021-01-01 RX ORDER — BUMETANIDE 0.25 MG/ML
1 INJECTION, SOLUTION INTRAMUSCULAR; INTRAVENOUS EVERY 12 HOURS
Status: DISCONTINUED | OUTPATIENT
Start: 2021-01-01 | End: 2021-01-01 | Stop reason: HOSPADM

## 2021-01-01 RX ORDER — DOXYCYCLINE HYCLATE 100 MG
100 TABLET ORAL 2 TIMES DAILY
Qty: 60 TABLET | Refills: 0 | Status: ON HOLD | OUTPATIENT
Start: 2021-01-01 | End: 2021-01-01 | Stop reason: HOSPADM

## 2021-01-01 RX ORDER — FUROSEMIDE 20 MG/1
20 TABLET ORAL DAILY
Qty: 10 TABLET | Refills: 0 | Status: SHIPPED | OUTPATIENT
Start: 2021-01-01 | End: 2021-01-01 | Stop reason: ALTCHOICE

## 2021-01-01 RX ORDER — ALBUTEROL SULFATE 2.5 MG/3ML
2.5 SOLUTION RESPIRATORY (INHALATION) EVERY 4 HOURS PRN
Status: DISCONTINUED | OUTPATIENT
Start: 2021-01-01 | End: 2021-01-01 | Stop reason: HOSPADM

## 2021-01-01 RX ORDER — DOXYCYCLINE HYCLATE 100 MG
100 TABLET ORAL 2 TIMES DAILY
Qty: 8 TABLET | Refills: 0 | Status: SHIPPED | OUTPATIENT
Start: 2021-01-01 | End: 2021-01-01

## 2021-01-01 RX ORDER — POTASSIUM CHLORIDE 1.5 G/1.77G
20 POWDER, FOR SOLUTION ORAL 2 TIMES DAILY
COMMUNITY

## 2021-01-01 RX ORDER — CLOPIDOGREL BISULFATE 75 MG/1
75 TABLET ORAL DAILY
Status: DISCONTINUED | OUTPATIENT
Start: 2021-01-01 | End: 2021-01-01 | Stop reason: HOSPADM

## 2021-01-01 RX ORDER — METOPROLOL SUCCINATE 25 MG/1
25 TABLET, EXTENDED RELEASE ORAL DAILY
Qty: 30 TABLET | Refills: 5 | Status: ON HOLD
Start: 2021-01-01 | End: 2021-01-01 | Stop reason: HOSPADM

## 2021-01-01 RX ORDER — LOPERAMIDE HYDROCHLORIDE 2 MG/1
2 CAPSULE ORAL PRN
Status: DISCONTINUED | OUTPATIENT
Start: 2021-01-01 | End: 2021-01-01 | Stop reason: HOSPADM

## 2021-01-01 RX ORDER — TRIAMCINOLONE ACETONIDE 5 MG/G
CREAM TOPICAL
Qty: 15 G | Refills: 1 | Status: ON HOLD | OUTPATIENT
Start: 2021-01-01 | End: 2021-01-01 | Stop reason: HOSPADM

## 2021-01-01 RX ORDER — SODIUM CHLORIDE 0.9 % (FLUSH) 0.9 %
10 SYRINGE (ML) INJECTION EVERY 12 HOURS SCHEDULED
Status: DISCONTINUED | OUTPATIENT
Start: 2021-01-01 | End: 2021-01-01 | Stop reason: HOSPADM

## 2021-01-01 RX ORDER — LANOLIN ALCOHOL/MO/W.PET/CERES
3 CREAM (GRAM) TOPICAL NIGHTLY PRN
Status: DISCONTINUED | OUTPATIENT
Start: 2021-01-01 | End: 2021-01-01 | Stop reason: HOSPADM

## 2021-01-01 RX ORDER — DEXAMETHASONE 6 MG/1
6 TABLET ORAL 2 TIMES DAILY WITH MEALS
COMMUNITY

## 2021-01-01 RX ORDER — SPIRONOLACTONE 25 MG/1
25 TABLET ORAL DAILY
Status: DISCONTINUED | OUTPATIENT
Start: 2021-01-01 | End: 2021-01-01 | Stop reason: HOSPADM

## 2021-01-01 RX ADMIN — METOPROLOL TARTRATE 12.5 MG: 25 TABLET, FILM COATED ORAL at 22:00

## 2021-01-01 RX ADMIN — Medication 10 ML: at 10:30

## 2021-01-01 RX ADMIN — Medication 400 MG: at 09:03

## 2021-01-01 RX ADMIN — INSULIN GLARGINE 60 UNITS: 100 INJECTION, SOLUTION SUBCUTANEOUS at 22:02

## 2021-01-01 RX ADMIN — CLOPIDOGREL BISULFATE 75 MG: 75 TABLET ORAL at 09:49

## 2021-01-01 RX ADMIN — ATORVASTATIN CALCIUM 10 MG: 10 TABLET, FILM COATED ORAL at 08:09

## 2021-01-01 RX ADMIN — Medication 10 ML: at 21:47

## 2021-01-01 RX ADMIN — HYDROXYZINE HYDROCHLORIDE 25 MG: 25 TABLET, FILM COATED ORAL at 21:56

## 2021-01-01 RX ADMIN — SPIRONOLACTONE 25 MG: 25 TABLET, FILM COATED ORAL at 08:09

## 2021-01-01 RX ADMIN — VANCOMYCIN HYDROCHLORIDE 2500 MG: 1.5 INJECTION, POWDER, LYOPHILIZED, FOR SOLUTION INTRAVENOUS at 18:44

## 2021-01-01 RX ADMIN — ENOXAPARIN SODIUM 40 MG: 40 INJECTION SUBCUTANEOUS at 13:48

## 2021-01-01 RX ADMIN — Medication 400 MG: at 08:09

## 2021-01-01 RX ADMIN — ENOXAPARIN SODIUM 40 MG: 40 INJECTION SUBCUTANEOUS at 09:49

## 2021-01-01 RX ADMIN — BUMETANIDE 1 MG: 0.25 INJECTION INTRAMUSCULAR; INTRAVENOUS at 18:45

## 2021-01-01 RX ADMIN — ATORVASTATIN CALCIUM 10 MG: 10 TABLET, FILM COATED ORAL at 08:50

## 2021-01-01 RX ADMIN — Medication 3 MG: at 21:56

## 2021-01-01 RX ADMIN — LEVETIRACETAM 1000 MG: 500 TABLET ORAL at 21:03

## 2021-01-01 RX ADMIN — LEVETIRACETAM 1000 MG: 500 TABLET ORAL at 22:20

## 2021-01-01 RX ADMIN — LEVETIRACETAM 1000 MG: 500 TABLET ORAL at 09:03

## 2021-01-01 RX ADMIN — LEVETIRACETAM 1000 MG: 500 TABLET ORAL at 22:00

## 2021-01-01 RX ADMIN — BUMETANIDE 1 MG: 0.25 INJECTION INTRAMUSCULAR; INTRAVENOUS at 16:40

## 2021-01-01 RX ADMIN — HYDROCODONE BITARTRATE AND ACETAMINOPHEN 1 TABLET: 5; 325 TABLET ORAL at 21:57

## 2021-01-01 RX ADMIN — ATORVASTATIN CALCIUM 10 MG: 10 TABLET, FILM COATED ORAL at 09:04

## 2021-01-01 RX ADMIN — METOPROLOL TARTRATE 12.5 MG: 25 TABLET, FILM COATED ORAL at 21:47

## 2021-01-01 RX ADMIN — POTASSIUM CHLORIDE 20 MEQ: 1.5 FOR SOLUTION ORAL at 22:18

## 2021-01-01 RX ADMIN — POTASSIUM CHLORIDE 20 MEQ: 1.5 FOR SOLUTION ORAL at 09:58

## 2021-01-01 RX ADMIN — Medication 10 ML: at 21:59

## 2021-01-01 RX ADMIN — HYDROCODONE BITARTRATE AND ACETAMINOPHEN 1 TABLET: 5; 325 TABLET ORAL at 02:56

## 2021-01-01 RX ADMIN — ATORVASTATIN CALCIUM 10 MG: 10 TABLET, FILM COATED ORAL at 09:05

## 2021-01-01 RX ADMIN — METOPROLOL TARTRATE 12.5 MG: 25 TABLET, FILM COATED ORAL at 08:09

## 2021-01-01 RX ADMIN — VANCOMYCIN HYDROCHLORIDE 1500 MG: 1.5 INJECTION, POWDER, LYOPHILIZED, FOR SOLUTION INTRAVENOUS at 01:01

## 2021-01-01 RX ADMIN — Medication 400 MG: at 08:49

## 2021-01-01 RX ADMIN — HYDROXYZINE HYDROCHLORIDE 25 MG: 25 TABLET, FILM COATED ORAL at 02:56

## 2021-01-01 RX ADMIN — ATORVASTATIN CALCIUM 10 MG: 10 TABLET, FILM COATED ORAL at 09:49

## 2021-01-01 RX ADMIN — BUMETANIDE 1 MG: 0.25 INJECTION INTRAMUSCULAR; INTRAVENOUS at 05:17

## 2021-01-01 RX ADMIN — POTASSIUM CHLORIDE 20 MEQ: 1.5 FOR SOLUTION ORAL at 08:49

## 2021-01-01 RX ADMIN — CETIRIZINE HYDROCHLORIDE 10 MG: 10 TABLET, FILM COATED ORAL at 13:48

## 2021-01-01 RX ADMIN — Medication 3 MG: at 22:42

## 2021-01-01 RX ADMIN — CETIRIZINE HYDROCHLORIDE 5 MG: 5 TABLET ORAL at 09:13

## 2021-01-01 RX ADMIN — LEVETIRACETAM 1000 MG: 500 TABLET ORAL at 21:47

## 2021-01-01 RX ADMIN — Medication 10 ML: at 09:50

## 2021-01-01 RX ADMIN — CLOPIDOGREL BISULFATE 75 MG: 75 TABLET ORAL at 09:04

## 2021-01-01 RX ADMIN — SPIRONOLACTONE 25 MG: 25 TABLET, FILM COATED ORAL at 08:49

## 2021-01-01 RX ADMIN — DEXTROSE 15 G: 15 GEL ORAL at 16:22

## 2021-01-01 RX ADMIN — SPIRONOLACTONE 25 MG: 25 TABLET, FILM COATED ORAL at 09:49

## 2021-01-01 RX ADMIN — Medication 400 MG: at 09:05

## 2021-01-01 RX ADMIN — BUMETANIDE 1 MG: 0.25 INJECTION INTRAMUSCULAR; INTRAVENOUS at 05:30

## 2021-01-01 RX ADMIN — POTASSIUM CHLORIDE 20 MEQ: 1.5 FOR SOLUTION ORAL at 21:59

## 2021-01-01 RX ADMIN — CLOPIDOGREL BISULFATE 75 MG: 75 TABLET ORAL at 08:09

## 2021-01-01 RX ADMIN — Medication 10 ML: at 10:12

## 2021-01-01 RX ADMIN — POTASSIUM CHLORIDE 20 MEQ: 1.5 FOR SOLUTION ORAL at 22:45

## 2021-01-01 RX ADMIN — LEVETIRACETAM 1000 MG: 500 TABLET ORAL at 21:57

## 2021-01-01 RX ADMIN — SPIRONOLACTONE 25 MG: 25 TABLET, FILM COATED ORAL at 09:04

## 2021-01-01 RX ADMIN — HYDROCODONE BITARTRATE AND ACETAMINOPHEN 1 TABLET: 5; 325 TABLET ORAL at 22:20

## 2021-01-01 RX ADMIN — BUMETANIDE 1 MG: 0.25 INJECTION INTRAMUSCULAR; INTRAVENOUS at 05:21

## 2021-01-01 RX ADMIN — HYDROXYZINE HYDROCHLORIDE 25 MG: 25 TABLET, FILM COATED ORAL at 06:31

## 2021-01-01 RX ADMIN — Medication 400 MG: at 09:49

## 2021-01-01 RX ADMIN — CETIRIZINE HYDROCHLORIDE 5 MG: 5 TABLET ORAL at 08:49

## 2021-01-01 RX ADMIN — Medication 3 MG: at 21:47

## 2021-01-01 RX ADMIN — LEVETIRACETAM 1000 MG: 500 TABLET ORAL at 09:05

## 2021-01-01 RX ADMIN — BUMETANIDE 1 MG: 0.25 INJECTION INTRAMUSCULAR; INTRAVENOUS at 05:45

## 2021-01-01 RX ADMIN — BUMETANIDE 1 MG: 0.25 INJECTION INTRAMUSCULAR; INTRAVENOUS at 17:06

## 2021-01-01 RX ADMIN — LEVETIRACETAM 1000 MG: 500 TABLET ORAL at 09:49

## 2021-01-01 RX ADMIN — LEVETIRACETAM 1000 MG: 500 TABLET ORAL at 08:49

## 2021-01-01 RX ADMIN — CETIRIZINE HYDROCHLORIDE 5 MG: 5 TABLET ORAL at 10:06

## 2021-01-01 RX ADMIN — HYDROXYZINE HYDROCHLORIDE 25 MG: 25 TABLET, FILM COATED ORAL at 17:21

## 2021-01-01 RX ADMIN — METOPROLOL TARTRATE 12.5 MG: 25 TABLET, FILM COATED ORAL at 09:03

## 2021-01-01 RX ADMIN — LEVETIRACETAM 1000 MG: 500 TABLET ORAL at 08:09

## 2021-01-01 RX ADMIN — SPIRONOLACTONE 25 MG: 25 TABLET, FILM COATED ORAL at 09:05

## 2021-01-01 RX ADMIN — METOPROLOL TARTRATE 12.5 MG: 25 TABLET, FILM COATED ORAL at 09:50

## 2021-01-01 RX ADMIN — CLOPIDOGREL BISULFATE 75 MG: 75 TABLET ORAL at 08:50

## 2021-01-01 RX ADMIN — POTASSIUM CHLORIDE 20 MEQ: 1.5 FOR SOLUTION ORAL at 22:30

## 2021-01-01 RX ADMIN — METOPROLOL TARTRATE 12.5 MG: 25 TABLET, FILM COATED ORAL at 09:05

## 2021-01-01 RX ADMIN — BUMETANIDE 1 MG: 0.25 INJECTION INTRAMUSCULAR; INTRAVENOUS at 06:12

## 2021-01-01 RX ADMIN — BUMETANIDE 1 MG: 0.25 INJECTION INTRAMUSCULAR; INTRAVENOUS at 17:21

## 2021-01-01 RX ADMIN — VANCOMYCIN HYDROCHLORIDE 1750 MG: 1.25 INJECTION, POWDER, LYOPHILIZED, FOR SOLUTION INTRAVENOUS at 06:21

## 2021-01-01 RX ADMIN — Medication 10 ML: at 21:00

## 2021-01-01 RX ADMIN — HYDROCODONE BITARTRATE AND ACETAMINOPHEN 1 TABLET: 5; 325 TABLET ORAL at 22:42

## 2021-01-01 RX ADMIN — BUMETANIDE 1 MG: 0.25 INJECTION INTRAMUSCULAR; INTRAVENOUS at 16:46

## 2021-01-01 RX ADMIN — ENOXAPARIN SODIUM 40 MG: 40 INJECTION SUBCUTANEOUS at 08:50

## 2021-01-01 RX ADMIN — CLOPIDOGREL BISULFATE 75 MG: 75 TABLET ORAL at 09:05

## 2021-01-01 RX ADMIN — POTASSIUM CHLORIDE 20 MEQ: 1.5 FOR SOLUTION ORAL at 21:04

## 2021-01-01 ASSESSMENT — ENCOUNTER SYMPTOMS
RHINORRHEA: 0
COLOR CHANGE: 1
SHORTNESS OF BREATH: 0
WHEEZING: 0
DIARRHEA: 0
TROUBLE SWALLOWING: 0
ABDOMINAL PAIN: 0
VOMITING: 0
VOMITING: 0
EYE DISCHARGE: 0
ABDOMINAL PAIN: 0
SHORTNESS OF BREATH: 0
DIARRHEA: 0
SORE THROAT: 0
SORE THROAT: 0
COLOR CHANGE: 1
VOMITING: 0
COUGH: 0
COLOR CHANGE: 1
COUGH: 0
SINUS PRESSURE: 0
COUGH: 0
COLOR CHANGE: 0
SHORTNESS OF BREATH: 0
DIARRHEA: 0
SINUS PRESSURE: 0
CONSTIPATION: 0
NAUSEA: 0
EYE PAIN: 0
EYE REDNESS: 0
RHINORRHEA: 0
NAUSEA: 0
COUGH: 1
SHORTNESS OF BREATH: 0
CHEST TIGHTNESS: 0
VOMITING: 0
NAUSEA: 1
EYE PAIN: 0
COUGH: 0
DIARRHEA: 0
FACIAL SWELLING: 0
CHEST TIGHTNESS: 0
EYE DISCHARGE: 0
RHINORRHEA: 0
EYE REDNESS: 0
SHORTNESS OF BREATH: 0
NAUSEA: 0
BLOOD IN STOOL: 0
CONSTIPATION: 0
BACK PAIN: 1
EYE REDNESS: 0
NAUSEA: 0
WHEEZING: 0

## 2021-01-01 ASSESSMENT — PAIN SCALES - GENERAL
PAINLEVEL_OUTOF10: 0

## 2021-01-01 ASSESSMENT — PAIN DESCRIPTION - ORIENTATION
ORIENTATION: RIGHT;LEFT
ORIENTATION: RIGHT;LEFT

## 2021-01-01 ASSESSMENT — PAIN SCALES - WONG BAKER
WONGBAKER_NUMERICALRESPONSE: 2
WONGBAKER_NUMERICALRESPONSE: 4
WONGBAKER_NUMERICALRESPONSE: 2
WONGBAKER_NUMERICALRESPONSE: 2

## 2021-01-01 ASSESSMENT — PATIENT HEALTH QUESTIONNAIRE - PHQ9
2. FEELING DOWN, DEPRESSED OR HOPELESS: 0
SUM OF ALL RESPONSES TO PHQ QUESTIONS 1-9: 0

## 2021-01-01 ASSESSMENT — PAIN DESCRIPTION - LOCATION
LOCATION: GENERALIZED
LOCATION: LEG
LOCATION: LEG;ANKLE;FOOT
LOCATION: GENERALIZED
LOCATION: LEG
LOCATION: HAND;LEG
LOCATION: HAND

## 2021-01-01 ASSESSMENT — PAIN - FUNCTIONAL ASSESSMENT: PAIN_FUNCTIONAL_ASSESSMENT: ACTIVITIES ARE NOT PREVENTED

## 2021-01-01 ASSESSMENT — PAIN DESCRIPTION - PAIN TYPE
TYPE: ACUTE PAIN

## 2021-01-01 ASSESSMENT — PAIN DESCRIPTION - FREQUENCY
FREQUENCY: CONTINUOUS
FREQUENCY: CONTINUOUS

## 2021-01-01 ASSESSMENT — PAIN DESCRIPTION - DESCRIPTORS: DESCRIPTORS: ACHING;SORE

## 2021-01-01 ASSESSMENT — PAIN DESCRIPTION - PROGRESSION: CLINICAL_PROGRESSION: NOT CHANGED

## 2021-01-02 NOTE — TELEPHONE ENCOUNTER
Pt's daughter Jarrod Petersen paged me on Sat 1/2- Pt gave me permission to talk to Yonis Crocker, but please update HIPPA at appt 1/5/21. Jarrod Petersen says pt has recurrent issues with leg cellulitis. Was seeing Wound Care at [facility] before until she had a pacemaker put in recently. Daughter thinks pt is developing cellulitis again- leg feels hot and looks more red (left leg worse than right). Pt was discharged from [facility] on 12/27 on Bactrim for UTI, but pt stopped the antibiotic yesterday because she is getting a rash on her arms so they are worried about an allergic reaction. Jarrod Petersen said pt's urine looked clear today and pt hasn't been c/o dysuria. Can stop Bactrim d/t possible allergy and I started doxycycline x 10 days for cellulitis, but pt will need repeat UA at appt on 1/5. [address] call or take pt to ED for fever or spreading erythema. Kyra Graft- please check rash on arms, suspected cellulitis on leg(s), and UA at appt on 1/5/21.  Thanks

## 2021-01-05 NOTE — PROGRESS NOTES
[address] [facility] PRIMARY CARE  [address] [facility] [place] [address]  Dept: [address] is a 79 y.o. female who presents today for her medical conditions/complaintsas noted below. Chief Complaint   Patient presents with    Other     pt was in nursing facility        HPI:     HPI: The patient had a pace maker placed in the last month. The patient has had lower extremity edema for a very long time however staff noticed that her leg was getting increasingly red warm and swollen so she was started on Bactrim and patient also had a UTI. After starting Bactrim the patient broke out into a rash and the Bactrim was stopped prematurely. The patient was then started on doxycycline for 10 days to further treat the cellulitis. The patient was discharged home with 24-hour care from a long-term care facility and is having her legs wrapped 3 times a week. Patient is unsure if her legs are improving or not. Patient's caregiver is unsure as she has only known the patient for about a week.       LDL Cholesterol (mg/dL)   Date Value   06/24/2016 72   12/01/2015 68     LDL Calculated (mg/dL)   Date Value   03/11/2019 88   07/06/2017 76       (goal LDL is <100)   AST (U/L)   Date Value   08/11/2020 17     ALT (U/L)   Date Value   08/11/2020 16     BUN (mg/dL)   Date Value   08/11/2020 33 (H)     BP Readings from Last 3 Encounters:   01/05/21 122/74   08/31/20 115/70   08/13/20 102/62          (goal 120/80)    Past Medical History:   Diagnosis Date    Acute upper respiratory infection     Arthritis 6/23/2017    Backache     Bell's palsy     Benign hypertension with CKD (chronic kidney disease) stage III 7/15/2020    Cellulitis     Cellulitis 8/11/2020    Cellulitis of left lower extremity 7/26/2019    CKD (chronic kidney disease) stage 3, GFR 30-59 ml/min 6/15/2020    Cough     Diabetes mellitus [facility]     Diarrhea 12/8/2015  H/O acute bronchitis with bronchospasm     H/O acute sinusitis     H/O allergic reaction     H/O allergic rhinitis     H/O tinea cruris     Hx of heart artery stent     heart stents x 4    Hyperlipidemia     Joint pain, hip     MI (myocardial infarction) ([address]     Morbid obesity ([address]     Otitis externa     Subarachnoid bleed ([facility] march 28, 2016    Tendonitis, bicipital       Past Surgical History:   Procedure Laterality Date    CARDIAC CATHETERIZATION      heart stents x 4    CATARACT REMOVAL WITH IMPLANT [facility]01[facility]2018    [facility]    CATARACT REMOVAL WITH IMPLANT Right 03/05/2019    [facility]    INTRACAPSULAR CATARACT EXTRACTION Right 3/5/2019    EYE CATARACT EMULSIFICATION IOL IMPLANT performed by Jordyn Lamb MD at [facility] Left     ND XCAPSL CTRC RMVL INSJ IO LENS PROSTH W/O ECP Left 11/1/2018    EYE CATARACT EMULSIFICATION IOL IMPLANT performed by Jordyn Lamb MD at [address] [place] [address]  12/9/15       Family History   Problem Relation Age of Onset    Diabetes Father     Hypertension Father     Cancer Father         colon cancer    Diabetes Mother     Heart Disease Mother     Diabetes Brother     Heart Disease Brother        Social History     Tobacco Use    Smoking status: Never Smoker    Smokeless tobacco: Never Used   Substance Use Topics    Alcohol use: No     Alcohol/week: 0.0 standard drinks     Comment: one glass of wine per year      Current Outpatient Medications   Medication Sig Dispense Refill    triamcinolone (ARISTOCORT) 0.5 % cream Apply topically 3 times daily.  15 g 1    doxycycline hyclate (VIBRA-TABS) 100 MG tablet Take 1 tablet by mouth 2 times daily for 4 days 8 tablet 0    furosemide (LASIX) 20 MG tablet Take 1 tablet by mouth daily 10 tablet 0  doxycycline hyclate (VIBRAMYCIN) 100 MG capsule Take 1 capsule by mouth 2 times daily for 10 days Take with food, but avoid dairy, calcium and MTV's 2 hours before and after the dose 20 capsule 0    BD PEN NEEDLE KRISTEN U/F 32G X 4 MM MISC       simvastatin (ZOCOR) 20 MG tablet TAKE 1 TABLET NIGHTLY 90 tablet 3    APIDRA SOLOSTAR 100 UNIT/ML injection pen INJECT 15 UNITS SUBCUTANEOUSLY FOUR TIMES A DAY (BEFORE MEALS AND NIGHTLY) 15 mL 3    sodium chloride (ALTAMIST SPRAY) 0.65 % nasal spray 1 spray by Nasal route 2 times daily 1 Bottle 3    spironolactone (ALDACTONE) 25 MG tablet TAKE 1 TABLET DAILY 90 tablet 3    TRESIBA FLEXTOUCH 100 UNIT/ML SOPN Inject 70 Units into the skin nightly Patient is taking 60 units nightly 5 pen 0    levETIRAcetam (KEPPRA) 1000 MG tablet TAKE 1 TABLET TWICE A  tablet 3    Clobetasol Propionate 0.05 % LIQD Once daily for not more than 2 weeks at a time.  1 Bottle 0    acetaminophen (TYLENOL) 325 MG tablet Take 2 tablets by mouth every 6 hours as needed for Pain 40 tablet 0    Ginger, Zingiber officinalis, (GINGER PO) Take by mouth      Multiple Vitamins-Minerals (MULTIVITAMIN WOMEN PO) Take by mouth      albuterol sulfate HFA (PROVENTIL HFA) 108 (90 Base) MCG/ACT inhaler Inhale 2 puffs into the lungs every 6 hours as needed for Shortness of Breath (cough) 1 Inhaler 3    calcium carbonate (OYSTER SHELL CALCIUM 500 MG) 1250 (500 Ca) MG tablet TAKE 1 TABLET BY MOUTH ONE TIME A DAY  30 tablet 1    triamcinolone (KENALOG) 0.025 % ointment Apply 1 each topically 2 times daily as needed  1    Glucose Blood (BLOOD GLUCOSE TEST STRIPS) STRP 1 strip by Does not apply route 4 times daily Indications: Mariam strips 200 strip 3    furosemide (LASIX) 40 MG tablet Take 40 mg by mouth daily Prescribed by Dr. Corwin Stephens (APPLE CIDER VINEGAR) TABS Take by mouth  vitamin D (ERGOCALCIFEROL) 08541 UNITS CAPS capsule TAKE 1 CAPSULE BY MOUTH ONE TIME A WEEK  4 capsule 2    clopidogrel (PLAVIX) 75 MG tablet Take 1 tablet by mouth daily 90 tablet 3    metoprolol tartrate (LOPRESSOR) 25 MG tablet Take 0.5 tablets by mouth 2 times daily Indications: per Cardiologist's order 90 tablet 3    loperamide (IMODIUM) 2 MG capsule Take 1 capsule by mouth as needed for Diarrhea 30 capsule 1    Insulin Syringe-Needle U-100 (INSULIN SYRINGE .5CC/31GX5/16\") 31G X 5/16\" 0.5 ML MISC 1 each by Does not apply route daily 100 each 3    glipiZIDE (GLUCOTROL) 10 MG tablet Take 1 tablet by mouth every morning (before breakfast) 90 tablet 3    aspirin 81 MG chewable tablet Take 1 tablet by mouth daily (Patient taking differently: Take 81 mg by mouth 2 times daily ) 30 tablet 3     No current facility-administered medications for this visit. Allergies   Allergen Reactions    Lantus [Insulin Glargine]      Hives,itching    Metformin And Related Diarrhea    Prednisone Swelling     Facial swelling    Sertraline Other (See Comments)     hallucination    Amoxicillin Diarrhea and Nausea And Vomiting    Bactrim [Sulfamethoxazole-Trimethoprim] Rash     Rash on arms    Eggs Or Egg-Derived Products Rash    Novolog [Insulin Aspart] Itching and Rash    Zinc Itching and Rash       Health Maintenance   Topic Date Due    Shingles Vaccine (1 of 2) 03/02/1989    DEXA (modify frequency per FRAX score)  03/02/1994    DTaP/Tdap/Td vaccine (2 - Td) 09/14/2010    Annual Wellness Visit (AWV)  05/29/2019    Lipid screen  03/11/2020    Flu vaccine (1) 01/05/2022 (Originally 9/1/2020)    Potassium monitoring  08/11/2021    Creatinine monitoring  08/11/2021    Pneumococcal 65+ years Vaccine  Completed    Hepatitis A vaccine  Aged Out    Hib vaccine  Aged Out    Meningococcal (ACWY) vaccine  Aged Out       Subjective:      Review of Systems   Cardiovascular: Positive for leg swelling. Genitourinary: Negative for dysuria. Skin: Positive for color change (warm and red blister on leg. Pictures were taken ) and rash. Objective:     /74   Pulse 81   Temp 97.4 °F (36.3 °C)   Ht 5' (1.524 m)   Wt 265 lb 6.4 oz (120.4 kg)   SpO2 97%   BMI 51.83 kg/m²   Physical Exam  Vitals signs reviewed. Constitutional:       Appearance: She is obese. She is ill-appearing. Diaphoretic: n wheel chair. Musculoskeletal:      Right lower leg: Edema (3+) present. Left lower leg: Edema (4+ picture taken ) present. Skin:     Findings: Erythema present. Assessment:       Diagnosis Orders   1. Dysuria  POCT Urinalysis No Micro (Auto)    [place] DISCHARGE MEDS RECONCILED W/ CURRENT OUTPATIENT MED LIST   2. Rash  triamcinolone (ARISTOCORT) 0.5 % cream    [place] DISCHARGE MEDS RECONCILED W/ CURRENT OUTPATIENT MED LIST   3. Lower extremity edema  doxycycline hyclate (VIBRA-TABS) 100 MG tablet    furosemide (LASIX) 20 MG tablet    Basic Metabolic Panel    [place] DISCHARGE MEDS RECONCILED W/ CURRENT OUTPATIENT MED LIST   4. Cellulitis of left lower extremity  Basic Metabolic Panel    [place] DISCHARGE MEDS RECONCILED W/ CURRENT OUTPATIENT MED LIST        Plan:       Patient was given triamcinolone 0.5% cream to place on rash on arms 3 times daily. Doxycycline was extended for 4 more days to give a full 2-week course. Patient and caregiver educated to continue with compression wraps 3 times weekly and continue with leg elevation. After consulting with Dr. Anna Morales he suggested increasing Lasix to 60 mg daily for the next 10 days. Repeat BMP in 1 week to monitor kidney function. Follow-up in 2 weeks for recheck of leg wound with either Brett or Dr. Navarro [place] urinalysis done today in order to determine if UTI have been treated. Medications reconciled however patient was unsure of all medications she is taking. Educated to bring a list with her to her next appointment. Return in 2 weeks (on 1/19/2021). Orders Placed This Encounter   Procedures    Basic Metabolic Panel     Standing Status:   Future     Standing Expiration Date:   1/5/2022    POCT Urinalysis No Micro (Auto)    CT DISCHARGE MEDS RECONCILED W/ CURRENT OUTPATIENT MED LIST     Orders Placed This Encounter   Medications    triamcinolone (ARISTOCORT) 0.5 % cream     Sig: Apply topically 3 times daily. Dispense:  15 g     Refill:  1    doxycycline hyclate (VIBRA-TABS) 100 MG tablet     Sig: Take 1 tablet by mouth 2 times daily for 4 days     Dispense:  8 tablet     Refill:  0    furosemide (LASIX) 20 MG tablet     Sig: Take 1 tablet by mouth daily     Dispense:  10 tablet     Refill:  0       Patient given educationalmaterials - see patient instructions. Discussed use, benefit, and side effectsof prescribed medications. All patient questions answered. Pt voiced understanding. Reviewed health maintenance. Instructed to continue current medications, diet andexercise. Patient agreed with treatment plan. Follow up as directed.      Electronicallysigned by SANDIE Reyes on 1/5/2021 at 2:46 PM

## 2021-01-16 NOTE — TELEPHONE ENCOUNTER
Yolis from [address] [place] [address] paged on Saturday 1/16: Says pt's family is asking for order for UA d/t increased confusion. Says her glucose is ok, denies recent seizure. She does not think pt has known dementia. Pt just had recent labs on 1/7/21. I put in order for UA.

## 2021-01-19 NOTE — PROGRESS NOTES
[facility] PRIMARY CARE  [address] [facility] [address]  Dept: [address] is a 79 y.o. female who presents today for her medical conditions/complaintsas noted below. Chief Complaint   Patient presents with    Edema     Bilateral feet edema and drainage     Fall     Patient fell 1/16/21.  Wound Check     LUE       HPI:     HPI: Wilfrido Winchester is a pleasant 79-year-old female who presents today with a home health nurse for recheck of leg swelling and cellulitis. The patient was started on doxy and Lasix was increased patient was having pressure wrappings done by home nurse 3 times weekly. Patient was treated over the last week and return today for recheck. Patient's legs are still swollen and seeping fluid however redness has decreased. Patient still is not having any fevers. Patient states she has been compliant with her treatment. Of note patient only has 1 kidney and is being seen by Dr. Solomon Shah for this CKD stage 3.      LDL Cholesterol (mg/dL)   Date Value   06/24/2016 72   12/01/2015 68     LDL Calculated (mg/dL)   Date Value   03/11/2019 88   07/06/2017 76       (goal LDL is <100)   AST (U/L)   Date Value   01/07/2021 22     ALT (U/L)   Date Value   01/07/2021 18     BUN (mg/dL)   Date Value   01/07/2021 22     BP Readings from Last 3 Encounters:   01/19/21 128/72   01/08/21 130/76   01/05/21 122/74          (goal 120/80)    Past Medical History:   Diagnosis Date    Acute upper respiratory infection     Arthritis 6/23/2017    Backache     Bell's palsy     Benign hypertension with CKD (chronic kidney disease) stage III 7/15/2020    Cellulitis     Cellulitis 8/11/2020    Cellulitis of left lower extremity 7/26/2019    Chronic diastolic heart failure (HCC)     Chronic kidney disease, stage III (moderate)     CKD (chronic kidney disease) stage 3, GFR 30-59 ml/min 6/15/2020    Coronary artery disease     Cough  Diabetes ([address]     Diabetes mellitus ([address]     Diarrhea 12/8/2015    H/O acute bronchitis with bronchospasm     H/O acute sinusitis     H/O allergic reaction     H/O allergic rhinitis     H/O tinea cruris     Hx of heart artery stent     heart stents x 4    Hyperlipidemia     Hypertension     Joint pain, hip     LVH (left ventricular hypertrophy)     MI (myocardial infarction) ([address]     Morbid obesity ([address]     Otitis externa     Pulmonary hypertension ([address]     Right bundle branch block     Seizure disorder ([address]     Late effects of CVA    Subarachnoid bleed ([address]     Subarachnoid bleed ([address] march 28, 2016    Tendonitis, bicipital       Past Surgical History:   Procedure Laterality Date    CARDIAC CATHETERIZATION      heart stents x 4    CATARACT REMOVAL      Multiple surgeries    CATARACT REMOVAL WITH IMPLANT [facility] 11/01/2018    [facility]    CATARACT REMOVAL WITH IMPLANT Right 03/05/2019    [facility]    INTRACAPSULAR CATARACT EXTRACTION Right 3/5/2019    EYE CATARACT EMULSIFICATION IOL IMPLANT performed by See Rasmussen MD at [address] [place] [address]     PACEMAKER PLACEMENT      AR XCAPSL CTRC RMVL INSJ IO LENS PROSTH W/O ECP Left 11/1/2018    EYE CATARACT EMULSIFICATION IOL IMPLANT performed by See Rasmussen MD at [place] [address]  12/9/15       Family History   Problem Relation Age of Onset    Diabetes Father     Hypertension Father     Cancer Father         colon cancer    Diabetes Mother     Heart Disease Mother     Diabetes Brother     Heart Disease Brother     Colon Cancer Father        Social History     Tobacco Use    Smoking status: Never Smoker    Smokeless tobacco: Never Used   Substance Use Topics    Alcohol use: No     Alcohol/week: 0.0 standard drinks     Comment: one glass of wine per year      Current Outpatient Medications   Medication Sig Dispense Refill  nystatin (MYCOSTATIN) 149957 UNIT/GM powder Apply 3 times daily. 60 g 1    Magnesium 400 MG TABS Take 400 mg/day by mouth daily 30 tablet 1    triamcinolone (ARISTOCORT) 0.5 % cream Apply topically 3 times daily. 15 g 1    aspirin 81 MG EC tablet Take 1 tablet by mouth daily 30 tablet 3    atorvastatin (LIPITOR) 10 MG tablet Take 1 tablet by mouth daily 30 tablet 3    clopidogrel (PLAVIX) 75 MG tablet Take 1 tablet by mouth daily 30 tablet 3    levETIRAcetam (KEPPRA) 1000 MG tablet Take 1 tablet by mouth 2 times daily 60 tablet 3    BD PEN NEEDLE KRISTEN U/F 32G X 4 MM MISC       simvastatin (ZOCOR) 20 MG tablet TAKE 1 TABLET NIGHTLY 90 tablet 3    APIDRA SOLOSTAR 100 UNIT/ML injection pen INJECT 15 UNITS SUBCUTANEOUSLY FOUR TIMES A DAY (BEFORE MEALS AND NIGHTLY) 15 mL 3    sodium chloride (ALTAMIST SPRAY) 0.65 % nasal spray 1 spray by Nasal route 2 times daily 1 Bottle 3    spironolactone (ALDACTONE) 25 MG tablet TAKE 1 TABLET DAILY 90 tablet 3    TRESIBA FLEXTOUCH 100 UNIT/ML SOPN Inject 70 Units into the skin nightly Patient is taking 60 units nightly 5 pen 0    levETIRAcetam (KEPPRA) 1000 MG tablet TAKE 1 TABLET TWICE A  tablet 3    Clobetasol Propionate 0.05 % LIQD Once daily for not more than 2 weeks at a time.  1 Bottle 0    acetaminophen (TYLENOL) 325 MG tablet Take 2 tablets by mouth every 6 hours as needed for Pain 40 tablet 0    Ginger, Zingiber officinalis, (GINGER PO) Take by mouth      Multiple Vitamins-Minerals (MULTIVITAMIN WOMEN PO) Take by mouth      albuterol sulfate HFA (PROVENTIL HFA) 108 (90 Base) MCG/ACT inhaler Inhale 2 puffs into the lungs every 6 hours as needed for Shortness of Breath (cough) 1 Inhaler 3    calcium carbonate (OYSTER SHELL CALCIUM 500 MG) 1250 (500 Ca) MG tablet TAKE 1 TABLET BY MOUTH ONE TIME A DAY  30 tablet 1    triamcinolone (KENALOG) 0.025 % ointment Apply 1 each topically 2 times daily as needed  1  Glucose Blood (BLOOD GLUCOSE TEST STRIPS) STRP 1 strip by Does not apply route 4 times daily Indications: Mariam strips 200 strip 3    furosemide (LASIX) 40 MG tablet Take 40 mg by mouth daily Prescribed by Dr. Seth Bond (APPLE CIDER VINEGAR) TABS Take by mouth      vitamin D (ERGOCALCIFEROL) 64735 UNITS CAPS capsule TAKE 1 CAPSULE BY MOUTH ONE TIME A WEEK  4 capsule 2    clopidogrel (PLAVIX) 75 MG tablet Take 1 tablet by mouth daily 90 tablet 3    metoprolol tartrate (LOPRESSOR) 25 MG tablet Take 0.5 tablets by mouth 2 times daily Indications: per Cardiologist's order 90 tablet 3    loperamide (IMODIUM) 2 MG capsule Take 1 capsule by mouth as needed for Diarrhea 30 capsule 1    Insulin Syringe-Needle U-100 (INSULIN SYRINGE .5CC/31GX5/16\") 31G X 5/16\" 0.5 ML MISC 1 each by Does not apply route daily 100 each 3    glipiZIDE (GLUCOTROL) 10 MG tablet Take 1 tablet by mouth every morning (before breakfast) 90 tablet 3    aspirin 81 MG chewable tablet Take 1 tablet by mouth daily (Patient taking differently: Take 81 mg by mouth 2 times daily ) 30 tablet 3     No current facility-administered medications for this visit.       Allergies   Allergen Reactions    Lantus [Insulin Glargine]      Hives,itching    Metformin And Related Diarrhea    Prednisone Swelling     Facial swelling    Sertraline Other (See Comments)     hallucination    Amoxicillin Diarrhea and Nausea And Vomiting    Bactrim [Sulfamethoxazole-Trimethoprim] Rash     Rash on arms    Eggs Or Egg-Derived Products Rash    Novolog [Insulin Aspart] Itching and Rash    Zinc Itching and Rash       Health Maintenance   Topic Date Due    COVID-19 Vaccine (1 of 2) 03/02/1956    Shingles Vaccine (1 of 2) 03/02/1989    DEXA (modify frequency per FRAX score)  03/02/1994    DTaP/Tdap/Td vaccine (2 - Td) 09/14/2010    Lipid screen  03/11/2020    Annual Wellness Visit (AWV)  12/02/2020  Flu vaccine (1) 01/05/2022 (Originally 9/1/2020)    Potassium monitoring  01/07/2022    Creatinine monitoring  01/07/2022    Pneumococcal 65+ years Vaccine  Completed    Hepatitis A vaccine  Aged Out    Hib vaccine  Aged Out    Meningococcal (ACWY) vaccine  Aged Out       Subjective:      Review of Systems    Objective:     /72   Pulse 90   Temp 97.8 °F (36.6 °C)   Wt 273 lb (123.8 kg)   SpO2 98%   BMI 51.58 kg/m²   Physical Exam  Constitutional:       General: She is not in acute distress. Appearance: Normal appearance. She is not ill-appearing. HENT:      Head: Normocephalic and atraumatic. Right Ear: External ear normal.      Left Ear: External ear normal.      Nose: Nose normal.      Mouth/Throat:      Mouth: Mucous membranes are moist.   Eyes:      Extraocular Movements: Extraocular movements intact. Conjunctiva/sclera: Conjunctivae normal.      Pupils: Pupils are equal, round, and reactive to light. Neck:      Musculoskeletal: Normal range of motion and neck supple. Vascular: No carotid bruit. Cardiovascular:      Rate and Rhythm: Normal rate and regular rhythm. Pulses: Normal pulses. Heart sounds: Normal heart sounds. Pulmonary:      Effort: Pulmonary effort is normal. No respiratory distress. Breath sounds: Normal breath sounds. Abdominal:      General: Bowel sounds are normal. There is no distension. Tenderness: There is no abdominal tenderness. Musculoskeletal: Normal range of motion. Right lower leg: Edema (4+) present. Left lower leg: Edema (4+) present. Lymphadenopathy:      Cervical: No cervical adenopathy. Skin:     General: Skin is warm and dry. Findings: Rash present. Comments: Skin is flaking and peeling with areas of seepage on legs. Neurological:      General: No focal deficit present. Mental Status: She is alert and oriented to person, place, and time.    Psychiatric: Mood and Affect: Mood normal.         Behavior: Behavior normal.         Thought Content: Thought content normal.         Assessment:       Diagnosis Orders   1. Yeast dermatitis  nystatin (MYCOSTATIN) 051427 UNIT/GM powder   2. Lymphedema  [facility] [place]    Culture, Aerobic and Anaerobic        Plan:       Nystatin for yeast dermatitis  Patient referred to lymphedema clinic  Wound seepage was cultured. Pictures taken of legs in order to determine treatment effectiveness  Educated to continue to keep legs elevated and continue with compression dressings. Patient will continue Lasix 60 mg daily. Patient educated get blood work done that was ordered by nephrology. Follow-up with Dr. Glenis Goodwin in 1 week for recheck leg wound     Return in about 1 week (around 1/26/2021) for with Dr Ken Roberts. Orders Placed This Encounter   Procedures    Culture, Aerobic and Anaerobic     Standing Status:   Future     Standing Expiration Date:   1/19/2022   [address]     Referral Priority:   Routine     Referral Type:   Eval and Treat     Referral Reason:   Specialty Services Required     Requested Specialty:   Wound Care     Number of Visits Requested:   1     Orders Placed This Encounter   Medications    nystatin (MYCOSTATIN) 277274 UNIT/GM powder     Sig: Apply 3 times daily. Dispense:  60 g     Refill:  1       Patient given educationalmaterials - see patient instructions. Discussed use, benefit, and side effectsof prescribed medications. All patient questions answered. Pt voiced understanding. Reviewed health maintenance. Instructed to continue current medications, diet andexercise. Patient agreed with treatment plan. Follow up as directed.      Electronicallysigned by SANDIE Olivas on 1/19/2021 at 4:19 PM

## 2021-01-21 NOTE — TELEPHONE ENCOUNTER
[address] of [facility] calling and states that she needs to have a demographics, dx's and medication list faxed to her at [phone].   Faxed/scanned

## 2021-01-25 NOTE — TELEPHONE ENCOUNTER
Patient's caregiver called in stating she attempted to get patient in her car to bring her to her 11:45 appointment with Dr. Sean Dangelo and was unable to. I attempted to reschedule patient but Dr. Michelle Kendall next available was not until March and patient did not want to schedule with another provider. She stated she will talk to her daughter and have her call back. Thank you.

## 2021-01-25 NOTE — TELEPHONE ENCOUNTER
Pt's daughter in law calling. Wanted you to be aware before her appt today that pt had a fall last week. Having an issue with her ankle, not sure which one. Also, scrated her arm and could not get it to stop bleeding. Asking if you can look at her skin folds under breast and abdomen for rashes? Also feet get to swelling so bad that she can't put on shoes. Asking if she can get a rx for boots, or what do you suggest? Aide is coming with pt today. Pt seeing Endo tomorrow. Now on 40 Units of her Albaro Irwin.

## 2021-01-26 NOTE — TELEPHONE ENCOUNTER
Pt verbalized the ok to talk to her care giver. Care giver states that she is not sure but her daughter would be there later and she knows her medications and she will have her call us back when she arrives.    See other rx request as well

## 2021-01-26 NOTE — TELEPHONE ENCOUNTER
Caregiver states she is not taking potassium or lasix. Advised her to contact pharmacy and take off auto renewal so that we do not get refill requests anymore.

## 2021-01-27 NOTE — TELEPHONE ENCOUNTER
Lasix 40 mg bid, potassium 20 meq bid is what they have on file which was last updated 12/29/20    Per [facility] Angel with [facility]

## 2021-01-27 NOTE — PROGRESS NOTES
[facility] PRIMARY CARE  [address] [facility] [address]  [address]: [address] is a 79 y.o. female who presents today for her medical conditions/complaintsas noted below. Chief Complaint   Patient presents with    Wound Check     1 week follow up       HPI:     HPI: The patient has cut back on her fluids. She has comfort givers 24/7 if available. [facility] comes and distributes medications and giver her medications. She was supposed to be on lasix and potasium on 22nd they said potassium and lasix was not in pill bottle. Right leg with seeping wound left leg worse right leg is the same. She is getting nystatin powder on folds three times daily rash is improving. Her rash on arms is better. She has been using the powder for about a month. Earlier this week she had blood drawn 2-3 days ago. The patients daughter gets     Her cut on arm is improving. She was supposed to go to lymphedema clinic at [facility].  Going to [facility] on 1-26 has a urine sample here for [facility] who wanted a urine micral.         LDL Cholesterol (mg/dL)   Date Value   06/24/2016 72   12/01/2015 68     LDL Calculated (mg/dL)   Date Value   03/11/2019 88   07/06/2017 76       (goal LDL is <100)   AST (U/L)   Date Value   01/07/2021 22     ALT (U/L)   Date Value   01/07/2021 18     BUN (mg/dL)   Date Value   01/07/2021 22     BP Readings from Last 3 Encounters:   01/27/21 130/80   01/19/21 128/72   01/08/21 130/76          (goal 120/80)    Past Medical History:   Diagnosis Date    Acute upper respiratory infection     Arthritis 6/23/2017    Backache     Bell's palsy     Benign hypertension with CKD (chronic kidney disease) stage III 7/15/2020    Cellulitis     Cellulitis 8/11/2020    Cellulitis of left lower extremity 7/26/2019    Chronic diastolic heart failure (HCC)     Chronic kidney disease, stage III (moderate)  CKD (chronic kidney disease) stage 3, GFR 30-59 ml/min 6/15/2020    Coronary artery disease     Cough     Diabetes ([address]     Diabetes mellitus ([address]     Diarrhea 12/8/2015    H/O acute bronchitis with bronchospasm     H/O acute sinusitis     H/O allergic reaction     H/O allergic rhinitis     H/O tinea cruris     Hx of heart artery stent     heart stents x 4    Hyperlipidemia     Hypertension     Joint pain, hip     LVH (left ventricular hypertrophy)     MI (myocardial infarction) ([address]     Morbid obesity ([address]     Otitis externa     Pulmonary hypertension ([address]     Right bundle branch block     Seizure disorder ([address]     Late effects of CVA    Subarachnoid bleed ([address]     Subarachnoid bleed ([address] march 28, 2016    Tendonitis, bicipital       Past Surgical History:   Procedure Laterality Date    CARDIAC CATHETERIZATION      heart stents x 4    CATARACT REMOVAL      Multiple surgeries    CATARACT REMOVAL WITH IMPLANT [facility] 11/01/2018    [facility]    CATARACT REMOVAL WITH IMPLANT Right 03/05/2019    [facility]    INTRACAPSULAR CATARACT EXTRACTION Right 3/5/2019    EYE CATARACT EMULSIFICATION IOL IMPLANT performed by Zach Alvarado MD at [address] [place] [address] Left     PACEMAKER PLACEMENT      FL XCAPSL CTRC RMVL INSJ IO LENS PROSTH W/O ECP Left 11/1/2018    EYE CATARACT EMULSIFICATION IOL IMPLANT performed by Zach Alvarado MD at [place] [address]  12/9/15       Family History   Problem Relation Age of Onset    Diabetes Father     Hypertension Father     Cancer Father         colon cancer    Diabetes Mother     Heart Disease Mother     Diabetes Brother     Heart Disease Brother     Colon Cancer Father        Social History     Tobacco Use    Smoking status: Never Smoker    Smokeless tobacco: Never Used   Substance Use Topics    Alcohol use: No     Alcohol/week: 0.0 standard drinks  calcium carbonate (OYSTER SHELL CALCIUM 500 MG) 1250 (500 Ca) MG tablet TAKE 1 TABLET BY MOUTH ONE TIME A DAY  30 tablet 1    triamcinolone (KENALOG) 0.025 % ointment Apply 1 each topically 2 times daily as needed  1    Glucose Blood (BLOOD GLUCOSE TEST STRIPS) STRP 1 strip by Does not apply route 4 times daily Indications: Mariam strips 200 strip 3    furosemide (LASIX) 40 MG tablet Take 40 mg by mouth daily Prescribed by Dr. Keely Hassan (APPLE CIDER VINEGAR) TABS Take by mouth      vitamin D (ERGOCALCIFEROL) 72093 UNITS CAPS capsule TAKE 1 CAPSULE BY MOUTH ONE TIME A WEEK  4 capsule 2    clopidogrel (PLAVIX) 75 MG tablet Take 1 tablet by mouth daily 90 tablet 3    metoprolol tartrate (LOPRESSOR) 25 MG tablet Take 0.5 tablets by mouth 2 times daily Indications: per Cardiologist's order 90 tablet 3    loperamide (IMODIUM) 2 MG capsule Take 1 capsule by mouth as needed for Diarrhea 30 capsule 1    Insulin Syringe-Needle U-100 (INSULIN SYRINGE .5CC/31GX5/16\") 31G X 5/16\" 0.5 ML MISC 1 each by Does not apply route daily 100 each 3    glipiZIDE (GLUCOTROL) 10 MG tablet Take 1 tablet by mouth every morning (before breakfast) 90 tablet 3    aspirin 81 MG chewable tablet Take 1 tablet by mouth daily (Patient taking differently: Take 81 mg by mouth 2 times daily ) 30 tablet 3     No current facility-administered medications for this visit.       Allergies   Allergen Reactions    Lantus [Insulin Glargine]      Hives,itching    Metformin And Related Diarrhea    Prednisone Swelling     Facial swelling    Sertraline Other (See Comments)     hallucination    Amoxicillin Diarrhea and Nausea And Vomiting    Bactrim [Sulfamethoxazole-Trimethoprim] Rash     Rash on arms    Eggs Or Egg-Derived Products Rash    Novolog [Insulin Aspart] Itching and Rash    Zinc Itching and Rash       Health Maintenance   Topic Date Due    COVID-19 Vaccine (1 of 2) 03/02/1956  Shingles Vaccine (1 of 2) 03/02/1989    DEXA (modify frequency per FRAX score)  03/02/1994    DTaP/Tdap/Td vaccine (2 - Td) 09/14/2010    Lipid screen  03/11/2020    Annual Wellness Visit (AWV)  12/02/2020    Flu vaccine (1) 01/05/2022 (Originally 9/1/2020)    Potassium monitoring  01/07/2022    Creatinine monitoring  01/07/2022    Pneumococcal 65+ years Vaccine  Completed    Hepatitis A vaccine  Aged Out    Hib vaccine  Aged Out    Meningococcal (ACWY) vaccine  Aged Out       Subjective:      Review of Systems   Constitutional: Negative for chills and fever. HENT: Negative for congestion and sinus pressure. Cardiovascular: Positive for palpitations (has pace maker) and leg swelling. Negative for chest pain. Gastrointestinal: Negative for diarrhea, nausea and vomiting. Musculoskeletal: Positive for gait problem (wheelchair). Skin: Positive for rash (very bad yeast dermatitis with visible yeast on all skin folds. ). Neurological: Positive for seizures (controlled on keppra). Negative for headaches. Psychiatric/Behavioral: Positive for confusion (at times poor memory ). Objective:     /80   Pulse 88   Temp 96.4 °F (35.8 °C)   SpO2 100%   Physical Exam  Constitutional:       General: She is not in acute distress. Appearance: Normal appearance. She is obese. She is not ill-appearing. HENT:      Head: Normocephalic and atraumatic. Right Ear: External ear normal.      Left Ear: External ear normal.      Nose: Nose normal.      Mouth/Throat:      Mouth: Mucous membranes are moist.   Eyes:      Extraocular Movements: Extraocular movements intact. Conjunctiva/sclera: Conjunctivae normal.      Pupils: Pupils are equal, round, and reactive to light. Neck:      Musculoskeletal: Normal range of motion and neck supple. Vascular: No carotid bruit. Cardiovascular:      Rate and Rhythm: Normal rate and regular rhythm. Pulses: Normal pulses. Heart sounds: Normal heart sounds. Pulmonary:      Effort: Pulmonary effort is normal. No respiratory distress. Breath sounds: Normal breath sounds. Abdominal:      General: Bowel sounds are normal. There is no distension. Tenderness: There is no abdominal tenderness. Musculoskeletal: Normal range of motion. Comments: Patient in wheelchair due to bilateral lower extremity edema. Lymphadenopathy:      Cervical: No cervical adenopathy. Skin:     General: Skin is warm and dry. Findings: Erythema and rash present. Comments: All skin folds with beefy red moist rash that is pruritic. Visible yeast buds and yeast smell. Neurological:      Mental Status: She is alert and oriented to person, place, and time. Gait: Gait abnormal (wheelchair). Psychiatric:         Mood and Affect: Mood normal.         Behavior: Behavior normal.         Thought Content: Thought content normal.         Assessment:       Diagnosis Orders   1. Lower extremity edema     2. Chronic diastolic heart failure ([address]     3. Secondary diabetes mellitus with stage 3 chronic kidney disease ([facility]     4. Uncontrolled type 2 diabetes mellitus with hyperglycemia (HCC)  POCT Glucose    POCT microalbumin   5. Heart block AV third degree ([address]     6. Morbid obesity with BMI of 45.0-49.9, adult ([address]     7. Nonintractable epilepsy without status epilepticus, unspecified epilepsy type ([address]     8. Deep vein thrombosis (DVT) of distal vein of left lower extremity, unspecified chronicity (HCC)     9. Yeast dermatitis  fluconazole (DIFLUCAN) 150 MG tablet        Plan:       1.  Lower extremity edema -----Patient continues to have worsening edema with open wound and fluid seeping. Continue wraps with dry gauze change as gauze becomes saturated. Educated on the portance of this in order to avoid maceration of tissue around the open wound. Nursing staff was educated on proper wrapping technique in order to avoid edema around the toes. -----Make appointment with lymphedema clinic [facility].  -----Use lotion on heel wound.  -----Pictures were taken on bilateral leg wounds as well as heel wound.  -----Follow-up for recheck in 2 weeks with Dr. Ana Quiñonez. 1. Chronic diastolic heart failure ([facility]  Patient continues to have edema on bilateral lower extremities despite 40 mg Lasix twice daily and spironolactone 25 mg. This is complicated by patient having only one kidney. Continue to follow with cardiology and continue current medication regimen. 2. Secondary diabetes mellitus with stage 3 chronic kidney disease ([facility]  Patient follows Dr. Ann Marie Ray for chronic kidney disease and single kidney. Continue to monitor patient's weights daily and notified nephrology if increase in weight by over 3 pounds in 1 week    3. Uncontrolled type 2 diabetes mellitus with hyperglycemia (HCC)  Patient's glucose was taken in office today was 58 patient was feeling tired. Patient was given 15 g carbohydrate fluid. Will get blood work from endo in order to monitor kidney function. Urine micral ordered will be  send to endocrinology. - POCT Glucose    4. Heart block AV third degree [facility])  Patient has heart block no change in symptoms. Patient has had pacemaker placed recently. Continue to follow cardiology. Continue current medication regimen. 5. Morbid obesity with BMI of 45.0-49.9, adult [facility])  Patient is working on diet and limiting fluids. Patient has exercise intolerance and is confined to wheelchair. Patient educated to continue to work on diet. Notify nephrology if increase in weight by over 3 pounds in 1 week. 6. Nonintractable epilepsy without status epilepticus, unspecified epilepsy type [facility])  Patient has not had a seizure in years continue Keppra 1000 mg twice daily. 7. Deep vein thrombosis (DVT) of distal vein of left lower extremity, unspecified chronicity ([facility]  Patient does not have any concerns taking Plavix and has filter placed. 8. Yeast dermatitis  Patient's yeast dermatitis is worsening despite 3 times daily nystatin powder to the area. There is a visible yeast and folds educated on proper hygiene including cleaning the area and letting dry completely including air drying with blow dryer on cool setting. Diflucan ordered for the next 2 weeks. Recheck rash in 2 weeks. - fluconazole (DIFLUCAN) 150 MG tablet; Take 1 tablet by mouth every other day for 14 days  Dispense: 7 tablet; Refill: 0  - Microalbumin, Ur    Return for 2 weeks with Dr. Malia Shabazz. . for recheck lower extremities. Orders Placed This Encounter   Procedures    POCT Glucose    POCT microalbumin     Orders Placed This Encounter   Medications    fluconazole (DIFLUCAN) 150 MG tablet     Sig: Take 1 tablet by mouth every other day for 14 days     Dispense:  7 tablet     Refill:  0       Patient given educationalmaterials - see patient instructions. Discussed use, benefit, and side effectsof prescribed medications. All patient questions answered. Pt voiced understanding. Reviewed health maintenance. Instructed to continue current medications, diet andexercise. Patient agreed with treatment plan. Follow up as directed.      Electronicallysigned by SANDIE Pierce on 1/27/2021 at 4:33 PM

## 2021-01-27 NOTE — TELEPHONE ENCOUNTER
Called and spoke with homecare nurse verbal gave from pt. Nurse was unsure of medications and was told to call Daughter Barber to get information. [address] and was told that [facility] takes care of her medications to call them at [address] [facility] [place] is the nurse and Braxton Schmitz is the LPN.

## 2021-01-28 NOTE — TELEPHONE ENCOUNTER
Pt was here yesterday and saw you. Was told to get Air compression boots. Pt's Daughter [place] calling asking where she is to get the boots and if pt was given a rx for them?  [phone]

## 2021-01-29 NOTE — TELEPHONE ENCOUNTER
Called and spoke to Lori's home health aide to advise her that Jone Mancini is scheduled with the lymphedema clinic on February 4th at 4:00pm at the [facility] was advised to go through entrance B once they arrive. Yanique Murillo stated she will call the patients daughter and let her know about this appt as well. Jone Mancini did answer the phone and asked me to speak to her health home aide regarding this appt.

## 2021-02-04 NOTE — CONSULTS
TREATMENT LOCATION:   [] [address]   [facility] [address] [phone]  F: [phone] [x] [address]: [phone]  F: [phone]      Lymphedema Services - Initial Evaluation for Lower Extremity    Date:  2021  Patient: Antonia Gabriel  : 1940             MRN: [id number]  Referring Physician: SANDIE Mariee/Aimee Cuellar (PCP)       Phone: [phone]  Fax: [phone]  Insurance: [address] [place] [address]  Medicare $30 co-pay, visits BMN  Medical Diagnosis: I89.0  Rehab Codes: I89.0   Onset Date: 21  Visit# / total visits: ; Progress note for Medicare patient due at visit 10   ( Certification Dates: 2021 -21 )  Allergies:  [] None       [] Latex       [] Adhesive tape       [x] Medications    [] Other  Medications: See charted information in Epic  Past Medical History: See charted information in Epic     Restrictions: None  Fall Risk:   [] No    [x] Yes   If yes, intervention:       PLAN FOR NEXT VISIT: caregiver training on bandaging    Overview: Patient is a 79year old female referred to the Lymphedema Clinic with a diagnosis of bilateral Lower Extremity Lymphedema. She presents with lymphorrhea and wounds. PHYSICIAN NOTES from referring provider 21 : Aliyah Bland is a pleasant 30-year-old female who presents today with a home health nurse for recheck of leg swelling and cellulitis. The patient was started on doxy and Lasix was increased patient was having pressure wrappings done by home nurse 3 times weekly. Patient was treated over the last week and return today for recheck. Patient's legs are still swollen and seeping fluid however redness has decreased. Patient still is not having any fevers. Patient states she has been compliant with her treatment. Of note patient only has 1 kidney and is being seen by Dr. Gomez Hernandez for this CKD stage 3. PHYSICIAN NOTES from referring provider 21:   The patient has cut back on her fluids. She has comfort givers 24/7 if available. [facility] comes and distributes medications and giver her medications. She was supposed to be on lasix and potasium on 22nd they said potassium and lasix was not in pill bottle. Right leg with seeping wound left leg worse right leg is the same. She is getting nystatin powder on folds three times daily rash is improving. Her rash on arms is better. She has been using the powder for about a month. Earlier this week she had blood drawn 2-3 days ago. Her cut on arm is improving. She was supposed to go to lymphedema clinic at [facility]. Going to [facility] on 1-26 has a urine sample here for [facility] who wanted a urine micral.       1. Lower extremity edema  -----Patient continues to have worsening edema with open wound and fluid seeping. Continue wraps with dry gauze change as gauze becomes saturated. Educated on the portance of this in order to avoid maceration of tissue around the open wound. Nursing staff was educated on proper wrapping technique in order to avoid edema around the toes. -----Make appointment with lymphedema clinic [facility].  -----Use lotion on heel wound.  -----Pictures were taken on bilateral leg wounds as well as heel wound.  -----Follow-up for recheck in 2 weeks with Dr. Reymundo Graves.     1. Chronic diastolic heart failure ([facility] [address]  Patient continues to have edema on bilateral lower extremities despite 40 mg Lasix twice daily and spironolactone 25 mg. This is complicated by patient having only one kidney. Continue to follow with cardiology and continue current medication regimen.     2. Secondary diabetes mellitus with stage 3 chronic kidney disease [facility])  Patient follows Dr. Kaleb Antoine for chronic kidney disease and single kidney. Continue to monitor patient's weights daily and notified nephrology if increase in weight by over 3 pounds in 1 week     3.  Uncontrolled type 2 diabetes mellitus with hyperglycemia ([email or web address]  Patient's glucose was taken in office today was 58 patient was feeling tired. Patient was given 15 g carbohydrate fluid. Will get blood work from endo in order to monitor kidney function. Urine micral ordered will be  send to endocrinology.     - POCT Glucose     4. Heart block AV third degree ([facility] [address]  Patient has heart block no change in symptoms. Patient has had pacemaker placed recently. Continue to follow cardiology. Continue current medication regimen.     5. Morbid obesity with BMI of 45.0-49.9, adult [facility])  Patient is working on diet and limiting fluids. Patient has exercise intolerance and is confined to wheelchair. Patient educated to continue to work on diet. Notify nephrology if increase in weight by over 3 pounds in 1 week.     6. Nonintractable epilepsy without status epilepticus, unspecified epilepsy type ([email or web address].)  Patient has not had a seizure in years continue Keppra 1000 mg twice daily.     7. Deep vein thrombosis (DVT) of distal vein of left lower extremity, unspecified chronicity ([email or web address].)  Patient does not have any concerns taking Plavix and has filter placed.     8. Yeast dermatitis  Patient's yeast dermatitis is worsening despite 3 times daily nystatin powder to the area. There is a visible yeast and folds educated on proper hygiene including cleaning the area and letting dry completely including air drying with blow dryer on cool setting. Diflucan ordered for the next 2 weeks. Recheck rash in 2 weeks. - fluconazole (DIFLUCAN) 150 MG tablet; Take 1 tablet by mouth every other day for 14 days  Dispense: 7 tablet; Refill: 0  - Microalbumin, Ur        SUBJECTIVE:  Pt reports she has had swelling for at least 1 year with increase in severity over past few months. Pt reports that drainage began in August/September. Prior to fall which lead to pacemaker insertion with SNF stay to follow in December, pt's daughter in law reports that pt had been seeing Rafglen Trinh.  Since discharge from SNF, they have only been following with PCP to reduce the amount of trips outside the home that pt needs to take. Home health RN 3x weekly to change wound dressings and wrap legs. Hx of Complete Decongestive Therapy:[]Yes - Date:        [x]No   Rate of onset:   [x] Slow   [] Rapid     [] Acute   Progression: [] Improving   [x]  Worsening  [] Consistent   Conservative Treatments Utilized in the Past:  [] None     [x] Compression Garments      [] Bandaging [x] Elevation  [] Exercise    []Self MLD       [x] Other/comments: lasix     Current Lymphedmea Treatments:   [] None     [] Compression Garments    [] Bandaging [] Elevation  [] Exercise    []Self MLD       [] Other/comments:          Aggravating factors:  [] None   [x] Activity  [] Stress  [] Sleep Position [] Travel [] Hot Temperatures [] Diet   []  Other/comments:    Relieving factors:   [] None [x] Elevation  [] Activity  [] Compression  [] Rest  [] Cold Temperatures [] Diet   []  Other/comments:    Comments:        Pain:  [] YES    [x] NO   Location: n/a     Pain Rating: ( 0-10 scale) : 0/10  Comments: Pt reporting no pain, but she does have pain at times depending on the weather and activity levels.     History of Cancer: []Yes [x]No      Comorbidities:   [x] Obesity [] Dialysis  [] Other: heart attack 2015   [] Asthma/COPD [] Dementia [] Other:   [x] Stroke- 2016 [] Sleep apnea [] Other:   [x] Vascular disease [] Rheumatic disease [] Other:       Absolute Lymphedema Contraindications - treatement [] None       [x] CHF ( only if patient is un-medicated or edema is solely d/t cardiac failure)    [x] Advanced Renal Disease - Need Physician Clearance: Stage III CKD, 1 kidney  removed   [x] Cardiac Arrhythmia - NO MLD TO NECK - pt has pacemaker      Absolute Contraindications Regarding the Deep Abdomen: [] None       [x] Presence of clot prevention devices ( e.g. - Winslow Filter)  - Vena Cava Filter    Relative Lymphedema Contraindications [] None [x] Age 60+-No MLD to neck to avoid mobilizing arteriosclerotic plaque                Home/Work Environment  Patient lives with: Alone, but 24/7 care available. HH RN, PT, and OT throughout the week as well. Daughter in law very involved in care. In what type of home [x]  One story   [] Two story   [] Split level  [] Apartment   Number of stairs to enter Ramped entrance   With handrail on the []  Right to enter   [] Left to enter   Bathroom has a []  Tub only  [] Tub/shower combo   [x] Walk in shower - grab bars, shower chair   [x]  Grab bars   Washing machine is on []  Main level   [] Second level   [] Basement   Employer Retired   Job Status []  Normal duty   [] Light duty   [] Off due to condition    []  Retired   [] Not employed   [] Disability  [] Other:  []  Return to work:    Work activities/duties        ADL/IADL Previous level of function Current level of function Who currently assists the patient with task   Bathing  [x] Independent  [] Assist [] Independent  [x] Assist Aides assist mostly with transfer, but do assist with actual bathing PRN   Dress/grooming [x] Independent  [] Assist [] Independent  [x] Assist LB dressing    Transfer/mobility [x] Independent  [] Assist [] Independent  [x] Assist Has lift chair, assist with shower transfers, getting in/out of car   Feeding [x] Independent  [] Assist [x] Independent  [] Assist    Toileting [x] Independent  [] Assist [] Independent  [x] Assist Aides assist   Driving [x] Independent  [] Assist [x] Independent  [] Assist Daughter in law, home health aides   Housekeeping [x] Independent  [] Assist [] Independent  [x] Assist Home health aides   Grocery shop/meal prep [x] Independent  [] Assist [] Independent  [x] Assist Son     Gait Prior level of function Current level of function    [x] Independent  [] Assist [] Independent  [x] Assist   Device: [] Independent [] Independent    [] Straight Cane [] Quad cane [] Straight Cane [] Quad cane    [] Standard walker [] Rolling walker   [] 4 wheeled walker [] Standard walker [] Rolling walker   [x] 4 wheeled walker - at home    [] Wheelchair [x] Wheelchair - in community     Daughter in [facility], and/or home health aide assist with and attend appointments. OBJECTIVE    Physical Status:   Range of motion: Deficits [x] Yes [] No       Comment: BLE's  Strength:       Deficits [x] Yes [] No        Comment: BLE's    Edema Presentation  Location: bilateral Lower Extremity    Description: Firm  Lorena hump on dorsum of foot     Stemmer Sign: [x] Positive  [] Negative   Pitting:  bilateral Lower Extremity 2+   Additional comments:        Skin of Affected Extremity(ies)  Integrity Dry/Scaly  Hyperkeratosis  Lymphorrhea/weeping LLE   Palpation/Texture Fibrotic   Color/Appearance Redness   Scars No   Wounds 5x4 cm wound present on LLE that is dressed upon arrival with gauze. Closed scabs on R shin. Sensation Location: legs, Numbness, Tingling            Circumferential Measurements Lower Extremity: Measurements taken from nail base of D2 digit. Measurements in Centimeters Right Left   MTS          Dorsum   10 26.2 26.8   Inframalleolar 15      34.5 35.5   Supramalleolar  18   29.8 32.5   Lower Calf23 34.7 35.6   Mid Calf 30 43.3 44.8   Upper Calf 40 52.0 54.2   Knee 46 49.0 50.7   10 cm above knee     Thigh-widest     Hip-widest     Current Total:     Previous Total:      Initial Total 2/4/21:           269.5         280.1              ASSESSMENT: Patient would benefit from skilled occupational therapy services in order to: Decrease edema that has accumulated in the extremity, improve limb ROM, and improve overall quality of life.      Pt and daughter are educated extensively on the purpose of lymphedema treatments and a POC that would be of benefit which includes compression bandaging, MLD, exercises, and education with transition to long term management devices such as velcro compression garments and/or daytime compression stockings. They are also educated on an impaired lymphatic system how it relates to swelling and skin integrity. Due to inability to come to clinic multiple times per week, treatment will be 1x/weekly with heavy focus on caregiver training for proper bandaging technique with short stretch for safest reduction of swelling. They are agreeable provided with a folder containing information for home review. Pt's current dressings are removed for measurements and assessment. Pt's wounds are re-dressed with non-woven gauze, ABD padding, gauze roll as current dressings are saturated. Prior to re-dressing, skin care completed. ACE bandages are re-applied lightly over top. Pt and daughter in law are educated on ACE wraps vs. Short stretch compression, with short stretch bandages being preferred method for reduction of edema. Lymphedema Stage per ISL Guidelines   [] 0: Subclinical with no evidence on physical exam  [] 1:  Early onset, swelling/heaviness, pitting edema, subsides with limb elevation  [] 2:  More advanced, fibrosis resulting in non-pitting edema, does not respond to elevation, thickening of the tissues  [x] 3: Elephantiasis, pitting absent, huge limbs with dry/scaly, papillomatosis, hyperkeratosis, fluid may be leaking with recurrent cellulitis. Acanthosis (deep body folds). Elevation &   diuretics ineffective. Response to Education Provided:  [x] Verbalized understanding   [] Demonstrates/verbalizes HEP/Ed previously given.       [] Needs continued review            [] No understanding   Learner(s): [x] Patient  [] Spouse [x] Family  [] Other:   Method(s): [x] Verbal [] Demonstration [x] Handouts [] Exercise booklet   Family available to assist with home program if needed: [x] Yes [] No      Compression Recommendations for Medical Management:   [x] Short stretch compression bandages  [x] Compression sleeve/stockings  [] Compression glove  [x] Velcro alternative bandaging device  [x] Vasopneumatic pump  [] Compression bra or shirt  [] Compression pants  [] Swell spot  [] Other:            Response to treatment: Pt and caregiver are motivated to participate in therapy. Patient's Goal: Reduce swelling in legs/feet in order to wear shoes and walk better. Therapy Goals for the Lower Extremity     1. Pt will demonstrate compliance of maintaining lymphedema precautions to reduce the risks of infection and further exacerbations by the 3rd visit. 2. Caregiver(s) will demonstrate proper technique with bandaging and understand the principles and theory behind bandaging technique in order to assist with decreasing swelling up to ( 5+ ) cm.     3. Pt will demonstrate independence with decongestive exercise program in order to expedite fluid rerouting. 4. Pt will demonstrate competence with simple lymphatic drainage massage in order to reroute lymphatic pathways for decreased swelling. 5. Pt/caregiver(s) will demonstrate independence donning the device with safe and effective use of pneumatic pump in order for possible home use. 6. Pt will demonstrate safe and effective use of compression garment to maintain decreased size upon discharge. 7. Pt will demonstrate safe and effective use of alternative bandaging/ velcro devices to simplify and reduce size upon discharge. Treatment Plan:   Pt will be seen 1 x/ week for 6 visits and will follow up as appropriate. Focus is to remain on short and long term goals listed above.    Treatment may include: [x]Bandaging  [x] Self Bandaging/Caregiver Training [x]MLD  [x]Self-MLD  [x] Therapeutic Exercise  [x] Education [x] Vasopneumatic Pump  [x]Obtain referral for garments  []Medical Hold  []Discharge to Home Program   Rehabilitation Potential/Commitment: [x] Good (with caregiver assist) [] Fair     [] Poor    Functional Assessment Used: Lymphedema Life Impact Scale (LLIS)  Functional Impact Score: [x] Eval 77%   [] 10th visit____  [] D/C

## 2021-02-05 NOTE — TELEPHONE ENCOUNTER
Should be on 40 mg lasix daily-sent in to [facility]   No need for potassium right now, but would like to check her labs in about a week or 2.  - order entered

## 2021-02-05 NOTE — TELEPHONE ENCOUNTER
Pt is supposed to be on 40 mg daily regularly. He had given her extra 20 mg to add to that for just a few days. Does she have her 40 that she takes regularly or is she out of that?

## 2021-02-05 NOTE — TELEPHONE ENCOUNTER
Pt's daughter notified with message below. Faxed lab order to [facility].  Robin Benson [facility] @ [phone]

## 2021-02-05 NOTE — TELEPHONE ENCOUNTER
Pt's daughter Barber calling. States that Wil Comer put her Mom on Lasix with no refills. Still needs this med. Also was put on Potassium Chloride with no refills. Barber is asking if she is to be on these long term, they need to go to [employer] and 1 month supply to [employer] on [address].

## 2021-02-05 NOTE — TELEPHONE ENCOUNTER
Spoke with Barber. Pt does need a refill of the Lasix 40mg. Said that Amanda from [facility] nurse,  was contacting [facility] to have them request the refills. Pt needs them to go to [facility] also.

## 2021-02-08 NOTE — FLOWSHEET NOTE
Lymphedema Clinic     Date:  2021  Patient: Petra Akbar  : 1940  MRN: [id number]  Referring Physician: SANDIE Christine       Phone: [phone] Fax: [phone]  Insurance: Maggie Morton Medicare $30 co-pay, visits BMN  Medical Diagnosis: I89.0  Rehab Codes: I89.0        Dear Dr. Vidal Griffiths patient by the name of, Petra Akbar, has been referred to the [address] [place] [address] for treatment of Lower Extremity lymphedema. During the initial intake, the patient mentioned having been seen by you for the following conditions: Stage III CKD    The lymphedema team utilizes Complete Decongestive Treatment ( CDT) as a standard of care which results in an increased amount of lymphatic fluid being mobilized through out the body. This therapy is contraindicated if the patient has any medical conditions in which the body would have difficulty moving excess fluid through the heart, lungs, and/or kidneys. ( ie: Congestive Heart Failure, Severe COPD, Kidney Failure, and/ Chronic Kidney Disease)      Please print this form, check the appropriate directive, sign below, and fax back to [phone] thereby giving consent to continue treatment. If you have any questions, please feel free to call me at [phone] and leave a message, I will return your call. Thank you for your time related to this issue.        ________ 1. Patient IS medically stable to continue with Lymphedema treatments. Lymphedema Treatment may include all of the following:      - Manual Lymphatic Drainage  - Compression bandaging      - Decongestive Exercises  - Compression garment training     - Trail of vasopneumatic pump - [employer]. ________2.  Patient IS NOT medically stable to continue with Lymphedema treatments.          _____________________________________________________   Date: ______________           Physician signature     Sincerely,  Electronically signed by George Barajas Andradha, OT on 2/8/2021 at 1:34 PM    Igor FLORES/L, CLT  Occupational Therapist  Certified Lymphedema Therapist   Office: [phone]  Fax:    [phone]

## 2021-02-09 NOTE — FLOWSHEET NOTE
Lymphedema Clinic     Date:  2021  Patient: Misael Delarosa  : 1940  MRN: [id number]  Referring Physician: SANDIE Antonio (PCP)      Phone: [phone] Fax: [phone]  Insurance: Julian Montes [facility] Medicare $30 co-pay, visits BMN  Medical Diagnosis: I89.0  Rehab Codes: I89.0        Dear Dr. Poly You patient by the name of, Misael Delarosa, has been referred to the [address] [place] [address] for treatment of Lower Extremity lymphedema. During the initial intake, the patient mentioned having been seen by you for the following conditions: chronic diastolic heart failure    The lymphedema team utilizes Complete Decongestive Treatment ( CDT) as a standard of care which results in an increased amount of lymphatic fluid being mobilized through out the body. This therapy is contraindicated if the patient has any medical conditions in which the body would have difficulty moving excess fluid through the heart, lungs, and/or kidneys. ( ie: Congestive Heart Failure, Severe COPD, Kidney Failure, and/ Chronic Kidney Disease)      Please print this form, check the appropriate directive, sign below, and fax back to [phone] thereby giving consent to continue treatment. If you have any questions, please feel free to call me at [phone] and leave a message, I will return your call. Thank you for your time related to this issue.        ________ 1. Patient IS medically stable to continue with Lymphedema treatments. Lymphedema Treatment may include all of the following:      - Manual Lymphatic Drainage  - Compression bandaging      - Decongestive Exercises  - Compression garment training     - Trail of vasopneumatic pump - [employer]. ________2.  Patient IS NOT medically stable to continue with Lymphedema treatments.          _____________________________________________________   Date: ______________           Physician signature Sincerely,  Electronically signed by Laurie Fishman OT on 2/9/2021 at 10:54 AM    Laurie Fishman OTR/L, [address]  Occupational Therapist  Certified Lymphedema Therapist   Office: [phone]  Fax:    [phone]

## 2021-02-09 NOTE — TELEPHONE ENCOUNTER
Pt for 3hr HD treatment today  Pt usually on MWF schedule as outpatient  Extra treatment today for additional fluid removal   Right upper arm fistula cannulated with #16g needles x2 without difficulty  BFR to 350 d/t #16g needles used for treatment  UF 2kg as tolerated  Using 2K+ bath for treatment        Problem: METABOLIC, FLUID AND ELECTROLYTES - ADULT  Goal: Electrolytes maintained within normal limits  Description: INTERVENTIONS:  - Monitor labs and assess patient for signs and symptoms of electrolyte imbalances  - Administer electrolyte replacement as ordered  - Monitor response to electrolyte replacements, including repeat lab results as appropriate  - Instruct patient on fluid and nutrition as appropriate  - Dialysis as ordered  Outcome: Progressing  Goal: Fluid balance maintained  Description: INTERVENTIONS:  - Monitor labs   - Monitor I/O and WT  - Instruct patient on fluid and nutrition as appropriate  - Assess for signs & symptoms of volume excess or deficit  -Dialysis as ordered  Outcome: Progressing VM left to speak with pt regarding Lasix. Her caregiver called this morning and asked pt if she was taking it and pt said she was not. 10 minutes later caregiver calls back and states pt IS taking it. VM left for pt to return the call to clarify.

## 2021-02-11 NOTE — PROGRESS NOTES
 LVH (left ventricular hypertrophy)     MI (myocardial infarction) ([address]     Morbid obesity (HCC)     Otitis externa     Pulmonary hypertension ([facility]     Right bundle branch block     Seizure disorder ([facility]     Late effects of CVA    Subarachnoid bleed (HCC)     Subarachnoid bleed ([facility] march 28, 2016    Tendonitis, bicipital       Past Surgical History:   Procedure Laterality Date    CARDIAC CATHETERIZATION      heart stents x 4    CATARACT REMOVAL      Multiple surgeries    CATARACT REMOVAL [facility] IMPLANT [facility] 11/01/2018    [facility]    CATARACT REMOVAL WITH IMPLANT Right 03/05/2019    [facility]    INTRACAPSULAR CATARACT EXTRACTION Right 3/5/2019    EYE CATARACT EMULSIFICATION IOL IMPLANT performed by Marisol Cochran MD at [address] [place] [address] Left     PACEMAKER PLACEMENT      [place] XCAPSL [place] [facility] INSJ IO LENS PROSTH W/O ECP Left 11/1/2018    EYE CATARACT EMULSIFICATION IOL IMPLANT performed by Marisol Cochran MD at [place] [address]  12/9/15       Family History   Problem Relation Age of Onset    Diabetes Father     Hypertension Father     Cancer Father         colon cancer    Diabetes Mother     Heart Disease Mother     Diabetes Brother     Heart Disease Brother     Colon Cancer Father        Social History     Tobacco Use    Smoking status: Never Smoker    Smokeless tobacco: Never Used   Substance Use Topics    Alcohol use: No     Alcohol/week: 0.0 standard drinks     Comment: one glass of wine per year      Current Outpatient Medications   Medication Sig Dispense Refill    potassium chloride (KLOR-CON) 20 MEQ packet Take 20 mEq by mouth 2 times daily      doxycycline hyclate (VIBRA-TABS) 100 MG tablet Take 100 mg by mouth 2 times daily      metoprolol succinate (TOPROL XL) 25 MG extended release tablet Take 1 tablet by mouth daily 30 tablet 5  levETIRAcetam (KEPPRA) 1000 MG tablet TAKE 1 TABLET TWICE A  tablet 3    furosemide (LASIX) 40 MG tablet Take 1 tablet by mouth daily 90 tablet 3    nystatin (MYCOSTATIN) 703287 UNIT/GM powder Apply 3 times daily. 60 g 1    Magnesium 400 MG TABS Take 400 mg/day by mouth daily 30 tablet 1    triamcinolone (ARISTOCORT) 0.5 % cream Apply topically 3 times daily. 15 g 1    aspirin 81 MG EC tablet Take 1 tablet by mouth daily 30 tablet 3    atorvastatin (LIPITOR) 10 MG tablet Take 1 tablet by mouth daily 30 tablet 3    clopidogrel (PLAVIX) 75 MG tablet Take 1 tablet by mouth daily 30 tablet 3    BD PEN NEEDLE KRISTEN U/F 32G X 4 MM MISC       simvastatin (ZOCOR) 20 MG tablet TAKE 1 TABLET NIGHTLY 90 tablet 3    APIDRA SOLOSTAR 100 UNIT/ML injection pen INJECT 15 UNITS SUBCUTANEOUSLY FOUR TIMES A DAY (BEFORE MEALS AND NIGHTLY) 15 mL 3    sodium chloride (ALTAMIST SPRAY) 0.65 % nasal spray 1 spray by Nasal route 2 times daily 1 Bottle 3    spironolactone (ALDACTONE) 25 MG tablet TAKE 1 TABLET DAILY 90 tablet 3    TRESIBA FLEXTOUCH 100 UNIT/ML SOPN Inject 70 Units into the skin nightly Patient is taking 60 units nightly 5 pen 0    Clobetasol Propionate 0.05 % LIQD Once daily for not more than 2 weeks at a time.  1 Bottle 0    acetaminophen (TYLENOL) 325 MG tablet Take 2 tablets by mouth every 6 hours as needed for Pain 40 tablet 0    Ginger, Zingiber officinalis, (GINGER PO) Take by mouth      Multiple Vitamins-Minerals (MULTIVITAMIN WOMEN PO) Take by mouth      albuterol sulfate HFA (PROVENTIL HFA) 108 (90 Base) MCG/ACT inhaler Inhale 2 puffs into the lungs every 6 hours as needed for Shortness of Breath (cough) 1 Inhaler 3    calcium carbonate (OYSTER SHELL CALCIUM 500 MG) 1250 (500 Ca) MG tablet TAKE 1 TABLET BY MOUTH ONE TIME A DAY  30 tablet 1    triamcinolone (KENALOG) 0.025 % ointment Apply 1 each topically 2 times daily as needed  1 Findings: No rash. Neurological:      Mental Status: She is alert and oriented to person, place, and time. Psychiatric:         Mood and Affect: Mood normal.         Behavior: Behavior normal.         Thought Content: Thought content normal.         Assessment:       Diagnosis Orders   1. Type 2 diabetes mellitus with other circulatory complication, with long-term current use of insulin ([facility]     2. Benign hypertension with CKD (chronic kidney disease) stage III     3. Chronic diastolic heart failure ([facility]     4. Edema of both legs     5. Venous insufficiency (chronic) (peripheral)     6. Wound of left lower extremity, subsequent encounter          Plan:      Return in about 4 weeks (around 3/11/2021) for wound check. No orders of the defined types were placed in this encounter. Orders Placed This Encounter   Medications    metoprolol succinate (TOPROL XL) 25 MG extended release tablet     Sig: Take 1 tablet by mouth daily     Dispense:  30 tablet     Refill:  5       Patient given educationalmaterials - see patient instructions. Discussed use, benefit, and side effectsof prescribed medications. All patient questions answered. Pt voiced understanding. Reviewed health maintenance. Instructed to continue current medications, diet andexercise. Patient agreed with treatment plan. Follow up as directed.      Electronicallysigned by Elmer Winston MD on 2/11/2021 at 11:21 AM

## 2021-02-11 NOTE — TELEPHONE ENCOUNTER
Let her know thanks for the update. The aid that was with her told me about the cardiologist and the lymphedema clinic but not the DM med changes.

## 2021-02-11 NOTE — TELEPHONE ENCOUNTER
Pts daughter Ariel Joseph calling and states that her mother has a apppointment today but is unable to come with her and wants to report a few things:   1. That her endocrinologist has:  -changed her Tresiba to 30 units QD  - changed her Apidra to 25 units with breakfast, 30 units with lunch and 20 unuits with dinner.  - DC'd her Glipizide     2. Her cardiologist has:  -continued her Metoprolol 25 mg QD   -continued 1 baby Asprin daily     -Told her that her EKG readings are good and her pacemaker is doing good  -f/u with cardiologist in 6 months. 3. She started going to the OT lymphedema  clinic 1x week with Anthony Gray. They will being education/treatment of compress wraps for the lymphedema on 02/25/21. Her daughter wants to to strongly educate the pt on the importance of eating and healthy diet, exercise and going to the OT lymphedema  Clinic.

## 2021-02-25 NOTE — FLOWSHEET NOTE
TREATMENT LOCATION:   [] [address]   [facility]: [phone]  F: [phone] [] [address]: [phone]  F: [phone]        Lymphedema Services - Treatment Note of the Lower Extremity    Date:  2021  Patient: Eric Castellanos  : 1940             MRN: [id number]  Referring Physician: SANDIE Aguirre       Phone: [phone]  Fax: [phone]  Insurance: [employer] Medicare $30 co-pay, visits BMN  Medical Diagnosis: I89.0  Rehab Codes: I89.0     Onset Date: 21  Visit# / total visits: ; Progress note for Medicare patient due at visit 10   ( Certification Dates: 2021 -21 )      Plan for next session:   Follow up with caregivers on bandaging, NLN, instruct basic self-MLD        Contraindication Assessment:   Absolute Lymphedema Contraindications - treatement                  [x]? CHF ( only if patient is un-medicated or edema is solely d/t cardiac failure)               [x]? Advanced Renal Disease - Need Physician Clearance: Stage III CKD, 1 kidney             removed              [x]? Cardiac Arrhythmia - NO MLD TO NECK - pt has pacemaker        Absolute Contraindications Regarding the Deep Abdomen:                  [x]? Presence of clot prevention devices ( e.g. - Saul Filter)  - Vena Cava Filter     Relative Lymphedema Contraindications                  [x]? Age 60+-No MLD to neck to avoid mobilizing arteriosclerotic plaque         Subjective: \"Am I going to be able to wear shoes with this? \"    Pain: [] YES    [x] NO     Location: n/a      Pain Rating: ( 0-10 scale) : 0/10  Comments:         Objective:   [x] Measurement [x] Bandaging [] MLD [x] Skin care   [x] Education [] Self-bandaging [] Self-MLD [x] Wound Care   [] Exercise [x] Caregiver training [] Kinesiotaping [] Other:    [] Nutrition [] Garment fitting/training [] Vasopneumatic Pump        Circumferential Measurements Circumferential Measurements Lower Extremity: Measurements taken from nail base of D2 digit. Measurements in Centimeters Right Left   MTS            Dorsum   10 26.2 26.8   Inframalleolar 15      34.5 35.5   Supramalleolar  18   29.8 32.5   Lower Calf23 34.7 35.6   Mid Calf 30 43.3 44.8   Upper Calf 40 52.0 54.2   Knee 46 49.0 50.7   10 cm above knee       Thigh-widest       Hip-widest       Current Total:          Initial Total 2/4/21:    N/T 2/25 due to time     269.5  N/T 2/25 due to time     280. 1                Assessment :  [x] Progressing toward goals. Pt arrives with daughter in law Barber and with home health aide for caregiver training. No updated measurements this date due to time. Pt with new open blister to R shin. Pt recently treated for possible infection to open wound on LLE. Pt has been on antibiotics x1 week and no signs/symptoms of infection today. Pt arrives with wounds dressed with non-stick padding and Kerlix to keep in place. Pt, daughter in law, and home health aide are extensively educated on bandaging precautions & home protocol (see \"Bandaging Instructions\" below) as pt has hx of CHF & CKD. Fortunately, pt has 24/7 care and she can be closely monitored and is able to report any changes that occur. As caregivers observe, writer bandages and provides skin care in the following fashion:  Skin care provided via washing, dressing wounds, and applying Eucerine lotion to the LLE to include the foot and leg. Stockinette is applied over top of the legs as a protective barrier from the lotion. Rosidal foam is placed on the legs from the lakes of the feet to just below the knees. Comprilan short stretch bandage is then placed from the lakes of the foot to the base of the knees, with approx 50% pull. Herringbone technique is used to progress up the legs and assist with venous return, and lymphatic flow production/ stimulating a suctioning effect.      Home health aide is then trained on bandaging technique on the RLE using the technique outlined above. Bandaging Instructions (copies sent home with patient/caregivers)  Continue with wound care as usual. Bandages can be removed for this, but re-apply as able. Leave bandages on 24/7 other than wound care or bathing needs. Monitor urine output - make sure you are continuing to at least urinate as normal. We do not want this fluid to be pushed elsewhere to sit. If bandages begin to sag, they are not doing their job anymore and need to be re-applied for best benefit. Bring back all bandages for each visit with Anthony Gray! Reasons to remove bandages: They become soaked/soiled - wash them in the washer or hand wash   You are having pain in your legs that is worse than normal   Shortness of breath, chest heaviness, lightheadedness, feeling ill in general - if these symptoms do not resolve after removal seek medical attention immediately!!       Post-treatment symptom assessment for swelling, heaviness, tightness to the affected limb, chest or truncal area:                             [] No change   [] Improving   [x] Patient would continue to benefit from skilled occupational therapy services in order to address the following goals                Therapy Goals for the Lower Extremity      1. Pt will demonstrate compliance of maintaining lymphedema precautions to reduce the risks of infection and further exacerbations by the 3rd visit.     2. Caregiver(s) will demonstrate proper technique with bandaging and understand the principles and theory behind bandaging technique in order to assist with decreasing swelling up to ( 5+ ) cm.      3. Pt will demonstrate independence with decongestive exercise program in order to expedite fluid rerouting.     4.  Pt will demonstrate competence with simple lymphatic drainage massage in order to reroute lymphatic pathways for decreased swelling.     5. Pt/caregiver(s) will demonstrate independence donning the device with safe and effective use of pneumatic pump in order for possible home use.     6. Pt will demonstrate safe and effective use of compression garment to maintain decreased size upon discharge.     7. Pt will demonstrate safe and effective use of alternative bandaging/ velcro devices to simplify and reduce size upon discharge. Response to Education Provided:  [x] Verbalized understanding   [x] Demonstrates/verbalizes HEP/Ed previously given. [] Needs continued review            [] No understanding   Learner(s): [x] Patient  [] Spouse [x] Family [x] Other:  [address]  Method(s): [x] Verbal [x] Demo [x] Handouts     Knowledge of home program:  [] Good [x] Fair [] Poor [x] With assist from family/caregiver      Plan:   [x] Continue current frequency toward long and short term goals.     [] Specific Instructions for subsequent treatments:      Charges   Minutes   Units   Evaluation (20261)            Low            Moderate            High     Manual Therapy (85272) 70 5   Therapeutic activities (50712)     Therapeutic Exercise (18967)     Self care/home mgmt (17777) 20 1   Vasopneumatic Pump (57336)     Total Treatment Time    90 6       Medicare Tracking Totals   Today 154.07   Previous  134.24   Grand Total 288.31       Time In:  9:00  Time Out: 10:30      Electronically signed by Jose Guadalupe Khanna OT on 2/25/2021 at 10:22 AM

## 2021-03-03 NOTE — TELEPHONE ENCOUNTER
Sherryle Davis- Pts home health aide is calling and states the patients daughter wanted her to call because Jewel Valencia finished her antibiotic yesterday and is starting to get a new wound on her left lower leg-shin area. Pt is only going to the lymphedema clinic right now for her edema and wounds. Pt has a appt on 3/12/2021. Does the patient needs to come in before then or start another antibiotic?     Please advise

## 2021-03-04 NOTE — TELEPHONE ENCOUNTER
Verbal consent was given by Ariana Mejias to allow Tangela Childress to use her email to create a [employer] account. Link was sent and Tangela Childress states she will take a picture tomorrow when she does a dressing change. She states it does not appear infected and pt does not have a fever.

## 2021-03-04 NOTE — TELEPHONE ENCOUNTER
Kendra Raymond MA       3/3/21 11:43 AM  Note     Gillian Dorsey- Pts home health aide is calling and states the patients daughter wanted her to call because Ivette Hi finished her antibiotic yesterday and is starting to get a new wound on her left lower leg-shin area.      Pt is only going to the lymphedema clinic right now for her edema and wounds.      Pt has a appt on 3/12/2021. Does the patient needs to come in before then or start another antibiotic?     Please advise         Gillian Dorsey called back regarding this, please advise. Original encounter was closed so I was not sure that you would see it.

## 2021-03-04 NOTE — FLOWSHEET NOTE
Measurements taken from nail base of D2 digit. Measurements in Centimeters Right Left   MTS            Dorsum   10 26.2 26.8   Inframalleolar 15      34.5 35.5   Supramalleolar  18   29.8 32.5   Lower Calf23 34.7 35.6   Mid Calf 30 43.3 44.8   Upper Calf 40 52.0 54.2   Knee 46 49.0 50.7   10 cm above knee       Thigh-widest       Hip-widest       Current Total:          Initial Total 2/4/21:   NT       269.5 NT      280.1                Assessment :  [x] Progressing toward goals. Pt arrives with daughter in law [facility] and with home health aide Ameena Delcid. No updated measurements this date due to time spent with bandaging and wound care. Pt with new open blisters to L shin and new closed blisters to bilateral shins and just below L little toe. Bandages are in place from yesterday's application and ABD padding has been applied over open wounds. Upon removal, pt has visibly reduced, but skin integrity remains a concern. ABD padding requires saline solution to remove as it is sticking to wound. Discussed utilizing non-stick gauze which may be less aggressive to wound. BLE's are cleaned and wounds are treated with antibiotic ointment. Gray foam donuts are cut and placed around closed blisters in an effort to protect. Wounds are dressed with non-stick padding and secured with gauze roll. ABD padding utilized overtop of non-stick padding for additional padding to reduce pressure over shins. Toe bandaging added today. Pt and caregivers are educated that if skin integrity continues to be a concern that compression strategy will transition to velcro alternative bandaging device which can be removed more frequently for wound care and skin integrity monitoring and may be more gentle than bandages. Writer utilizes       As caregivers observe, writer bandages and provides skin care in the following fashion.  Below is in addition to what was provided above:  Skin care provided via washing, dressing wounds, and applying Eucerine lotion to the BLE to include the foot and leg. Stockinette is applied over top of the legs as a protective barrier from the lotion. Rosidal foam is placed on the legs from the lakes of the feet to just below the knees. Comprilan short stretch bandage is then placed from the lakes of the foot to the base of the knees, with approx 50% pull. Herringbone technique is used to progress up the legs and assist with venous return, and lymphatic flow production/ stimulating a suctioning effect. Pt and caregivers are educated on basic MLD techniques including deep breathing and node clearing. Writer provides visual and tactile cues on pt and caregivers to aid in instruction. Pt is encouraged to complete minimum 2x a day, but is able to complete more often if able. Pt is agreeable and verbalizes understanding. Pt is provided with handout to assist with completion at home. It is recommended to complete in supine/reclined position to allow easier access to inguinal lymph nodes. Addendum 3/8: Writer follows up with daughter in law [facility]. She states that skin integrity did not improve and they discontinued bandages on Friday, which would have been writer's recommendation. Additionally, MD had concern for infection and pt is on antibiotics. Writer discussed transition to more gentle compression option (as indicated above) may be best course of action to manage edema more conservatively due to skin fragility. Addendum 3/10: Daughter in law Barber follows up with writer following pt appointment with PCP who referred to wound care. Writer informs [facility] that we will cancel remaining visits with lymphedema clinic in order to focus on wound healing with wound care treatment at [facility] as wounds are only worsening with compression therapy provided in lymphedema clinic. Once appropriate (wounds healed/managed), pt may return to lymphedema clinic to return focus to reduction of/management of lymphedema. Post-treatment symptom assessment for swelling, heaviness, tightness to the affected limb, chest or truncal area:                             [] No change   [] Improving   [x] Patient would continue to benefit from skilled occupational therapy services in order to address the following goals                Therapy Goals for the Lower Extremity      1. Pt will demonstrate compliance of maintaining lymphedema precautions to reduce the risks of infection and further exacerbations by the 3rd visit.     2. Caregiver(s) will demonstrate proper technique with bandaging and understand the principles and theory behind bandaging technique in order to assist with decreasing swelling up to ( 5+ ) cm.      3. Pt will demonstrate independence with decongestive exercise program in order to expedite fluid rerouting.     4. Pt will demonstrate competence with simple lymphatic drainage massage in order to reroute lymphatic pathways for decreased swelling.     5. Pt/caregiver(s) will demonstrate independence donning the device with safe and effective use of pneumatic pump in order for possible home use.     6. Pt will demonstrate safe and effective use of compression garment to maintain decreased size upon discharge.     7. Pt will demonstrate safe and effective use of alternative bandaging/ velcro devices to simplify and reduce size upon discharge. Response to Education Provided:  [x] Verbalized understanding   [x] Demonstrates/verbalizes HEP/Ed previously given. [x] Needs continued review            [] No understanding   Learner(s): [x] Patient  [] Spouse [x] Family [x] Other:  [address]  Method(s): [x] Verbal [x] Demo [x] Handouts     Knowledge of home program:  [] Good [x] Fair [] Poor [x] With assist from family/caregiver      Plan:   [x] Continue current frequency toward long and short term goals.     [] Specific Instructions for subsequent treatments:      Charges   Minutes   Units   Evaluation (40943)            Low            Moderate            High     Manual Therapy (29910) 55 4   Therapeutic activities (65906)     Therapeutic Exercise (75339) 15 1   Self care/home mgmt (94344) 20 1   Vasopneumatic Pump (77137)     Total Treatment Time    90 6        Medicare Tracking Totals   Today 142.99   Previous  288.31   Grand Total 431.30       Time In:  1030  Time Out: 1200      Electronically signed by Matthew Pradhan OT on 3/4/2021 at 9:32 AM

## 2021-03-06 NOTE — TELEPHONE ENCOUNTER
They were doing compression wraps were doing compression wraps and noticed blisters last night at 4 am she was in pain. Her leg is red inflamed and warm to touch. No fever. Getting chills. Last ATB was Doxy ended on Tuesday. No home nurse is coming out there to get a culture. I have sent in a course of doxycycline as her wounds have responded to this in the past.  I have educated them that if her wounds do not start to improve or she becomes hypotensive tachycardic or febrile that they may need to go to the ER to make sure she is not becoming septic. They are educated to make an appointment on Monday if wounds are not improving for possible culture to make sure we have selected a correct antibiotic. I have reviewed the pictures that were sent in my chart patient's daughter-in-law states wounds are worse today.

## 2021-03-08 NOTE — TELEPHONE ENCOUNTER
Connie- Caregiver calling and states that the pt has blisters and and sores on both legs that are burning. She states that the home nurse did do a culture and she has been taking the ABX. Please advise.

## 2021-03-09 NOTE — PROGRESS NOTES
[facility] PRIMARY CARE  [address] [facility] [address]  [address]: [address] is a 79 y.o. female who presents today for her medical conditions/complaintsas noted below. Chief Complaint   Patient presents with    Leg Pain     infection on both legs and many wounds    Other     blisters on top of right foot and left toe       HPI:     HPI:   The patient is a pleasant 70-year-old female who comes in with concerns of leg wound infections. The patient has been seen at the lymphedema clinic and was recently started on compression boots. The patient used the compression boots and then her legs began to blister and wounds open. The patient was recently on antibiotics however once antibiotics stopped the infection immediately returned. Over the weekend I received a call regarding this worsening infection and started the patient back on antibiotics at that time. Today the patient's wounds and infection seem to be getting better however the patient's edema is worsening due to not been able to tolerate the lymphedema boots.     LDL Cholesterol (mg/dL)   Date Value   06/24/2016 72   12/01/2015 68     LDL Calculated (mg/dL)   Date Value   01/26/2021 61   03/11/2019 88   07/06/2017 76       (goal LDL is <100)   AST (U/L)   Date Value   01/07/2021 22     ALT (U/L)   Date Value   01/07/2021 18     BUN (mg/dL)   Date Value   01/07/2021 22     BP Readings from Last 3 Encounters:   03/09/21 (!) 140/84   02/11/21 126/70   01/27/21 130/80          (goal 120/80)    Past Medical History:   Diagnosis Date    Acute upper respiratory infection     Arthritis 6/23/2017    Backache     Bell's palsy     Benign hypertension with CKD (chronic kidney disease) stage III 7/15/2020    Cellulitis     Cellulitis 8/11/2020    Cellulitis of left lower extremity 7/26/2019    Chronic diastolic heart failure (HCC)     Chronic kidney disease, stage III (moderate)     CKD (chronic kidney disease) stage 3, GFR 30-59 ml/min 6/15/2020    Coronary artery disease     Cough     Diabetes ([address]     Diabetes mellitus ([address]     Diarrhea 12/8/2015    H/O acute bronchitis with bronchospasm     H/O acute sinusitis     H/O allergic reaction     H/O allergic rhinitis     H/O tinea cruris     Hx of heart artery stent     heart stents x 4    Hyperlipidemia     Hypertension     Joint pain, hip     LVH (left ventricular hypertrophy)     MI (myocardial infarction) ([address]     Morbid obesity ([address]     Otitis externa     Pulmonary hypertension ([address]     Right bundle branch block     Seizure disorder ([address]     Late effects of CVA    Subarachnoid bleed ([address]     Subarachnoid bleed ([address] march 28, 2016    Tendonitis, bicipital       Past Surgical History:   Procedure Laterality Date    CARDIAC CATHETERIZATION      heart stents x 4    CATARACT REMOVAL      Multiple surgeries    CATARACT REMOVAL WITH IMPLANT [facility] 11/01/2018    [facility]    CATARACT REMOVAL WITH IMPLANT Right 03/05/2019    [facility]    INTRACAPSULAR CATARACT EXTRACTION Right 3/5/2019    EYE CATARACT EMULSIFICATION IOL IMPLANT performed by Triny Jauregui MD at [address] [place] [address] Left     PACEMAKER PLACEMENT      NM XCAPSL CTRC RMVL INSJ IO LENS PROSTH W/O ECP Left 11/1/2018    EYE CATARACT EMULSIFICATION IOL IMPLANT performed by Triny Jauregui MD at [place] [address]  12/9/15       Family History   Problem Relation Age of Onset    Diabetes Father     Hypertension Father     Cancer Father         colon cancer    Diabetes Mother     Heart Disease Mother     Diabetes Brother     Heart Disease Brother     Colon Cancer Father        Social History     Tobacco Use    Smoking status: Never Smoker    Smokeless tobacco: Never Used   Substance Use Topics    Alcohol use: No     Alcohol/week: 0.0 standard drinks     Comment: tablet 0    Ginger, Zingiber officinalis, (GINGER PO) Take by mouth      Multiple Vitamins-Minerals (MULTIVITAMIN WOMEN PO) Take by mouth      albuterol sulfate HFA (PROVENTIL HFA) 108 (90 Base) MCG/ACT inhaler Inhale 2 puffs into the lungs every 6 hours as needed for Shortness of Breath (cough) 1 Inhaler 3    calcium carbonate (OYSTER SHELL CALCIUM 500 MG) 1250 (500 Ca) MG tablet TAKE 1 TABLET BY MOUTH ONE TIME A DAY  30 tablet 1    triamcinolone (KENALOG) 0.025 % ointment Apply 1 each topically 2 times daily as needed  1    Glucose Blood (BLOOD GLUCOSE TEST STRIPS) STRP 1 strip by Does not apply route 4 times daily Indications: Mariam strips 200 strip 3    furosemide (LASIX) 40 MG tablet Take 40 mg by mouth daily Prescribed by Dr. Yuriy Bustamante (APPLE CIDER VINEGAR) TABS Take by mouth      vitamin D (ERGOCALCIFEROL) 60930 UNITS CAPS capsule TAKE 1 CAPSULE BY MOUTH ONE TIME A WEEK  4 capsule 2    clopidogrel (PLAVIX) 75 MG tablet Take 1 tablet by mouth daily 90 tablet 3    metoprolol tartrate (LOPRESSOR) 25 MG tablet Take 0.5 tablets by mouth 2 times daily Indications: per Cardiologist's order 90 tablet 3    loperamide (IMODIUM) 2 MG capsule Take 1 capsule by mouth as needed for Diarrhea 30 capsule 1    Insulin Syringe-Needle U-100 (INSULIN SYRINGE .5CC/31GX5/16\") 31G X 5/16\" 0.5 ML MISC 1 each by Does not apply route daily 100 each 3    aspirin 81 MG chewable tablet Take 1 tablet by mouth daily (Patient taking differently: Take 81 mg by mouth 2 times daily ) 30 tablet 3     No current facility-administered medications for this visit.       Allergies   Allergen Reactions    Lantus [Insulin Glargine]      Hives,itching    Metformin And Related Diarrhea    Prednisone Swelling     Facial swelling    Sertraline Other (See Comments)     hallucination    Amoxicillin Diarrhea and Nausea And Vomiting    Bactrim [Sulfamethoxazole-Trimethoprim] Rash     Rash on arms    Eggs Or Egg-Derived Products Rash    Novolog [Insulin Aspart] Itching and Rash    Zinc Itching and Rash       Health Maintenance   Topic Date Due    Shingles Vaccine (1 of 2) Never done    DEXA (modify frequency per FRAX score)  Never done    DTaP/Tdap/Td vaccine (2 - Td) 09/14/2010    Annual Wellness Visit (AWV)  Never done    Flu vaccine (1) 01/05/2022 (Originally 9/1/2020)    COVID-19 Vaccine (2 of 2 - Moderna series) 03/23/2021    Lipid screen  01/26/2022    Potassium monitoring  01/26/2022    Creatinine monitoring  01/26/2022    Pneumococcal 65+ years Vaccine  Completed    Hepatitis A vaccine  Aged Out    Hib vaccine  Aged Out    Meningococcal (ACWY) vaccine  Aged Out       Subjective:      Review of Systems   Constitutional: Negative for chills, fatigue and fever. Respiratory: Negative for cough, chest tightness and shortness of breath. Cardiovascular: Positive for leg swelling (4+ edema). Gastrointestinal: Negative for nausea and vomiting. Musculoskeletal: Positive for gait problem (hard to walk due to pain and swelling ) and myalgias. Skin: Positive for color change and wound (open wounds on legs bilaterally). Negative for rash. Objective:     BP (!) 140/84   Pulse 79   Temp 97 °F (36.1 °C)   Ht 5' 1\" (1.549 m)   Wt 259 lb 6.4 oz (117.7 kg)   SpO2 97%   BMI 49.01 kg/m²   Physical Exam  Constitutional:       General: She is not in acute distress. Appearance: Normal appearance. She is not ill-appearing. HENT:      Head: Normocephalic and atraumatic. Right Ear: External ear normal.      Left Ear: External ear normal.      Nose: Nose normal.      Mouth/Throat:      Mouth: Mucous membranes are moist.   Eyes:      Extraocular Movements: Extraocular movements intact. Conjunctiva/sclera: Conjunctivae normal.      Pupils: Pupils are equal, round, and reactive to light. Neck:      Musculoskeletal: Normal range of motion and neck supple. Vascular: No carotid bruit. Cardiovascular:      Rate and Rhythm: Normal rate and regular rhythm. Pulses: Normal pulses. Heart sounds: Normal heart sounds. Pulmonary:      Effort: Pulmonary effort is normal. No respiratory distress. Breath sounds: Normal breath sounds. Abdominal:      General: Bowel sounds are normal. There is no distension. Tenderness: There is no abdominal tenderness. Musculoskeletal: Normal range of motion. General: Swelling and tenderness present. Right lower leg: Edema present. Left lower leg: Edema present. Lymphadenopathy:      Cervical: No cervical adenopathy. Skin:     General: Skin is warm and dry. Findings: Lesion and rash present. Comments: Bilateral open wounds with swelling and erythema, actively seeping no purulent material.  Pictures placed in media of chart. Neurological:      General: No focal deficit present. Mental Status: She is alert and oriented to person, place, and time. Psychiatric:         Mood and Affect: Mood normal.         Behavior: Behavior normal.         Thought Content: Thought content normal.         Assessment:       Diagnosis Orders   1. Cellulitis of left lower extremity  [address]    doxycycline hyclate (VIBRA-TABS) 100 MG tablet    Culture, Blood 1    Culture, Blood 2   2. Edema of both legs  [address]    doxycycline hyclate (VIBRA-TABS) 100 MG tablet    Culture, Blood 1    Culture, Blood 2   3. Multiple open wounds  [address]    doxycycline hyclate (VIBRA-TABS) 100 MG tablet    Culture, Blood 1    Culture, Blood 2        Plan:        Referral to wound care. Blood cultures. Doxycycline for the next month while waiting for wound care. Keep follow-up with Dr. Linda Ordoñez  Patient and caregiver educated to return to the office if any signs of worsening infection or pain occur. No follow-ups on file. Orders Placed This Encounter   Procedures    Culture, Blood 1     Standing Status:   Future     Standing Expiration Date:   3/9/2022    Culture, Blood 2     Standing Status:   Future     Standing Expiration Date:   3/9/2022   [address]     Referral Priority:   Routine     Referral Type:   Eval and Treat     Referral Reason:   Specialty Services Required     Number of Visits Requested:   1     Orders Placed This Encounter   Medications    doxycycline hyclate (VIBRA-TABS) 100 MG tablet     Sig: Take 1 tablet by mouth 2 times daily     Dispense:  60 tablet     Refill:  0       Patient given educationalmaterials - see patient instructions. Discussed use, benefit, and side effectsof prescribed medications. All patient questions answered. Pt voiced understanding. Reviewed health maintenance. Instructed to continue current medications, diet andexercise. Patient agreed with treatment plan. Follow up as directed.      Electronicallysigned by SANDIE Christopher on 3/9/2021 at 2:40 PM

## 2021-03-09 NOTE — TELEPHONE ENCOUNTER
Pts daughter calling back to check on the status of this message. Per Karyn Quick Pt scheduled for today.

## 2021-03-11 NOTE — SIGNIFICANT EVENT
[x] [address] Occupational Therapy       [address] [facility], 1st Floor       Phone: [phone]       Fax: [phone] [] [facility] at [address] [place] [address]. [place], [place]       Phone: [phone]       Fax: [phone]          Occupational Therapy Cancel/No Show note    Date: 3/11/2021  Patient: Alexis Griffith  : 1940  MRN: [id number]    Cancels/No Shows to date: 1    For today's appointment patient:    [x]  Cancelled    [] Rescheduled appointment    [] No-show     Reason given by patient:    []  Patient ill    []  Conflicting appointment    [] No transportation      [] Conflict with work    [] No reason given    [] Weather related    [] COVID-19    [x] Other:      Comments: Daughter in law Barber follows up with writer following pt appointment with PCP who referred to wound care. Writer informs [facility] that we will cancel remaining visits with lymphedema clinic in order to focus on wound healing with wound care treatment at [facility] as wounds are only worsening with compression therapy provided in lymphedema clinic. Once appropriate (wounds healed/managed), pt may return to lymphedema clinic to return focus to reduction of/management of BLE swelling.        [] Next appointment was confirmed:       Electronically signed by: Vina Kawasaki, OT

## 2021-03-12 PROBLEM — R23.8 BULLOUS LESION: Status: ACTIVE | Noted: 2021-01-01

## 2021-03-12 PROBLEM — L03.119 CELLULITIS OF LOWER EXTREMITY: Status: ACTIVE | Noted: 2020-08-11

## 2021-03-12 NOTE — PROGRESS NOTES
[address] [facility] PRIMARY CARE  [address] [facility] [address]  [address]pt: [address] is a 79 y.o. female Established patient, who presents today for her medical conditions/complaintsas noted below. Chief Complaint   Patient presents with    Wound Check     Bilateral leg wounds, edema, blisters and drainage    Blister     Blister on left wrist x 1 day       HPI:     HPI- pt's legs are worse again- now back on doxy, spraying wounds with antiseptic spray and cleansing with saline. Blood in urine but thinks it is her hemorrhoids bleeding. Caregiver states there was blood in the urine and in her brief.       Reviewed prior notes None  Reviewed previous photos    LDL Cholesterol (mg/dL)   Date Value   06/24/2016 72   12/01/2015 68     LDL Calculated (mg/dL)   Date Value   01/26/2021 61   03/11/2019 88   07/06/2017 76       (goal LDL is <100)   AST (U/L)   Date Value   01/07/2021 22     ALT (U/L)   Date Value   01/07/2021 18     BUN (mg/dL)   Date Value   01/07/2021 22     Hemoglobin A1C (%)   Date Value   01/26/2021 6.3     TSH (mIU/L)   Date Value   12/02/2020 12.01 (H)     BP Readings from Last 3 Encounters:   03/12/21 128/72   03/09/21 (!) 140/84   02/11/21 126/70          (goal 120/80)    Past Medical History:   Diagnosis Date    Acute upper respiratory infection     Arthritis 6/23/2017    Backache     Bell's palsy     Benign hypertension with CKD (chronic kidney disease) stage III 7/15/2020    Cellulitis     Cellulitis 8/11/2020    Cellulitis of left lower extremity 7/26/2019    Chronic diastolic heart failure (HCC)     Chronic kidney disease, stage III (moderate)     CKD (chronic kidney disease) stage 3, GFR 30-59 ml/min 6/15/2020    Coronary artery disease     Cough     Diabetes ([address]     Diabetes mellitus ([address]     Diarrhea 12/8/2015    H/O acute bronchitis with bronchospasm     H/O acute sinusitis     H/O allergic reaction  H/O allergic rhinitis     H/O tinea cruris     Hx of heart artery stent     heart stents x 4    Hyperlipidemia     Hypertension     Joint pain, hip     LVH (left ventricular hypertrophy)     MI (myocardial infarction) ([address]     Morbid obesity (HCC)     Otitis externa     Pulmonary hypertension ([address]     Right bundle branch block     Seizure disorder ([address]     Late effects of CVA    Subarachnoid bleed ([address]     Subarachnoid bleed ([address] march 28, 2016    Tendonitis, bicipital       Past Surgical History:   Procedure Laterality Date    CARDIAC CATHETERIZATION      heart stents x 4    CATARACT REMOVAL      Multiple surgeries    CATARACT REM[facility]AL WITH IMPLANT [facility]2018    [facility]    CATARACT REMOVAL WITH IMPLANT Right 03/05/2019    [facility]    INTRACAPSULAR CATARACT EXTRACTION Right 3/5/2019    EYE CATARACT EMULSIFICATION IOL IMPLANT performed by Carlitos Gallagher MD at [address] [place] [address] Left     PACEMAKER PLACEMENT      ID XCAPSL CTRC RMVL INSJ IO LENS PROSTH W/O ECP Left 11/1/2018    EYE CATARACT EMULSIFICATION IOL IMPLANT performed by Carlitos Gallagher MD at [place] [address]  12/9/15       Family History   Problem Relation Age of Onset    Diabetes Father     Hypertension Father     Cancer Father         colon cancer    Diabetes Mother     Heart Disease Mother     Diabetes Brother     Heart Disease Brother     Colon Cancer Father        Social History     Tobacco Use    Smoking status: Never Smoker    Smokeless tobacco: Never Used   Substance Use Topics    Alcohol use: No     Alcohol/week: 0.0 standard drinks     Comment: one glass of wine per year      Current Outpatient Medications   Medication Sig Dispense Refill    magnesium oxide (MAG-OX) 400 MG tablet TAKE 1 TABLET BY MOUTH EVERY DAY      doxycycline hyclate (VIBRA-TABS) 100 MG tablet Take 1 tablet by mouth 2 times daily for 7 days 14 tablet 0    nystatin (MYCOSTATIN) 109715 UNIT/GM powder Apply topically 3 times daily 1800 g 3    Magnesium 400 MG TABS Take 400 mg/day by mouth daily 90 tablet 3    potassium chloride (KLOR-CON) 20 MEQ packet Take 20 mEq by mouth 2 times daily      metoprolol succinate (TOPROL XL) 25 MG extended release tablet Take 1 tablet by mouth daily 30 tablet 5    TRESIBA FLEXTOUCH 100 UNIT/ML SOPN Inject 30 Units into the skin nightly Patient is taking 60 units nightly 5 pen 0    insulin glulisine (APIDRA SOLOSTAR) 100 UNIT/ML injection pen Take 25 units with BF, 30 with lunch and 20 with supper 15 mL 3    levETIRAcetam (KEPPRA) 1000 MG tablet TAKE 1 TABLET TWICE A  tablet 3    furosemide (LASIX) 40 MG tablet Take 1 tablet by mouth daily 90 tablet 3    triamcinolone (ARISTOCORT) 0.5 % cream Apply topically 3 times daily. 15 g 1    aspirin 81 MG EC tablet Take 1 tablet by mouth daily 30 tablet 3    atorvastatin (LIPITOR) 10 MG tablet Take 1 tablet by mouth daily 30 tablet 3    clopidogrel (PLAVIX) 75 MG tablet Take 1 tablet by mouth daily 30 tablet 3    BD PEN NEEDLE KRISTEN U/F 32G X 4 MM MISC       simvastatin (ZOCOR) 20 MG tablet TAKE 1 TABLET NIGHTLY 90 tablet 3    sodium chloride (ALTAMIST SPRAY) 0.65 % nasal spray 1 spray by Nasal route 2 times daily 1 Bottle 3    spironolactone (ALDACTONE) 25 MG tablet TAKE 1 TABLET DAILY 90 tablet 3    Clobetasol Propionate 0.05 % LIQD Once daily for not more than 2 weeks at a time.  1 Bottle 0    acetaminophen (TYLENOL) 325 MG tablet Take 2 tablets by mouth every 6 hours as needed for Pain 40 tablet 0    Ginger, Zingiber officinalis, (GINGER PO) Take by mouth      Multiple Vitamins-Minerals (MULTIVITAMIN WOMEN PO) Take by mouth      albuterol sulfate HFA (PROVENTIL HFA) 108 (90 Base) MCG/ACT inhaler Inhale 2 puffs into the lungs every 6 hours as needed for Shortness of Breath (cough) 1 Inhaler 3    calcium carbonate (OYSTER SHELL CALCIUM 500 MG) 1250 done    DTaP/Tdap/Td vaccine (2 - Td) 09/14/2010    Annual Wellness Visit (AWV)  Never done    Flu vaccine (1) 01/05/2022 (Originally 9/1/2020)    COVID-19 Vaccine (2 - Moderna 2-dose series) 03/23/2021    Lipid screen  01/26/2022    Potassium monitoring  01/26/2022    Creatinine monitoring  01/26/2022    Pneumococcal 65+ years Vaccine  Completed    Hepatitis A vaccine  Aged Out    Hib vaccine  Aged Out    Meningococcal (ACWY) vaccine  Aged Out       Subjective:      Review of Systems   Constitutional: Negative for chills and fever. HENT: Negative for congestion. Respiratory: Positive for cough (phlegmy, in the morning). Negative for shortness of breath. Cardiovascular: Negative for chest pain and palpitations. Neurological: Negative for dizziness. Objective:     /72   Pulse 74   Temp 96.8 °F (36 °C)   Wt 261 lb (118.4 kg)   SpO2 96%   BMI 49.32 kg/m²   Physical Exam  Constitutional:       Appearance: Normal appearance. HENT:      Head: Normocephalic and atraumatic. Skin:     Comments: See photos   Neurological:      Mental Status: She is alert. Assessment:       Diagnosis Orders   1. Gross hematuria  POCT Urinalysis No Micro (Auto)   2. Bullous lesion          Plan:    elevate feet as much as possible  Schedule with Wade Aragon for biopsy of blisters- r/o pemphigus  Return in about 4 weeks (around 4/9/2021). Orders Placed This Encounter   Procedures    POCT Urinalysis No Micro (Auto)     No orders of the defined types were placed in this encounter. Patient given educationalmaterials - see patient instructions. Discussed use, benefit, and side effectsof prescribed medications. All patient questions answered. Pt voiced understanding. Reviewed health maintenance. Instructed to continue current medications, diet andexercise. Patient agreed with treatment plan. Follow up as directed.      Electronicallysigned by Lulu Barnett MD on 3/12/2021 at 11:44 AM

## 2021-03-15 NOTE — PROGRESS NOTES
Skin Biopsy Procedure Note  3/15/2021  Alejandro Asa  1940    /76   Pulse 60   Temp 97 °F (36.1 °C)   Ht 5' 1\" (1.549 m)   SpO2 97%   BMI 49.32 kg/m²   VITALS REVIEWED    Allergies   Allergen Reactions    Lantus [Insulin Glargine]      Hives,itching    Metformin And Related Diarrhea    Prednisone Swelling     Facial swelling    Sertraline Other (See Comments)     hallucination    Amoxicillin Diarrhea and Nausea And Vomiting    Bactrim [Sulfamethoxazole-Trimethoprim] Rash     Rash on arms    Eggs Or Egg-Derived Products Rash    Novolog [Insulin Aspart] Itching and Rash    Zinc Itching and Rash       Chief Complaint   Patient presents with    Procedure     bx of blisters on legs        Lesion:  · 1. Right forearm        Indication for Biopsy 1: bulla      Procedure: The lesion(s) was cleansed with Alcohol.  2% lidocaine with epinephrine was used for local anaesthesia. A 5 mm  punch was used to obtain a specimen. Wound will close by secondary intention. The specimen(s) was    preserved and sent for pathological examination. The biopsy site(s) was  cleansed and hemostasis using ACl and a pressure dressing(s) was achieved with good results. The patient was instructed on wound care. No complications occurred and the patient tolerated the procedure well. Viral and bacterial cultures were taken after bulla was opened with 11 blade. Diagnosis Orders   1. Neoplasm of uncertain behavior of skin         Follow Up: Wound care discussed. Keep well moisturized. Watch for signs of infection which would include:  Redness, swelling, fever. If signs appear, please return to office. Return for Will call with results.        Electronically signed by Leo Rivas PA-C on 3/15/2021 at 12:39 PM

## 2021-03-15 NOTE — PROGRESS NOTES
Patient arrives to [facility] room 2078 at this time direct admit. Oriented to room, VS obtained. RN will continue to monitor.

## 2021-03-15 NOTE — PROGRESS NOTES
Moderate mitral regurgitation    Venous insufficiency (chronic) (peripheral)    Peripheral vascular disease, unspecified (HCC)    Lymphedema, not elsewhere classified    Bullous lesion     Allergies:  Lantus [insulin glargine], Metformin and related, Prednisone, Sertraline, Amoxicillin, Bactrim [sulfamethoxazole-trimethoprim], Eggs or egg-derived products, Novolog [insulin aspart], and Zinc     Temp max: 98.4 F    Recent Labs     03/15/21  1450   BUN 39*   CREATININE 1.39*     No intake or output data in the 24 hours ending 03/15/21 1617  Culture Date      Source                       Results  None pending at this time    Ht Readings from Last 1 Encounters:   03/15/21 5' 1\" (1.549 m)        Wt Readings from Last 1 Encounters:   03/15/21 260 lb (117.9 kg)       Body mass index is 49.13 kg/m². Estimated Creatinine Clearance: 37 mL/min (A) (based on SCr of 1.39 mg/dL (H)). Goal Trough Level: 10-20 mcg/mL    Assessment/Plan:  Will initiate Vancomycin with a one time loading dose of 2500 mg x1, followed by 1750 q36h. Timing of trough level will be determined based on culture results, renal function, and clinical response. Thank you for the consult. Will continue to follow.     Jaymie Wetzel,PharmD,  3/15/2021, 4:21 PM

## 2021-03-16 NOTE — H&P
[facility])     Late effects of CVA    Subarachnoid bleed (HCC)     Subarachnoid bleed ([facility] march 28, 2016    Tendonitis, bicipital        Past Surgical History:        Procedure Laterality Date    CARDIAC CATHETERIZATION      heart stents x 4    CATARACT REMOVAL      Multiple surgeries    CATARACT REMOVAL WITH IMPLANT Left 11/01/2018    [facility]    CATARACT REMOVAL WITH IMPLANT Right 03/05/2019    [facility]    INTRACAPSULAR CATARACT EXTRACTION Right 3/5/2019    EYE CATARACT EMULSIFICATION IOL IMPLANT performed by Marielos Heredia MD at [address] [place] [address] Left     PACEMAKER PLACEMENT      TX XCAPSL CTRC RMVL INSJ IO LENS PROSTH W/O ECP Left 11/1/2018    EYE CATARACT EMULSIFICATION IOL IMPLANT performed by Marielos Heredia MD at [place] [address]  12/9/15       Medications Prior to Admission:    Prior to Admission medications    Medication Sig Start Date End Date Taking?  Authorizing Provider   clopidogrel (PLAVIX) 75 MG tablet Take 1 tablet by mouth daily 3/15/21  Yes Wesley Reid PA-C   magnesium oxide (MAG-OX) 400 MG tablet TAKE 1 TABLET BY MOUTH EVERY DAY 3/1/21  Yes Historical Provider, MD   nystatin (MYCOSTATIN) 085431 UNIT/GM powder Apply topically 3 times daily 3/4/21  Yes Luis M Cooper MD   Magnesium 400 MG TABS Take 400 mg/day by mouth daily 3/1/21  Yes Fawn Kay MD   potassium chloride (KLOR-CON) 20 MEQ packet Take 20 mEq by mouth 2 times daily   Yes Historical Provider, MD   metoprolol succinate (TOPROL XL) 25 MG extended release tablet Take 1 tablet by mouth daily 2/11/21  Yes Luis M Cooper MD   TRESIBA FLEXTOUCH 100 UNIT/ML SOPN Inject 30 Units into the skin nightly Patient is taking 60 units nightly 2/11/21  Yes Luis M Cooper MD   insulin glulisine (APIDRA SOLOSTAR) 100 UNIT/ML injection pen Take 25 units with BF, 30 with lunch and 20 with supper 2/11/21  Yes Hollie Rivas MD   levETIRAcetam (KEPPRA) 1000 MG tablet TAKE 1 TABLET TWICE A DAY 2/8/21  Yes Hollie Rivas MD   furosemide (LASIX) 40 MG tablet Take 1 tablet by mouth daily 2/5/21  Yes Hollie Rivas MD   atorvastatin (LIPITOR) 10 MG tablet Take 1 tablet by mouth daily 12/5/20  Yes Korey Griffin DO   BD PEN NEEDLE KRISTEN U/F 32G X 4 MM MISC  9/1/20  Yes Historical Provider, MD   simvastatin (ZOCOR) 20 MG tablet TAKE 1 TABLET NIGHTLY 6/29/20  Yes Hollie Rivas MD   spironolactone (ALDACTONE) 25 MG tablet TAKE 1 TABLET DAILY 3/23/20  Yes Hollie Rivas MD   Cristian, Zingiber officinalis, (CRISTIAN PO) Take by mouth   Yes Historical Provider, MD   Multiple Vitamins-Minerals (MULTIVITAMIN WOMEN PO) Take by mouth   Yes Historical Provider, MD   calcium carbonate (OYSTER SHELL CALCIUM 500 MG) 1250 (500 Ca) MG tablet TAKE 1 TABLET BY MOUTH ONE TIME A DAY  1/22/18  Yes Hollie Rivas MD   Glucose Blood (BLOOD GLUCOSE TEST STRIPS) STRP 1 strip by Does not apply route 4 times daily Indications: Mariam strips 8/21/17  Yes Hollie Rivas MD   Misc Natural Products (APPLE CIDER VINEGAR) TABS Take by mouth   Yes Historical Provider, MD   vitamin D (ERGOCALCIFEROL) 73718 UNITS CAPS capsule TAKE 1 CAPSULE BY MOUTH ONE TIME A WEEK  3/7/17  Yes Hollie Rivas MD   metoprolol tartrate (LOPRESSOR) 25 MG tablet Take 0.5 tablets by mouth 2 times daily Indications: per Cardiologist's order 2/13/17  Yes Hollie Rivas MD   Insulin Syringe-Needle U-100 (INSULIN SYRINGE .5CC/31GX5/16\") 31G X 5/16\" 0.5 ML MISC 1 each by Does not apply route daily 11/4/16  Yes Hollie Rivas MD   doxycycline hyclate (VIBRA-TABS) 100 MG tablet Take 1 tablet by mouth 2 times daily 3/9/21 4/8/21  SANDIE Gomez   triamcinolone (ARISTOCORT) 0.5 % cream Apply topically 3 times daily.  1/5/21   Alicia Cazares PA   sodium chloride (ALTAMIST SPRAY) 0.65 % nasal spray 1 spray by Nasal route 2 times daily 5/6/20   Candi Nichole MD   Clobetasol Propionate 0.05 % LIQD Once daily for not more than 2 weeks at a time. 10/2/19   Ally Mojica PA-C   acetaminophen (TYLENOL) 325 MG tablet Take 2 tablets by mouth every 6 hours as needed for Pain 7/22/19   Patti Salinas MD   albuterol sulfate HFA (PROVENTIL HFA) 108 (90 Base) MCG/ACT inhaler Inhale 2 puffs into the lungs every 6 hours as needed for Shortness of Breath (cough) 3/23/18   Candi Nichole MD   triamcinolone (KENALOG) 0.025 % ointment Apply 1 each topically 2 times daily as needed 12/7/17   Historical Provider, MD   loperamide (IMODIUM) 2 MG capsule Take 1 capsule by mouth as needed for Diarrhea 1/9/17   Candi Nichole MD       Allergies:  Lantus [insulin glargine], Metformin and related, Prednisone, Sertraline, Amoxicillin, Bactrim [sulfamethoxazole-trimethoprim], Eggs or egg-derived products, Novolog [insulin aspart], and Zinc    Social History:   TOBACCO:   reports that she has never smoked. She has never used smokeless tobacco.  ETOH:   reports no history of alcohol use. OCCUPATION:      Family History:       Problem Relation Age of Onset    Diabetes Father     Hypertension Father     Cancer Father         colon cancer    Diabetes Mother     Heart Disease Mother     Diabetes Brother     Heart Disease Brother     Colon Cancer Father        REVIEW OF SYSTEMS:  Negative except for itching, rash with blisters, wounds on legs, swelling, weakness. No nausea or vomiting. Chronic diarrhea. Physical Exam:    Vitals: BP (!) 105/53   Pulse 72   Temp 97.5 °F (36.4 °C) (Oral)   Resp 15   Ht 5' 1\" (1.549 m)   Wt 260 lb (117.9 kg)   SpO2 95%   BMI 49.13 kg/m²   General appearance: alert, appears stated age and cooperative  Skin: Skin color- legs with erythema, multiple scratch marks and blisters on extremities. HEENT: Head: Normocephalic, no lesions, without obvious abnormality.   Neck: supple, diastolic heart failure ([facility] [address] I50.32    Fall from chair W07. XXXA    Heart block AV third degree ([facility]) I44.2    Hemorrhagic cerebrovascular accident (CVA) ([facility] [facility] I61.9    Mobility impaired Z74.09    Coronary artery disease due to calcified coronary lesion I25.10, I25.84    Stroke, hemorrhagic ([facility]) I61.9    Diabetic autonomic neuropathy ([facility]) E11.43    Hyperlipidemia E78.5    Insomnia G47.00    SAH (subarachnoid hemorrhage) ([facility]) I60.9    Fall from other slipping, tripping, or stumbling W01. 0XXA    Post-traumatic seizures ([facility] [facility] R56.1    Tra[facility] S06. 9X9A    Encephalopathy acute G93.40    Type 2 diabetes mellitus, uncontrolled ([facility]) E11.65    Chronic diastolic CHF (congestive heart failure) ([facility]) I50.32    Morbid obesity with BMI of 45.0-49.9, adult ([facility]) E66.01, Z68.42    History of cerebellar hemorrhage Z86.79    Ataxia R27.0    Syncope R55    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) ([facility] I69.398, R56.9    Seizure disorder ([facility] G40.909    History of cerebral infarction Z86.73    Vitamin D deficiency E55.9    Localized edema R60.0    CKD (chronic kidney disease) stage 3, GFR 30-59 ml/min N18.30    Benign hypertension with CKD (chronic kidney disease) stage III I12.9, N18.30    Arthritis M19.90    Chest pain of uncertain etiology D19.4    Dry cough R05    Easy bruising R23.8    Edema of both legs R60.0    Hyperglycemia due to type 2 diabetes mellitus ([facility]) E11.65    Hyperkalemia E87.5    Old myocardial infarction I25.2    Presence of drug coated stent in right coronary artery Z95.5    Right bundle-branch block I45.10    Bell's palsy G51.0    Cellulitis of lower extremity L03.119    LVH (left ventricular hypertrophy) I51.7    Mild pulmonary hypertension ([facility]) I27.20    Moderate mitral regurgitation I34.0    Venous insufficiency (chronic) (peripheral) I87.2    Peripheral vascular disease, unspecified ([facility]) I73.9    Lymphedema, not elsewhere classified I89.0    Bullous lesion R23.8       Electronically signed by Alex Anderson MD on 3/16/2021 at 9:22 AM

## 2021-03-16 NOTE — PROGRESS NOTES
Comprehensive Nutrition Assessment    Type and Reason for Visit:  Initial, Positive Nutrition Screen(wounds)    Nutrition Recommendations/Plan: Will continue to provide 4 carbohydrate choices per tray and add Ensure Clear supplements once daily to increase protein intake. Nutrition Assessment:  Pt admitted due to cellulitis of legs. H/O CHF, CKD, DM, CVA noted. Pt states she did not eat much lunch. She is willing to try Ensure Clear supplement but won't drink it if it makes her go to the bathroom because moving fast is difficult for her. Malnutrition Assessment:  Malnutrition Status: At risk for malnutrition (Comment)    Context:  Chronic Illness     Findings of the 6 clinical characteristics of malnutrition:  Energy Intake:  No significant decrease in energy intake  Weight Loss:  Unable to assess(stated wt)     Body Fat Loss:  No significant body fat loss     Muscle Mass Loss:  No significant muscle mass loss    Fluid Accumulation:  7 - Severe Extremities   Strength:  Not Performed    Estimated Daily Nutrient Needs:  Energy (kcal):  15 kcal/kg= 1800 kcal; Weight Used for Energy Requirements:  Current     Protein (g):  1.5g/kg= 70 g; Weight Used for Protein Requirements:  Ideal           Nutrition Related Findings:  Severe pitting edema BLE, Labs (3/16) Na/K 133/5.1 BUN/Cr 40/1.16, Glu 157 -161 Meds: Reviewed, BM 3/13      Wounds:  Multiple, Skin Tears       Current Nutrition Therapies:    DIET CARB CONTROL;   Dietary Nutrition Supplements: Clear Liquid Oral Supplement    Anthropometric Measures:  · Height: 5' 1\" (154.9 cm)  · Current Body Weight: 260 lb (117.9 kg)   · Admission Body Weight: 260 lb (117.9 kg)    · Usual Body Weight: 274 lb (124.3 kg)(12/20)     · Ideal Body Weight: 105 lbs;BMI: 49.2  · BMI Categories: Obese Class 3 (BMI 40.0 or greater)       Nutrition Diagnosis:   · Increased nutrient needs related to (healing, fluid losses) as evidenced by wounds    Nutrition Interventions: Food and/or Nutrient Delivery:  Continue Current Diet, Start Oral Nutrition Supplement  Nutrition Education/Counseling:  No recommendation at this time   Coordination of Nutrition Care:  Continue to monitor while inpatient    Goals:  po intake greater than 75%       Nutrition Monitoring and Evaluation:   Food/Nutrient Intake Outcomes:  Food and Nutrient Intake, Supplement Intake  Physical Signs/Symptoms Outcomes:  Biochemical Data, GI Status, Skin, Weight, Fluid Status or Edema     Discharge Planning:     Too soon to determine     Electronically signed by Snehal Paniagua RD, LD on 3/16/21 at 2:51 PM EDT    Contact: [phone]

## 2021-03-16 NOTE — PLAN OF CARE
Nutrition Problem #1: Increased nutrient needs  Intervention: Food and/or Nutrient Delivery: Continue Current Diet, Start Oral Nutrition Supplement  Nutritional Goals: po intake greater than 75%

## 2021-03-16 NOTE — CARE COORDINATION
CASE MANAGEMENT NOTE:    Admission Date:  3/15/2021 Amparo Gracia is a 79 y.o.  female    Admitted for : Cellulitis [L03.90]  Cellulitis of lower extremity, unspecified laterality [L03.119]    Met with:  Patient    PCP:  Dr. Bon Nicholas:  [employer]      Current Residence/ Living Arrangements:  at home dependent on nursing care, Per Dr. Valeri Mccray, she thinks pt. Has 24/7 care at home from [facility]             Current Services PTA:  Yes, 24/7 aid service, they do dressing changes    Is patient agreeable to VNS: No    Freedom of choice provided:  No    List of [address] provided: No    VNS chosen:  No, Will continue w/ current services, follow PT/OT rec    DME:  straight cane, walker and shower chair, GB, Glucometer    Home Oxygen: No    Nebulizer: No    CPAP/BIPAP: No    Supplier: N/A    Potential Assistance Needed: Yes, Will follow PT/OT rec    SNF needed: Will follow PT/OT rec    Freedom of choice and list provided: No    Pharmacy:  [facility]    Does Patient want to use MEDS to BEDS? No    Is patient currently receiving oral anticoagulation therapy? No    Is the Patient an [facility] with Readmission Risk Score greater than 14%? Yes  If yes, pt needs a follow up appointment made within 7 days. Family Members/Caregivers that pt would like involved in their care:    Yes    If yes, list name here:  Son, Linda Stearns, Son Eliseo    Transportation Provider:  [facility]             Is patient in Isolation/One on One/Altered Mental Status? No  If yes, skip next question. If no, would they like an I-Pad to  use? No  If yes, call [phone]. Discharge Plan:  3/16/21 [employer] Pt. Lives alone, Per Dr. Valeri Mccray, she thinks she has 24/7 care at home from [facility], who do luciano leg dressing changes. PT/OT on board, will follow for any rec/needs. Punch BX of arm at the office prior to admission. IV Bumex/Vanco. Unsure if she would be agreeable to SNF. Orange header 23%.  Bri will need signed/completed, if needed, Will follow//KB                 Electronically signed by: Crissy Johansen RN on 3/16/2021 at 9:59 AM

## 2021-03-16 NOTE — PROGRESS NOTES
Physical Therapy    Facility/Department: [facility] MED SURG  Initial Assessment    NAME: Colton Reagan  : 1940  MRN: [id number]    Date of Service: 3/16/2021    Discharge Recommendations:  Patient would benefit from continued therapy after discharge   PT Equipment Recommendations  Equipment Needed: No    Assessment   Body structures, Functions, Activity limitations: Decreased functional mobility ; Decreased strength;Decreased endurance  Assessment: Impaired mobility due to decreased strength and  endurance  History: CVA  Exam: decreased endurance, strength, mobility  Clinical Presentation: evolving  REQUIRES PT FOLLOW UP: Yes  Activity Tolerance  Activity Tolerance: Patient limited by endurance       Patient Diagnosis(es): There were no encounter diagnoses. has a past medical history of Acute upper respiratory infection, Arthritis, Backache, Bell's palsy, Benign hypertension with CKD (chronic kidney disease) stage III, Cellulitis, Cellulitis, Cellulitis of left lower extremity, Chronic diastolic heart failure (HCC), Chronic kidney disease, stage III (moderate), CKD (chronic kidney disease) stage 3, GFR 30-59 ml/min, Coronary artery disease, Cough, Diabetes ([address] Diabetes mellitus ([address] Diarrhea, H/O acute bronchitis with bronchospasm, H/O acute sinusitis, H/O allergic reaction, H/O allergic rhinitis, H/O tinea cruris, Hx of heart artery stent, Hyperlipidemia, Hypertension, Joint pain, hip, LVH (left ventricular hypertrophy), MI (myocardial infarction) ([address] Morbid obesity ([address] Otitis externa, Pulmonary hypertension ([address] Right bundle branch block, Seizure disorder ([address] Subarachnoid bleed ([address] Subarachnoid bleed ([address] and Tendonitis, bicipital.   has a past surgical history that includes Cataract removal; Vena Cava Filter Placement; Cardiac catheterization; Kidney removal (Left); Vena Cava Filter Placement (12/9/15);  Cataract removal with implant (Left, 2018); pr xcapsl ctrc rmvl insj io lens prosth w/o ecp (Left, 11/1/2018); Cataract removal with implant (Right, 03/05/2019); Intracapsular cataract extraction (Right, 3/5/2019); and pacemaker placement. Restrictions  Restrictions/Precautions  Restrictions/Precautions: Fall Risk        Subjective  General  Family / Caregiver Present: No  Follows Commands: Within Functional Limits  Subjective  Subjective: pt pleasant and cooperative  Pain Screening  Patient Currently in Pain: Denies  Vital Signs  Patient Currently in Pain: Denies       Orientation  Orientation  Overall Orientation Status: Impaired  Orientation Level: Oriented to place; Disoriented to time;Disoriented to situation  Social/Functional History  Social/Functional History  Lives With: Alone  Type of Home: House  Home Layout: One level  Home Access: Level entry  Bathroom Shower/Tub: Walk-in shower  Bathroom Toilet: Standard  Bathroom Equipment: Shower chair  Home Equipment: 4 wheeled walker, Lift chair, Alert [employer] Help From: Personal care attendant(pt states that someone is with her during the day and night)  ADL Assistance: (needs assist with showers)  Ambulation Assistance: Independent(with walker)  Transfer Assistance: Independent(from lift chair)  Additional Comments: sleeps in lift chair       Objective     Observation/Palpation  Observation: ace wraps to B lower legs and feet    AROM RLE (degrees)  RLE AROM: WFL  RLE General AROM: limited by edema  AROM LLE (degrees)  LLE AROM : WFL  LLE General AROM: limited by edema  Strength RLE  Strength RLE: WFL  Comment: grossly 3/5  Strength LLE  Strength LLE: WFL  Comment: grossly 3/5        Bed mobility  Supine to Sit: Moderate assistance  Sit to Supine: Maximum assistance  Scooting: Moderate assistance  Comment: head of bed elevated and use of rails(pt sleeps in lift chair at home)  Transfers  Sit to Stand: Moderate Assistance  Stand to sit: Moderate Assistance  Lateral Transfers:  Moderate Assistance(pt able to partially stand and scoot up towards the head of the bed)  Comment: pt stood bedside with min assist on [address] for approx 2 min  Ambulation  Ambulation?: No     Balance  Sitting - Static: Good  Sitting - Dynamic: Good        Plan   Plan  Times per week: 5x/wk  Current Treatment Recommendations: Strengthening, Functional Mobility Training, Transfer Training, Gait Training  Safety Devices  Type of devices: Left in bed, Patient at risk for falls, Call light within reach, Bed alarm in place      Goals  Short term goals  Time Frame for Short term goals: 3-4 visits  Short term goal 1: standing from raised bed with SBA  Short term goal 2: ambulate with Rollator x 20-25ft  Short term goal 3: pt able to tolerate 15-20 min of seated activity/exercise       Therapy Time   Individual Concurrent Group Co-treatment   Time In 1505         Time Out 1535         Minutes [address] [place] [address]

## 2021-03-16 NOTE — PROGRESS NOTES
RN called pharmacy to get pt home med [place] ordered and Apidra into correct doses.  Stated pharmacist will review and add/correct orders

## 2021-03-16 NOTE — FLOWSHEET NOTE
03/16/21 1045   Encounter Summary   Services provided to: Patient not available  (patient with medical staff)

## 2021-03-17 NOTE — PROGRESS NOTES
Physical Therapy  DATE: 3/17/2021    NAME: Petra Akbar  MRN: [id number]   : 1940    Patient not seen this date for Physical Therapy due to:  [] Blood transfusion in progress  [] Cancel by RN  [] Hemodialysis  [x]  Refusal by Patient; checked on patient at [phone], 2 nursing students in the room assisting the patient on the commode. Checked back at [phone], patient refusing stating she is tired  And not keeping her eyes open while talking with writer. Will continue to follow.   [] Spine Precautions   [] Strict Bedrest  [] Surgery  [] Testing      [] Other        [] PT being discontinued at this time. Patient independent. No further needs. [] PT being discontinued at this time as the patient has been transferred to hospice care. No further needs.     Electronically signed by Luis Sneed PTA on 3/17/21 at 3:31 PM EDT

## 2021-03-17 NOTE — PROGRESS NOTES
SW informed that pt's son requested [facility]. SW sent referral. Sangeetha Leigh will accept and started precert this day.

## 2021-03-17 NOTE — PROGRESS NOTES
appearance: alert and cooperative with exam, but is sleepy  Neck: supple, symmetrical, trachea midline  Lungs: clear to auscultation bilaterally  Heart: regular rate and rhythm, S1, S2 normal, no murmur, click, rub or gallop  Abdomen: normal findings: soft, non-tender  Extremities: ACE wraps on- swelling some better- but hands swollen now. Neurologic: Mental status: Alert, oriented, thought content appropriate    Assessment and Plan:   1. Cellulitis- continue ace wraps- will check wounds tomorrow. Continue vanco- check labs daily. 2. DM- has been pretty well controlled at home, but is allergic to most insulins.-  Continue her apidra only.     3. Allergic reaction to lantus- claritin daily, and will change atarax to prn    Patient Active Problem List:     Hypoglycemia     Type 2 diabetes mellitus (HCC)     Coronary artery disease     Chronic kidney disease, stage III (moderate)     Essential hypertension     Hyperlipidemia     LVH (left ventricular hypertrophy)     Pulmonary hypertension (HCC)     Right bundle branch block     Seizure disorder (HCC)     Chronic diastolic heart failure ([address]     Fall from chair     Heart block AV third degree ([address]     Hemorrhagic cerebrovascular accident (CVA) ([address]     Mobility impaired     Coronary artery disease due to calcified coronary lesion     Stroke, hemorrhagic ([address]     Diabetic autonomic neuropathy ([address]     Hyperlipidemia     Insomnia     SAH (subarachnoid hemorrhage) ([address]     Fall from other slipping, tripping, or stumbling     Post-traumatic seizures ([address]     Traumatic brain injury ([address]     Encephalopathy acute     Type 2 diabetes mellitus, uncontrolled (HCC)     Chronic diastolic CHF (congestive heart failure) ([address]     Morbid obesity with BMI of 45.0-49.9, adult ([address]     History of cerebellar hemorrhage     Ataxia     Syncope     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) ([address]     Seizure disorder ([address]     History of cerebral infarction Vitamin D deficiency     Localized edema     CKD (chronic kidney disease) stage 3, GFR 30-59 ml/min     Benign hypertension with CKD (chronic kidney disease) stage III     Arthritis     Chest pain of uncertain etiology     Dry cough     Easy bruising     Edema of both legs     Hyperglycemia due to type 2 diabetes mellitus (HCC)     Hyperkalemia     Old myocardial infarction     Presence of drug coated stent in right coronary artery     Right bundle-branch block     Bell's palsy     Cellulitis of lower extremity     LVH (left ventricular hypertrophy)     Mild pulmonary hypertension (HCC)     Moderate mitral regurgitation     Venous insufficiency (chronic) (peripheral)     Peripheral vascular disease, unspecified (HCC)     Lymphedema, not elsewhere classified     Bullous lesion      Electronically signed by Alex Anderson MD on 3/17/2021 at 12:02 PM

## 2021-03-17 NOTE — TELEPHONE ENCOUNTER
Patient's [facility] wound care appointment canceled today due to being in the hospital.

## 2021-03-17 NOTE — CARE COORDINATION
ONGOING DISCHARGE PLAN:    Unable to speak to pt. At this time. Pt. Sleeping soundly in bed. Writer did call Pt's Son, Laila Balbuena, & he stated, Wolfgang Centeno spoke to his Mom yesterday & she felt she needs more care than what she is getting at home\". Pt. Has been to [facility] in the past & he would like LSW to follow for this again. Second Choice, would be [facility]. PT/OT on board, will follow for rec. Pt. Remains on IV Bumex/Vanco.     Pt. Does lives alone & has 24/7 aids, from [address] will continue when DC. Will continue to follow for additional discharge needs. Electronically signed by Daryle Peach, RN on 3/17/2021 at 3:09 PM     ADD: Writer was notified by [facility], that their second choice is [facility], Not [facility]. Writer notified, LSW of this & she will follow.

## 2021-03-17 NOTE — PROGRESS NOTES
[address]   Occupational Therapy Evaluation  Date: 3/17/21  Patient Name: Leda Montero       Room: 2229/6147-45  MRN: [id number]  Account: [de-identified]   : 1940  (79 y.o.) Gender: female     Discharge Recommendations:  Further Occupational Therapy is recommended upon facility discharge. OT Equipment Recommendations  Equipment Needed: Yes  Mobility Devices: ADL Assistive Devices  ADL Assistive Devices: Ulices Carcamo    Referring Practitioner: Dr Kayleigh Mojica  Diagnosis: Cellulitis  Additional Pertinent Hx: Lower Extremity lymphedema    Treatment Diagnosis: Altered Ability to participate in care tasks  Past Medical History:  has a past medical history of Acute upper respiratory infection, Arthritis, Backache, Bell's palsy, Benign hypertension with CKD (chronic kidney disease) stage III, Cellulitis, Cellulitis, Cellulitis of left lower extremity, Chronic diastolic heart failure (HCC), Chronic kidney disease, stage III (moderate), CKD (chronic kidney disease) stage 3, GFR 30-59 ml/min, Coronary artery disease, Cough, Diabetes ([address] Diabetes mellitus ([address] Diarrhea, H/O acute bronchitis with bronchospasm, H/O acute sinusitis, H/O allergic reaction, H/O allergic rhinitis, H/O tinea cruris, Hx of heart artery stent, Hyperlipidemia, Hypertension, Joint pain, hip, LVH (left ventricular hypertrophy), MI (myocardial infarction) ([address] Morbid obesity ([address] Otitis externa, Pulmonary hypertension ([address] Right bundle branch block, Seizure disorder ([address] Subarachnoid bleed ([address] Subarachnoid bleed ([address] and Tendonitis, bicipital.  Past Surgical History:   has a past surgical history that includes Cataract removal; Vena Cava Filter Placement; Cardiac catheterization; Kidney removal (Left); Vena Cava Filter Placement (12/9/15); Cataract removal with implant (Left, 2018); pr xcapsl ctrc rmvl insj io lens prosth w/o ecp (Left, 2018);  Cataract removal with implant (Right, 03/05/2019); Intracapsular cataract extraction (Right, 3/5/2019); and pacemaker placement. Restrictions  Restrictions/Precautions: Fall Risk  Other position/activity restrictions: History of Lower Extremity Lymphedema now with increased swelling  and hand swelling     Vitals  Temp: 98.1 °F (36.7 °C)  Pulse: 60  Resp: 16  BP: (!) 113/49  Height: 5' 1\" (154.9 cm)  Weight: 259 lb 7.7 oz (117.7 kg)  BMI (Calculated): 49.1  Oxygen Therapy  SpO2: 92 %  Pulse Oximeter Device Mode: Intermittent  Pulse Oximeter Device Location: Finger  O2 Device: None (Room air)  Level of Consciousness: Alert (0)    Subjective  Subjective: Alert and participating with care  Overall Orientation Status: Within Functional Limits  Vision  Vision: Within Functional Limits  Hearing  Hearing: Within functional limits  Social/Functional History  Lives With: Alone()  Type of Home: House  Home Layout: One level  Home Access: Level entry  Bathroom Shower/Tub: Walk-in shower  Bathroom Toilet: Standard  Bathroom Equipment: Shower chair  Bathroom Accessibility: Walker accessible  Home Equipment: 4 wheeled walker, Lift chair, Alert [employer] Help From: Personal care attendant  ADL Assistance: Needs assistance  Homemaking Assistance: Needs assistance  Homemaking Responsibilities: No  Ambulation Assistance: Independent  Transfer Assistance: Independent  Active : No  Occupation: Retired  Additional Comments: sleeps in lift chair       Objective      Cognition  Overall Cognitive Status: WFL       ADL  Feeding: Minimal assistance  Grooming: Moderate assistance  UE Bathing:  Moderate assistance  LE Bathing: Dependent/Total  UE Dressing: Maximum assistance  LE Dressing: Dependent/Total  Toileting: Dependent/Total  Additional Comments: Participating in self care tasks but requiring more assist than typical    UE Function  Hand Dominance  Hand Dominance: Right        LUE Strength  Gross LUE Strength: WFL  L Hand General: 4-/5     LUE Tone: Normotonic     LUE AROM (degrees)  LUE AROM : WFL     Left Hand AROM (degrees)  Left Hand AROM: WFL  RUE Strength  R Hand General: 4-/5      RUE Tone: Normotonic     RUE AROM (degrees)  RUE AROM : WFL     Right Hand AROM (degrees)  Right Hand AROM: WFL    Fine Motor Skills  Coordination  Movements Are Fluid And Coordinated: No  Coordination and Movement description: Decreased speed, Decreased accuracy, Fine motor impairments, Gross motor impairments          LUE Edema - Circumference (cm)  LUE Edema Present?: Yes(Swelling in hands)     RUE Edema - Circumference (cm)  RUE Edema Present?: Yes(Swelling in hands)            Assessment  Performance deficits / Impairments: Decreased functional mobility , Decreased ADL status, Decreased ROM, Decreased strength, Decreased safe awareness, Decreased endurance  Treatment Diagnosis: Altered Ability to participate in care tasks  Prognosis: Fair  Decision Making: Medium Complexity  History: Moderate to extensive chart review  Exam: 6 areas of altered performance  REQUIRES OT FOLLOW UP: Yes  Discharge Recommendations: Patient would benefit from continued therapy after discharge, Continue to assess pending progress  Activity Tolerance: Patient limited by fatigue  Activity Tolerance: Fatigued with general care tasks  / commode use    Exercises  Grasp/Release:  Foam Block exercise program for hand use to decrease swelling in both upper extremities and hands    Functional Outcome Measures  AM-PAC Daily Activity Inpatient   How much help for putting on and taking off regular lower body clothing?: Total  How much help for Bathing?: A Lot  How much help for Toileting?: Total  How much help for putting on and taking off regular upper body clothing?: A Lot  How much help for taking care of personal grooming?: A Lot  How much help for eating meals?: A Little  AM-[facility] Inpatient Daily Activity Raw Score: 11  AM-PAC Inpatient ADL T-Scale Score : 29.04  ADL Inpatient CMS 0-100% Score: 70.42  ADL Inpatient CMS G-Code Modifier : CL       Goals  Patient Goals   Patient goals : Return Home  Short term goals  Time Frame for Short term goals: By discharge  Short term goal 1: Paticipate in edema reduction strategies for UEs to increase functional use of hands for self care tasks  Short term goal 2: Min A for feeding and grooming tasks using adaptive devices and techniques for care    Plan  Safety Devices  Safety Devices in place: Yes  Type of devices: Nurse notified, Patient at risk for falls, Left in bed, Call light within reach     Plan  Times per week: 2-5 sessions  Times per day: Daily  Current Treatment Recommendations: Strengthening, Functional Mobility Training, Endurance Training, Cognitive Reorientation, Pain Management, Patient/Caregiver Education & Training, Positioning  OT Education  OT Education: OT Role, Plan of Care, Home Exercise Program, Precautions, ADL Adaptive Strategies, Transfer Training    OT Equipment Recommendations  Equipment Needed: Yes  Mobility Devices: ADL Assistive Devices  ADL Assistive Devices: Reacher, Long-handled Sponge  OT Individual Minutes  Time In: 9744  Time Out: 6188  Minutes: 30    Electronically signed by Macy Skinner OT on 3/17/21 at 2:32 PM EDT

## 2021-03-17 NOTE — PLAN OF CARE
Problem: Falls - Risk of:  Goal: Will remain free from falls  Description: Will remain free from falls  3/17/2021 1714 by Eben Lu RN  Outcome: Met This Shift  Note: Pt assessed as a fall risk this shift. Remains free from falls and accidental injury at this time. Fall precautions in place, including falling star sign and fall risk band on pt. Floor free from obstacles, and bed is locked and in lowest position. Adequate lighting provided. Pt encouraged to call before getting OOB for any need. Bed alarm activated. Will continue to monitor needs during hourly rounding, and reinforce education on use of call light. 3/17/2021 0357 by Glory Whitman RN  Outcome: Met This Shift  Goal: Absence of physical injury  Description: Absence of physical injury  3/17/2021 0357 by Glory Whitman RN  Outcome: Met This Shift     Problem: Skin Integrity:  Goal: Will show no infection signs and symptoms  Description: Will show no infection signs and symptoms  3/17/2021 1714 by Eben Lu RN  Outcome: Ongoing  Note: Skin assessment complete. Waffle mattress in place. Coccyx reddened. Sensicare applied PRN. Turned and repositioned every two hours. Area kept free from moisture. Proper nourishment and fluids encouraged, as appropriate. Will continue to monitor for additional needs and changes in skin breakdown. 3/17/2021 0357 by Glory Whitman RN  Outcome: Ongoing  Goal: Absence of new skin breakdown  Description: Absence of new skin breakdown  3/17/2021 0357 by Glory Whitman RN  Outcome: Ongoing     Problem: Pain:  Goal: Pain level will decrease  Description: Pain level will decrease  3/17/2021 1714 by Eben Lu RN  Outcome: Ongoing  Note: Pt medicated with pain medication prn. Assessed all pain characteristics including level, type, location, frequency, and onset. Non-pharmacologic interventions offered to pt as well. Pt states pain is tolerable at this time. Will continue to monitor. 3/17/2021 0357 by Marianne King RN  Outcome: Ongoing  Goal: Control of acute pain  Description: Control of acute pain  3/17/2021 0357 by Marianne King RN  Outcome: Ongoing  Goal: Control of chronic pain  Description: Control of chronic pain  3/17/2021 0357 by Marianne King RN  Outcome: Ongoing     Problem: Nutrition  Goal: Optimal nutrition therapy  Description: Nutrition Problem #1: Increased nutrient needs  Intervention: Food and/or Nutrient Delivery: Continue Current Diet, Start Oral Nutrition Supplement  Nutritional Goals: po intake greater than 75%     3/17/2021 1714 by Stephanie Green RN  Outcome: Ongoing  3/17/2021 0357 by Marianne King RN  Outcome: Ongoing

## 2021-03-17 NOTE — PROGRESS NOTES
Physician Progress Note      Paula Bueno  CSN #:                  [id number]  :                       1940  ADMIT DATE:       3/15/2021 12:58 PM  [address] [place] DATE:  RESPONDING  PROVIDER #:        Jorydn Quiñonez MD          QUERY TEXT:    Pt admitted with cellulitis of legs. Pt noted to have DM. If possible, please   document in the progress notes and discharge summary the relationship, if any,   between cellulitis and DM. The medical record reflects the following:  Risk Factors: DM  Clinical Indicators: lower extremity cellulitis  Treatment: IV Vancomycin    Thank you,  Efraín Landry, RN, BSN, CCDS  [email or web address]  Options provided:  -- Bilateral lower extremity cellulitis associated with Diabetes  -- Bilateral lower extremity cellulitis unrelated to Diabetes  -- Other - I will add my own diagnosis  -- Disagree - Not applicable / Not valid  -- Disagree - Clinically unable to determine / Unknown  -- Refer to Clinical Documentation Reviewer    PROVIDER RESPONSE TEXT:    Bilateral lower extremity cellulitis unrelated to Diabetes.     Query created by: Katelynn Leung on 3/16/2021 12:01 PM      Electronically signed by:  Jordyn Quiñonez MD 3/17/2021 10:50 AM

## 2021-03-18 NOTE — PLAN OF CARE
Problem: Falls - Risk of:  Goal: Will remain free from falls  Description: Will remain free from falls  3/18/2021 1441 by Igor Salinas RN  Outcome: Met This Shift  Note: No falls noted this shift. Patient ambulates with x1 staff assistance without difficulty. Bed kept in low position. Safe environment maintained. Bedside table & call light in reach. Uses call light appropriately when needing assistance. 3/18/2021 0416 by Aleksandar Guerra RN  Outcome: Ongoing  Goal: Absence of physical injury  Description: Absence of physical injury  3/18/2021 0416 by Aleksandar Guerra RN  Outcome: Ongoing     Problem: Skin Integrity:  Goal: Will show no infection signs and symptoms  Description: Will show no infection signs and symptoms  3/18/2021 1441 by Igor Salinas RN  Outcome: Met This Shift  Note: Patient remains afebrile; WBC count is within normal limits; no signs of erythema, edema, or warmth. Will continue to monitor for signs/symptoms of infection. 3/18/2021 0416 by Aleksandar Guerra RN  Outcome: Ongoing  Goal: Absence of new skin breakdown  Description: Absence of new skin breakdown  3/18/2021 0416 by Aleksandar Guerra RN  Outcome: Ongoing     Problem: Pain:  Goal: Pain level will decrease  Description: Pain level will decrease  3/18/2021 1441 by Igor Salinas RN  Outcome: Met This Shift  Note: No pain at this time. 0/10 pain scale.    3/18/2021 0416 by Aleksandar Guerra RN  Outcome: Ongoing  Goal: Control of acute pain  Description: Control of acute pain  3/18/2021 0416 by Aleksandar Guerra RN  Outcome: Ongoing  Goal: Control of chronic pain  Description: Control of chronic pain  3/18/2021 0416 by Aleksandar Guerra RN  Outcome: Ongoing     Problem: Nutrition  Goal: Optimal nutrition therapy  Description: Nutrition Problem #1: Increased nutrient needs  Intervention: Food and/or Nutrient Delivery: Continue Current Diet, Start Oral Nutrition Supplement  Nutritional Goals: po intake greater than 75%     3/18/2021 1441 by Anne Joyce Radha Chang, RN  Outcome: Ongoing  3/18/2021 0416 by Renny Raza RN  Outcome: Ongoing

## 2021-03-18 NOTE — PROGRESS NOTES
[facility]    Physical Therapy Progress Note    Date: 3/18/21  Patient Name: Jasmyne Macdonald       Room: 6288/1727-73  MRN: [id number]   Account: [de-identified]   : 1940  (79 y.o.)   Gender: female     Discharge Recommendations   Patient would benefit from continued therapy after discharge  Equipment Needed: No    Referring Practitioner: Dr Reymundo Graves  Diagnosis: Cellulitis  Restrictions/Precautions: Fall Risk  Other position/activity restrictions: History of Lower Extremity Lymphedema now with increased swelling  and hand swelling   Past Medical History:  has a past medical history of Acute upper respiratory infection, Arthritis, Backache, Bell's palsy, Benign hypertension with CKD (chronic kidney disease) stage III, Cellulitis, Cellulitis, Cellulitis of left lower extremity, Chronic diastolic heart failure ([address] Chronic kidney disease, stage III (moderate), CKD (chronic kidney disease) stage 3, GFR 30-59 ml/min, Coronary artery disease, Cough, Diabetes ([address] Diabetes mellitus ([address] Diarrhea, H/O acute bronchitis with bronchospasm, H/O acute sinusitis, H/O allergic reaction, H/O allergic rhinitis, H/O tinea cruris, Hx of heart artery stent, Hyperlipidemia, Hypertension, Joint pain, hip, LVH (left ventricular hypertrophy), MI (myocardial infarction) ([address] Morbid obesity ([address] Otitis externa, Pulmonary hypertension ([address] Right bundle branch block, Seizure disorder ([address] Subarachnoid bleed ([address] Subarachnoid bleed ([address] and Tendonitis, bicipital.   Past Surgical History:   has a past surgical history that includes Cataract removal; Vena Cava Filter Placement; Cardiac catheterization; Kidney removal (Left); Vena Cava Filter Placement (12/9/15); Cataract removal with implant (Left, 2018); pr xcapsl ctrc rmvl insj io lens prosth w/o ecp (Left, 2018); Cataract removal with implant (Right, 2019);  Intracapsular cataract extraction (Right, 3/5/2019); and pacemaker placement. Overall Orientation Status: Impaired  Orientation Level: Oriented to place, Disoriented to time, Disoriented to situation, Oriented to person  Restrictions/Precautions  Restrictions/Precautions: Fall Risk  Position Activity Restriction  Other position/activity restrictions: History of Lower Extremity Lymphedema now with increased swelling  and hand swelling    Subjective: Pt reports \" my butt hurt this morning\"; \"no pain right now\"       Vital Signs  Patient Currently in Pain: Yes(when asked pt says 2/10, however can't report where)  Pain Assessment: 0-10  [place] Pain Rating: Hurts a little bit  Non-Pharmaceutical Pain Intervention(s): Ambulation/Increased Activity; Distraction;Repositioned  Response to Pain Intervention: Patient Satisfied                Bed Mobility:   Rolling: Minimal assistance  Supine to Sit: Moderate assistance  Sit to Supine: Unable to assess(pt left in bedside chair)  Scooting: Moderate assistance(to EOB)      Transfers:  Sit to Stand: Contact guard assistance;2 Person Assistance  Stand to sit: Contact guard assistance;2 Person Assistance  Bed to Chair: Contact guard assistance;2 Person Assistance                 Posture: Fair  Sitting - Static: Good  Sitting - Dynamic: Good;-  Standing - Static: Fair;+  Standing - Dynamic: Fair;-  Comments: Seated EOB, Standing in [place]     Other exercises?: Yes  Other exercises 1: Dangle EOB 30 min. Other exercises 2: Sit to Stand x4, in [place]  Other exercises 3: Static Standing Tolerance 2 min. 30sec, <1min x3  Other exercises 4: Standing in [employer] pre-gait weight shifting  Other exercises 5: Seated Dynamic Reaching Out of Base of Support  Other exercises 6: Seated Bil[place] LE exercises, x10-15 AROM           Activity Tolerance: Patient Tolerated treatment well;Patient limited by endurance  Activity Tolerance: Pt very pleasant and cooperative with PT/OT this date.   PT Equipment Recommendations  Equipment Needed: No

## 2021-03-18 NOTE — PLAN OF CARE
Problem: Falls - Risk of:  Goal: Will remain free from falls  Description: Will remain free from falls  3/18/2021 0416 by Patricia Ross RN  Outcome: Ongoing     Problem: Falls - Risk of:  Goal: Absence of physical injury  Description: Absence of physical injury  Outcome: Ongoing     Problem: Skin Integrity:  Goal: Will show no infection signs and symptoms  Description: Will show no infection signs and symptoms  3/18/2021 0416 by Patricia Ross RN  Outcome: Ongoing     Problem: Skin Integrity:  Goal: Absence of new skin breakdown  Description: Absence of new skin breakdown  Outcome: Ongoing     Problem: Pain:  Goal: Pain level will decrease  Description: Pain level will decrease  3/18/2021 0416 by Patricia Ross RN  Outcome: Ongoing     Problem: Pain:  Goal: Control of acute pain  Description: Control of acute pain  Outcome: Ongoing

## 2021-03-18 NOTE — PROGRESS NOTES
Pharmacy Vancomycin Consult     Vancomycin Day: 4  Current Dosinmg every 36 hours    Temp max:  97.5    Recent Labs     21  1324 21  0531   CREATININE 1.38* 1.29*     Estimated Creatinine Clearance: 40 mL/min (A) (based on SCr of 1.29 mg/dL (H)). Recent Labs     21  0600   WBC 9.4       Culture Date      Source                Results  See micro    GOAL TROUGH: 10-20    Trough: 21.1    Assessment/Plan: Trough came back slightly higher than our upper limit goal range; we will hold the dose 6 hours and begin 1,500mg Q 36 hours. [facility] in-patient pharmacy

## 2021-03-18 NOTE — PROGRESS NOTES
Physician Progress Note      Betsy Wright  CSN #:                  [id number]  :                       1940  ADMIT DATE:       3/15/2021 12:58 PM  [address] [place] DATE:  RESPONDING  PROVIDER #:        Sangita Peralta MD          QUERY TEXT:    Patient admitted with BMI 49.03. If possible, please document in the progress   notes and discharge summary if you are evaluating and /or treating any of the   following: The medical record reflects the following:  Risk Factors: 5ft 1in,  259 lbs  Clinical Indicators:   Treatment: dietary consult, carb control diet    Thank you,  Conchita Moss RN, BSN, CCDS  [email or web address]  Options provided:  -- Obesity  -- Morbid obesity  -- Overweight  -- BMI not clinically significant  -- Other - I will add my own diagnosis  -- Disagree - Not applicable / Not valid  -- Disagree - Clinically unable to determine / Unknown  -- Refer to Clinical Documentation Reviewer    PROVIDER RESPONSE TEXT:    This patient has morbid obesity.     Query created by: Rosetta Pantoja on 3/18/2021 5:06 AM      Electronically signed by:  Sangita Peralta MD 3/18/2021 8:43 AM

## 2021-03-18 NOTE — PROGRESS NOTES
Progress Note  3/18/2021 8:42 AM  Subjective:   Admit Date: 3/15/2021  PCP: Bertin Lai MD  CC: cellulitis  Interval History: Pt had a lot of pain overnight. No new issues this morning. Dressings removed. Diet: DIET CARB CONTROL; Dietary Nutrition Supplements: Clear Liquid Oral Supplement  Medications:   Scheduled Meds:   cetirizine  5 mg Oral Daily    enoxaparin  40 mg Subcutaneous Daily    insulin glulisine  25 Units Subcutaneous QAM AC    insulin glulisine  30 Units Subcutaneous Daily before lunch    insulin glulisine  20 Units Subcutaneous Dinner    Insulin Degludec  60 Units Subcutaneous Nightly    atorvastatin  10 mg Oral Daily    clopidogrel  75 mg Oral Daily    levETIRAcetam  1,000 mg Oral BID    metoprolol tartrate  12.5 mg Oral BID    potassium chloride  20 mEq Oral BID    spironolactone  25 mg Oral Daily    sodium chloride flush  10 mL Intravenous 2 times per day    magnesium oxide  400 mg Oral Daily    vancomycin (VANCOCIN) intermittent dosing (placeholder)   Other RX Placeholder    vancomycin  1,750 mg Intravenous Q36H    bumetanide  1 mg Intravenous Q12H     Continuous Infusions:   dextrose       CBC:   Recent Labs     03/16/21  0600   WBC 9.4   HGB 11.9*        BMP:    Recent Labs     03/16/21  0544 03/17/21  1324 03/18/21  0531   * 134* 139   K 5.1 4.3 4.2   CL 97* 94* 99   CO2 28 27 30   BUN 40* 38* 38*   CREATININE 1.16* 1.38* 1.29*   GLUCOSE 157* 176* 187*     Hepatic:   Recent Labs     03/15/21  1450   AST 22   ALT 13   BILITOT 0.23*   ALKPHOS 120*     Troponin: No results for input(s): TROPONINI in the last 72 hours. BNP: No results for input(s): BNP in the last 72 hours. Lipids: No results for input(s): CHOL, HDL in the last 72 hours. Invalid input(s): LDLCALCU  INR: No results for input(s): INR in the last 72 hours.     Objective:   Vitals: BP (!) 122/56   Pulse 78   Temp 97.7 °F (36.5 °C) (Oral)   Resp 18   Ht 5' 1\" (1.549 m)   Wt 259 lb 7.7 oz (117.7 kg)   SpO2 95%   BMI 49.03 kg/m²   General appearance: alert and cooperative with exam  Neck: supple, symmetrical, trachea midline  Lungs: clear to auscultation bilaterally  Heart: regular rate and rhythm, S1, S2 normal, no murmur, click, rub or gallop  Abdomen: normal findings: soft, non-tender  Extremities: swelling is down and wounds and eryth are improved with vaseline gauze and ACE wraps. Neurologic: Mental status: Alert, oriented, thought content appropriate, not as sleepy as she was the other day    Assessment and Plan:   1. Cellulitis- continue wound care with vaseline gauze/ ABD and kerlix, then ACE wraps, continue vancomycin and IV bumex  2.  DM- stable        Patient Active Problem List:     Hypoglycemia     Type 2 diabetes mellitus (HCC)     Coronary artery disease     Chronic kidney disease, stage III (moderate)     Essential hypertension     Hyperlipidemia     LVH (left ventricular hypertrophy)     Pulmonary hypertension (HCC)     Right bundle branch block     Seizure disorder (HCC)     Chronic diastolic heart failure ([address]     Fall from chair     Heart block AV third degree ([address]     Hemorrhagic cerebrovascular accident (CVA) ([address]     Mobility impaired     Coronary artery disease due to calcified coronary lesion     Stroke, hemorrhagic ([address]     Diabetic autonomic neuropathy ([address]     Hyperlipidemia     Insomnia     SAH (subarachnoid hemorrhage) ([address]     Fall from other slipping, tripping, or stumbling     Post-traumatic seizures ([address]     Traumatic brain injury ([address]     Encephalopathy acute     Type 2 diabetes mellitus, uncontrolled ([address]     Chronic diastolic CHF (congestive heart failure) ([address]     Morbid obesity with BMI of 45.0-49.9, adult ([address]     History of cerebellar hemorrhage     Ataxia     Syncope     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) ([address]     Seizure disorder ([address]     History of cerebral infarction     Vitamin D deficiency     Localized edema     CKD (chronic kidney disease) stage 3, GFR 30-59 ml/min     Benign hypertension with CKD (chronic kidney disease) stage III     Arthritis     Chest pain of uncertain etiology     Dry cough     Easy bruising     Edema of both legs     Hyperglycemia due to type 2 diabetes mellitus (HCC)     Hyperkalemia     Old myocardial infarction     Presence of drug coated stent in right coronary artery     Right bundle-branch block     Bell's palsy     Cellulitis of lower extremity     LVH (left ventricular hypertrophy)     Mild pulmonary hypertension (HCC)     Moderate mitral regurgitation     Venous insufficiency (chronic) (peripheral)     Peripheral vascular disease, unspecified (HCC)     Lymphedema, not elsewhere classified     Bullous lesion      Electronically signed by Isha Mcnair MD on 3/18/2021 at 8:42 AM

## 2021-03-18 NOTE — PROGRESS NOTES
Pharmacy Note  Vancomycin Consult    Vancomycin Day: 4  Dose = 1750 mg every 36 hours. Plan:  Trough level today at 1730 hrs. Patient's labs, cultures, vitals, and vancomycin regimen reviewed. No changes today. Nimesh Toussaint. Ph.  3/18/2021  8:34 AM

## 2021-03-19 NOTE — PROGRESS NOTES
Progress Note  3/19/2021 8:09 AM  Subjective:   Admit Date: 3/15/2021  PCP: Adele Lawrence MD  CC: cellulitis with open wounds  Interval History: pt legs wrapped. Sleepy this morning. No new issues per nursing     Diet: DIET CARB CONTROL; Dietary Nutrition Supplements: Clear Liquid Oral Supplement  Medications:   Scheduled Meds:   cetirizine  5 mg Oral Daily    vancomycin (VANCOCIN) 1500 mg in D5W 250 ml IVPB (ADDAVIAL)  1,500 mg Intravenous Q36H    enoxaparin  40 mg Subcutaneous Daily    insulin glulisine  25 Units Subcutaneous QAM AC    insulin glulisine  30 Units Subcutaneous Daily before lunch    insulin glulisine  20 Units Subcutaneous Dinner    Insulin Degludec  60 Units Subcutaneous Nightly    atorvastatin  10 mg Oral Daily    clopidogrel  75 mg Oral Daily    levETIRAcetam  1,000 mg Oral BID    metoprolol tartrate  12.5 mg Oral BID    potassium chloride  20 mEq Oral BID    spironolactone  25 mg Oral Daily    sodium chloride flush  10 mL Intravenous 2 times per day    magnesium oxide  400 mg Oral Daily    vancomycin (VANCOCIN) intermittent dosing (placeholder)   Other RX Placeholder    bumetanide  1 mg Intravenous Q12H     Continuous Infusions:   dextrose       CBC: No results for input(s): WBC, HGB, PLT in the last 72 hours. BMP:    Recent Labs     03/17/21  1324 03/18/21  0531 03/19/21  0505   * 139 136   K 4.3 4.2 3.9   CL 94* 99 96*   CO2 27 30 29   BUN 38* 38* 40*   CREATININE 1.38* 1.29* 1.35*   GLUCOSE 176* 187* 173*     Hepatic: No results for input(s): AST, ALT, ALB, BILITOT, ALKPHOS in the last 72 hours. Troponin: No results for input(s): TROPONINI in the last 72 hours. BNP: No results for input(s): BNP in the last 72 hours. Lipids: No results for input(s): CHOL, HDL in the last 72 hours. Invalid input(s): LDLCALCU  INR: No results for input(s): INR in the last 72 hours.     Objective:   Vitals: BP (!) 110/57   Pulse 82   Temp 97.5 °F (36.4 °C) (Oral)   Resp 18   Ht 5' 1\" (1.549 m)   Wt 259 lb 7.7 oz (117.7 kg)   SpO2 98%   BMI 49.03 kg/m²   General appearance: alert and cooperative with exam  Neck: supple, symmetrical, trachea midline  Lungs: clear to auscultation bilaterally  Heart: regular rate and rhythm, S1, S2 normal, no murmur, click, rub or gallop  Abdomen: normal findings: soft, non-tender  Extremities: leg wrapped bilaterally- did not unwrap this morning  Neurologic: Mental status: Alert, oriented, thought content appropriate, some groggy this morning    Assessment and Plan:   1. Cellulitis with wounds and edema- continue vancomycin and IV bumex. Will plan on d/c to [facility] for therapy and continued IV abx. Will change to po bumex tomorrow depending on swelling. 2. DM- continue home insulin only- apidra- pt allergic to all others.       Patient Active Problem List:     Hypoglycemia     Type 2 diabetes mellitus (HCC)     Coronary artery disease     Chronic kidney disease, stage III (moderate)     Essential hypertension     Hyperlipidemia     LVH (left ventricular hypertrophy)     Pulmonary hypertension (HCC)     Right bundle branch block     Seizure disorder (HCC)     Chronic diastolic heart failure ([address]     Fall from chair     Heart block AV third degree ([address]     Hemorrhagic cerebrovascular accident (CVA) ([address]     Mobility impaired     Coronary artery disease due to calcified coronary lesion     Stroke, hemorrhagic ([address]     Diabetic autonomic neuropathy ([address]     Hyperlipidemia     Insomnia     SAH (subarachnoid hemorrhage) ([address]     Fall from other slipping, tripping, or stumbling     Post-traumatic seizures ([address]     Traumatic brain injury ([address]     Encephalopathy acute     Type 2 diabetes mellitus, uncontrolled ([address]     Chronic diastolic CHF (congestive heart failure) ([address]     Morbid obesity with BMI of 45.0-49.9, adult ([address]     History of cerebellar hemorrhage     Ataxia     Syncope     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) [facility])     Seizure disorder ([facility]     History of cerebral infarction     Vitamin D deficiency     Localized edema     CKD (chronic kidney disease) stage 3, GFR 30-59 ml/min     Benign hypertension with CKD (chronic kidney disease) stage III     Arthritis     Chest pain of uncertain etiology     Dry cough     Easy bruising     Edema of both legs     Hyperglycemia due to type 2 diabetes mellitus (HCC)     Hyperkalemia     Old myocardial infarction     Presence of drug coated stent in right coronary artery     Right bundle-branch block     Bell's palsy     Cellulitis of lower extremity     LVH (left ventricular hypertrophy)     Mild pulmonary hypertension (HCC)     Moderate mitral regurgitation     Venous insufficiency (chronic) (peripheral)     Peripheral vascular disease, unspecified (HCC)     Lymphedema, not elsewhere classified     Bullous lesion      Electronically signed by Elodia Lan MD on 3/19/2021 at 8:09 AM

## 2021-03-19 NOTE — PROGRESS NOTES
[address]   INPATIENT OCCUPATIONAL THERAPY  PROGRESS NOTE  Date: 3/19/2021  Patient Name: Antonia Gabriel      Room: 0612/4160-58  MRN: [id number]    : 1940  (79 y.o.) Gender: female     Discharge Recommendations:  Further Occupational Therapy is recommended upon facility discharge. OT Equipment Recommendations  Equipment Needed: Yes  Mobility Devices: ADL Assistive Devices  ADL Assistive Devices: Memo Money    Referring Practitioner: Dr Jessica Haynes  Diagnosis: Cellulitis  General  Chart Reviewed: Yes, Progress Notes  Patient assessed for rehabilitation services?: Yes  Additional Pertinent Hx: Lower Extremity lymphedema  Response to previous treatment: Patient with no complaints from previous session  Family / Caregiver Present: Yes  Referring Practitioner: Dr Jessica Haynes  Diagnosis: Cellulitis    Restrictions  Restrictions/Precautions: Fall Risk  Other position/activity restrictions: History of Lower Extremity Lymphedema now with increased swelling  and hand swelling      Subjective  Subjective: \"I'm old, you can't expect me to do stuffa teenage would do\". Pt states when prompted for OOB activity. Comments: Pt demos with confusion and is emotional throughout tx. Co-tx with SHARRI Taylor. Patient Currently in Pain: Yes  Pain Location: Generalized  Pain Orientation: (\"all over\" pt states)  Overall Orientation Status: Impaired  Orientation Level: Oriented to person;Disoriented to place; Disoriented to time;Disoriented to situation  Patient Observation  Observations: BLE wrapped with ACE bandage foot>knee  Pain Assessment  Pain Assessment: Faces  [place]-Baker Pain Rating: Hurts worst  Pain Type: Acute pain  Pain Location: Generalized  Pain Orientation: (\"all over\" pt states)    Objective  Cognition  Overall Cognitive Status: Impaired  Memory: Decreased recall of recent events  Insights: Decreased awareness of deficits  Bed mobility  Supine to Sit: 2 Person assistance;Maximum assistance  Comment: mod A X2 for supine to sit EOB with increased time req 2* fatigue  Balance  Sitting Balance: Stand by assistance  Standing Balance: Dependent/Total(use of SS for standing. )  Standing Balance  Time: <1 min X3  Activity: Stand on SS for functional transfer  Comment: VC for optimal posutre on SS. ADL  Feeding: Minimal assistance  Grooming: Setup; Increased time to complete;Stand by assistance  UE Bathing: Minimal assistance  LE Bathing: Maximum assistance  UE Dressing: Maximum assistance  LE Dressing: Dependent/Total  Toileting: Dependent/Total  Additional Comments: HUGGINS facilitates pt in toileitng using SS for transfer to [facility]. Pt reqiures TA for all aspects and VC for siting upright on BSC (not leaning onto SS). Pt limited by fatigue, answers based on skilled observation/clinical reasoning unless otherwise noted     Transfers  Sit to stand: 2 Person assistance(mod X2 from EOB, mod X3 from BSC, mod X2 from SS )  Transfer Comments: VCs for hand placement. Toilet Transfers  Equipment Used: Extra wide bedside commode  Toilet Transfer: Dependent/Total  Toilet Transfers Comments: Pt sitting EOB and requests to use BSC despite increased fatigue. Requires 2-3 assist for use of SS for transferring on/off BSC. Placed in bedside chair at pt's request  end end of session. Type of ROM/Therapeutic Exercise  Type of ROM/Therapeutic Exercise: AAROM  Comment: Pt facilitated in BUE finger flexion/extension for facilitation of decreased edema and increased ROM and functionality. Pt demos with grimacing and reports increased pain with movement. Completes 10 reps X2 sets with RB req due to generalized pain and fatigue. Exercises  Finger Flexion: x  Finger Extension: x                  Positioning  Adaptations: Pt placed in bedside recliner chair at end of session despite encouragement for returning to bed to assist with decreased edema in BLEs.  Pt positioned with BUE/BLE bolstering and reclined as much as tolerable for opitmal fluid reduction. Assessment  Performance deficits / Impairments: Decreased functional mobility ; Decreased ADL status; Decreased ROM; Decreased strength;Decreased safe awareness;Decreased endurance  Assessment: Poor activity tolerance 2* increased generalized pain and fatigue  Prognosis: Fair  Discharge Recommendations: Patient would benefit from continued therapy after discharge;Continue to assess pending progress  Activity Tolerance: Patient limited by fatigue  Activity Tolerance: Fatigued with general care tasks  / commode use  Safety Devices in place: Yes  Type of devices: Left in chair;Call light within reach;Nurse notified             Patient Education: BUE positioning, OT POC, safety with use of SS and functional transfers, importance of daily participation for optimal rehab     Learner:patient  Method: explanation       Outcome: needs reinforcement     Plan  Safety Devices  Safety Devices in place: Yes  Type of devices: Left in chair, Call light within reach, Nurse notified  Plan  Times per week: 2-5 sessions  Times per day: Daily  Current Treatment Recommendations: Strengthening, Functional Mobility Training, Endurance Training, Cognitive Reorientation, Pain Management, Patient/Caregiver Education & Training, Positioning      Goals  Short term goals  Time Frame for Short term goals: By discharge  Short term goal 1: Paticipate in edema reduction strategies for UEs to increase functional use of hands for self care tasks  Short term goal 2: Min A for feeding and grooming tasks using adaptive devices and techniques for care    OT Individual Minutes  Time In: 2685  Time Out: [address]  Minutes: 60      Electronically signed by AVIVA Mcintyre on 3/19/21 at 3:54 PM EDT

## 2021-03-19 NOTE — PLAN OF CARE
Problem: Falls - Risk of:  Goal: Will remain free from falls  Outcome: Ongoing  Goal: Absence of physical injury  Outcome: Ongoing     Problem: Skin Integrity:  Goal: Will show no infection signs and symptoms  Outcome: Ongoing  Goal: Absence of new skin breakdown  Outcome: Ongoing     Problem: Pain:  Goal: Pain level will decrease  Outcome: Ongoing  Goal: Control of acute pain  Outcome: Ongoing  Goal: Control of chronic pain  Outcome: Ongoing     Problem: Nutrition  Goal: Optimal nutrition therapy  Outcome: Ongoing

## 2021-03-19 NOTE — PROGRESS NOTES
[address]   Physical Therapy Progress Note    Date: 3/19/21  Patient Name: Colton Reagan       Room: 3850/7926-73  MRN: [id number]   Account: [de-identified]   : 1940  (79 y.o.)   Gender: female     Discharge Recommendations   Patient would benefit from continued therapy after discharge  Equipment Needed: No    Referring Practitioner: Dr Raleigh Ch  Diagnosis: Cellulitis  Restrictions/Precautions: Fall Risk  Other position/activity restrictions: History of Lower Extremity Lymphedema now with increased swelling  and hand swelling   Past Medical History:  has a past medical history of Acute upper respiratory infection, Arthritis, Backache, Bell's palsy, Benign hypertension with CKD (chronic kidney disease) stage III, Cellulitis, Cellulitis, Cellulitis of left lower extremity, Chronic diastolic heart failure ([address] Chronic kidney disease, stage III (moderate), CKD (chronic kidney disease) stage 3, GFR 30-59 ml/min, Coronary artery disease, Cough, Diabetes ([address] Diabetes mellitus ([address] Diarrhea, H/O acute bronchitis with bronchospasm, H/O acute sinusitis, H/O allergic reaction, H/O allergic rhinitis, H/O tinea cruris, Hx of heart artery stent, Hyperlipidemia, Hypertension, Joint pain, hip, LVH (left ventricular hypertrophy), MI (myocardial infarction) ([address] Morbid obesity ([address] Otitis externa, Pulmonary hypertension ([address] Right bundle branch block, Seizure disorder ([address] Subarachnoid bleed ([address] Subarachnoid bleed ([address] and Tendonitis, bicipital.   Past Surgical History:   has a past surgical history that includes Cataract removal; Vena Cava Filter Placement; Cardiac catheterization; Kidney removal (Left); Vena Cava Filter Placement (12/9/15); Cataract removal with implant (Left, 2018); pr xcapsl ctrc rmvl insj io lens prosth w/o ecp (Left, 2018); Cataract removal with implant (Right, 2019);  Intracapsular cataract extraction (Right, 3/5/2019); and pacemaker placement. Overall Orientation Status: Impaired  Orientation Level: Disoriented to time, Disoriented to situation, Oriented to person, Disoriented to place  Restrictions/Precautions  Restrictions/Precautions: Fall Risk  Position Activity Restriction  Other position/activity restrictions: History of Lower Extremity Lymphedema now with increased swelling  and hand swelling    Subjective: Pt reports hurting right now and unsure what \"everyone is expecting me to do\"  Comments: Pt is very lethargic and confused this afternoon. Pt is crying throughout tx and needing more help than prior day. Co-treat with Pedro Holguin. TITUS Álvarez reports pt had slept in chair all night and all day. Pt is refusing to go back into bed due to chronic back pain. Pt is refusing pain medication. Pt is not compliant with elevating bilat LE and bilat UE to reduce the inflammation and fluid    Vital Signs  Patient Currently in Pain: Yes(when asked pt says 2/10, however can't report where)  [place] Pain Rating: Hurts whole lot  Non-Pharmaceutical Pain Intervention(s): Ambulation/Increased Activity; Distraction;Repositioned  Response to Pain Intervention: Patient Satisfied                  Bed Mobility  Rolling: Minimal assistance  Supine to Sit: Maximal assistance(x2)  Sit to Supine: Unable to assess(pt left in bedside chair)  Scooting: Maximal assistance;2 Person assistance(to EOB)      Transfers:  Sit to Stand: Moderate Assistance(x3)  Stand to sit: Moderate Assistance(x3)                Posture: Fair  Sitting - Static: Fair(heavily leaning right )  Sitting - Dynamic: Fair;-(needing bilat UE support)  Standing - Static: Fair  Standing - Dynamic: Fair;-  Comments: Seated EOB, Standing in [employer] Crease     Other exercises?: Yes  Other exercises 1: Dangle EOB 30 min.   Other exercises 2: Sit to Stand x3, in [place]ase  Other exercises 3: Transfer from bed to bedside commode to bedside recliner  Other exercises 4: Supine bilat LE A/AAROM Activity Tolerance: Patient Tolerated treatment well;Patient limited by endurance  Activity Tolerance: Max Ax2 - Mod Ax3 pt is more lethargic, pt crying and complaining of pain throughout tx, extra time for pt to move due to pain. PT Equipment Recommendations  Equipment Needed: No       Assessment  Activity Tolerance: Patient Tolerated treatment well;Patient limited by endurance   Body structures, Functions, Activity limitations: Decreased functional mobility ; Decreased strength;Decreased endurance  Assessment: Impaired mobility due to decreased strength and  endurance  Discharge Recommendations: Patient would benefit from continued therapy after discharge     Type of devices: Patient at risk for falls;Call light within reach; Chair alarm in place; Left in chair;Nurse notified;Gait belt(TITUS Perez updated)     Plan  Times per week: 5x/wk  Current Treatment Recommendations: Strengthening, Functional Mobility Training, Transfer Training, Gait Training    Patient Education  New Education Provided:  Plan of Care  Learner:patient  Method: demonstration and explanation       Outcome: needs reinforcement     Goals  Short term goals  Time Frame for Short term goals: 3-4 visits  Short term goal 1: standing from raised bed with SBA  Short term goal 2: ambulate with Rollator x 20-25ft  Short term goal 3: pt able to tolerate 15-20 min of seated activity/exercise    PT Individual Minutes  Time In: 5742  Time Out: [address] [facility]  Minutes: 60    Electronically signed by Clarisa Zuniga PTA on 3/19/21 at 4:08 PM EDT

## 2021-03-20 NOTE — DISCHARGE INSTR - COC
E11.65    Hyperkalemia E87.5    Old myocardial infarction I25.2    Presence of drug coated stent in right coronary artery Z95.5    Right bundle-branch block I45.10    Bell's palsy G51.0    Cellulitis of lower extremity L03.119    LVH (left ventricular hypertrophy) I51.7    Mild pulmonary hypertension (HCC) I27.20    Moderate mitral regurgitation I34.0    Venous insufficiency (chronic) (peripheral) I87.2    Peripheral vascular disease, unspecified (HCC) I73.9    Lymphedema, not elsewhere classified I89.0    Bullous lesion R23.8       Isolation/Infection:   Isolation            No Isolation          Patient Infection Status       Infection Onset Added Last Indicated Last Indicated By Review Planned Expiration Resolved Resolved By    None active    Resolved    COVID-19 Rule Out 11/10/20 11/10/20 11/10/20 COVID-19 Ambulatory (Ordered)   11/12/20 Rule-Out Test Resulted            Nurse Assessment:  Last Vital Signs: BP (!) 116/53   Pulse 69   Temp 97.5 °F (36.4 °C)   Resp 16   Ht 5' 1\" (1.549 m)   Wt 259 lb 7.7 oz (117.7 kg)   SpO2 96%   BMI 49.03 kg/m²     Last documented pain score (0-10 scale): Pain Level: 10  Last Weight:   Wt Readings from Last 1 Encounters:   03/17/21 259 lb 7.7 oz (117.7 kg)     Mental Status:  oriented and alert    IV Access:  - Peripheral IV - site  {Anatomy; iv placement site:[id number]}, insertion date: ***    Nursing Mobility/ADLs:  Walking   Assisted  Transfer  Assisted  Bathing  Assisted  Dressing  Assisted  Toileting  Assisted  Feeding  Independent  Med Admin  Assisted  Med Delivery   whole    Wound Care Documentation and Therapy:  Incision 12/09/15 Groin Right (Active)   Number of days: 1927       Incision 11/01/18 Eye Left (Active)   Number of days: 869       Wound 08/11/20 Leg Left; Lower; Anterior Open area that is blanchable. Patient stated it was a blister that popped. (Active)   Dressing Status Clean;Dry; Intact 03/19/21 8727   Wound Cleansed Soap and water 03/19/21 6391

## 2021-03-20 NOTE — PLAN OF CARE
Problem: Falls - Risk of:  Goal: Will remain free from falls  Description: Will remain free from falls  3/20/2021 1403 by Tereza Yañez RN  Outcome: Completed  3/20/2021 0622 by Ragini Gutierrez RN  Outcome: Ongoing  Goal: Absence of physical injury  Description: Absence of physical injury  3/20/2021 1403 by Tereza Yañez RN  Outcome: Completed  3/20/2021 0622 by Ragini Gutierrez RN  Outcome: Ongoing     Problem: Skin Integrity:  Goal: Will show no infection signs and symptoms  Description: Will show no infection signs and symptoms  3/20/2021 1403 by Tereza Yañez RN  Outcome: Completed  3/20/2021 0622 by Ragini Gutierrez RN  Outcome: Ongoing  Goal: Absence of new skin breakdown  Description: Absence of new skin breakdown  3/20/2021 1403 by Tereza Yañez RN  Outcome: Completed  3/20/2021 0622 by Ragini Gutierrez RN  Outcome: Ongoing     Problem: Pain:  Goal: Pain level will decrease  Description: Pain level will decrease  3/20/2021 1403 by Tereza Yañez RN  Outcome: Completed  3/20/2021 0622 by Ragini Gutierrez RN  Outcome: Ongoing  Goal: Control of acute pain  Description: Control of acute pain  3/20/2021 1403 by Tereza Yañez RN  Outcome: Completed  3/20/2021 0622 by Ragini Gutierrez RN  Outcome: Ongoing  Goal: Control of chronic pain  Description: Control of chronic pain  3/20/2021 1403 by Tereza Yañez RN  Outcome: Completed  3/20/2021 0622 by Ragini Gutierrez RN  Outcome: Ongoing     Problem: Nutrition  Goal: Optimal nutrition therapy  Description: Nutrition Problem #1: Increased nutrient needs  Intervention: Food and/or Nutrient Delivery: Continue Current Diet, Start Oral Nutrition Supplement  Nutritional Goals: po intake greater than 75%     3/20/2021 1403 by Tereza Yañez RN  Outcome: Completed  3/20/2021 0622 by Ragini Gutierrez RN  Outcome: Ongoing

## 2021-03-20 NOTE — CARE COORDINATION
Social work; spoke to Chris Mcclain at [facility] and ok to send pt with peripheral IV and if some does of Vanco are still needed just list on bruno. Will need to fax prior to discharge at 11 am so they can get meds from their pharmacy. Dora rust    Called son to advise and confirm time of transfer. He requested an update transferred to RN phone. Dora rust    Pt is still going to need vanco for 2 more weeks. Informed snf of same information. Dora rust    [id number] Leigh Edwards with [facility] again about the 2 weeks of antibiotics with perpheral line. Dora rust    Faxed bruno and covid test to snf.  Dora rust

## 2021-03-21 NOTE — DISCHARGE SUMMARY
[facility]   Discharge Summary      Patient ID: Lilli Paige    MRN: [id number]     Acct:  [de-identified]       Patient's PCP: Hao Epstein MD    Admit Date: 3/15/2021     Discharge Date: 3/20/2021      Admitting Physician: Hao Epstein MD    Discharge Physician: Malika Trevino MD     Discharge Diagnoses:    Primary Problem  Cellulitis of lower extremity  Cellulitis of lower extremity    Active Hospital Problems    Diagnosis Date Noted    Cellulitis of lower extremity [L03.119] 08/11/2020     Past Medical History:   Diagnosis Date    Acute upper respiratory infection     Arthritis 6/23/2017    Backache     Bell's palsy     Benign hypertension with CKD (chronic kidney disease) stage III 7/15/2020    Cellulitis     Cellulitis 8/11/2020    Cellulitis of left lower extremity 7/26/2019    Chronic diastolic heart failure (HCC)     Chronic kidney disease, stage III (moderate)     CKD (chronic kidney disease) stage 3, GFR 30-59 ml/min 6/15/2020    Coronary artery disease     Cough     Diabetes ([address]     Diabetes mellitus ([address]     Diarrhea 12/8/2015    H/O acute bronchitis with bronchospasm     H/O acute sinusitis     H/O allergic reaction     H/O allergic rhinitis     H/O tinea cruris     Hx of heart artery stent     heart stents x 4    Hyperlipidemia     Hypertension     Joint pain, hip     LVH (left ventricular hypertrophy)     MI (myocardial infarction) ([address]     Morbid obesity ([address]     Otitis externa     Pulmonary hypertension ([address]     Right bundle branch block     Seizure disorder ([address]     Late effects of CVA    Subarachnoid bleed ([address]     Subarachnoid bleed ([address] march 28, 2016    Tendonitis, bicipital      The patient was seen and examined on day of discharge and this discharge summary is in conjunction with any daily progress note from day of discharge. Code Status:  Prior    Hospital Course: Patient mated after failing outpatient oral antibiotics. Patient had improvement with IV antibiotics    Consults:  none    Significant Diagnostic Studies: as above, and as follows: BUN 47 creatinine 1.35 sugars were 128 126 269    Treatments: as above    Disposition: SNF    Discharged Condition: Stable    Follow Up:   Kassie Correa MD in two weeks    Discharge Medications:   IV antibiotics for 2 weeks   Surekha Elizondo   Home Medication Instructions [id number]    Printed on:03/21/21 125   Medication Information                      acetaminophen (TYLENOL) 325 MG tablet  Take 2 tablets by mouth every 6 hours as needed for Pain             albuterol sulfate HFA (PROVENTIL HFA) 108 (90 Base) MCG/ACT inhaler  Inhale 2 puffs into the lungs every 6 hours as needed for Shortness of Breath (cough)             atorvastatin (LIPITOR) 10 MG tablet  Take 1 tablet by mouth daily             bumetanide (BUMEX) 2 MG tablet  Take 1 tablet by mouth daily             calcium carbonate (OYSTER SHELL CALCIUM 500 MG) 1250 (500 Ca) MG tablet  TAKE 1 TABLET BY MOUTH ONE TIME A DAY              clopidogrel (PLAVIX) 75 MG tablet  Take 1 tablet by mouth daily             dextrose 5 % SOLN 250 mL with vancomycin 1.5 g SOLR 1,500 mg  Infuse 1,500 mg intravenously every 36 hours             Ginger, Zingiber officinalis, (GINGER PO)  Take by mouth             hydrOXYzine (ATARAX) 25 MG tablet  Take 1 tablet by mouth every 4 hours as needed for Itching             insulin glulisine (APIDRA SOLOSTAR) 100 UNIT/ML injection pen  Take 25 units with BF, 30 with lunch and 20 with supper             levETIRAcetam (KEPPRA) 1000 MG tablet  TAKE 1 TABLET TWICE A DAY             loperamide (IMODIUM) 2 MG capsule  Take 1 capsule by mouth as needed for Diarrhea             Magnesium 400 MG TABS  Take 400 mg/day by mouth daily             magnesium oxide (MAG-OX) 400 MG tablet  TAKE 1 TABLET BY MOUTH EVERY DAY             metoprolol tartrate (LOPRESSOR) 25 MG tablet  Take 0.5 tablets by mouth 2 times daily Indications: per Cardiologist's order             Misc Natural Products (APPLE CIDER VINEGAR) TABS  Take by mouth             Multiple Vitamins-Minerals (MULTIVITAMIN WOMEN PO)  Take by mouth             potassium chloride (KLOR-CON) 20 MEQ packet  Take 20 mEq by mouth 2 times daily             sodium chloride (ALTAMIST SPRAY) 0.65 % nasal spray  1 spray by Nasal route 2 times daily             spironolactone (ALDACTONE) 25 MG tablet  TAKE 1 TABLET DAILY             TRESIBA FLEXTOUCH 100 UNIT/ML SOPN  Inject 60 Units into the skin nightly Patient is taking 60 units nightly             vitamin D (ERGOCALCIFEROL) 20147 UNITS CAPS capsule  TAKE 1 CAPSULE BY MOUTH ONE TIME A WEEK                   Activity: activity as tolerated    Diet: regular diet    Time Spent on discharge is more than 15 minutes in the examination, evaluation, counseling and review of medications and discharge plan. Electronically signed by Lázaro Power MD on 3/21/2021 at 12:53 PM     Thank you Dr. Adele Lawrence MD for the opportunity to be involved in this patient's care.

## 2021-04-01 NOTE — TELEPHONE ENCOUNTER
Pt's daughter Danisha mustafa. States that they never heard anything about her bx from Feb. Please advise.

## 2021-04-12 NOTE — CARE COORDINATION
called [facility] and confirmed patient is still at the facility but has moved to the assisted livin unit. Requested an updated medication list be faxed to [facility].

## 2021-04-12 NOTE — CARE COORDINATION
[facility] Transitions Initial Follow Up Call    Call within 2 business days of discharge: Yes    Patient: Trudi Valencia Patient : 1940   MRN: <[id number]>  Reason for Admission: Cellulitis lower extremity  Discharge Date: 3/20/21 RARS: Readmission Risk Score: 22      Last Discharge [facility]       Complaint Diagnosis Description Type Department Provider    3/15/21  Uncontrolled type 2 diabetes mellitus with hyperglycemia ([address] Admission (Discharged) 1 Shanelle Brink MD      Acute Care Course: Patient admitted to [facility] for Cellulitis after failed oral abx with weeping edema from 3/15-3/20. She discharged to [facility] for rehab until discharge to assisted living on  [address]. Sig Hx: Itching, DM2, Weakness    DME:      Message to  to contact Natasha España for location of assisted living and obtain a updated med list faxed to secure number. Follow up plan: Will follow up.                 Non-face-to-face services provided:      Care Transitions 24 Hour Call    Do you have all of your prescriptions and are they filled?: Yes (Comment: medications organized by son every week)  Patient DME: Renny Triplett  Patient Home Equipment: Nebulizer  Do you have support at home?: Alone  Are you an active caregiver in your home?: No  Care Transitions Interventions         Follow Up  Future Appointments   Date Time Provider Jenni Fierro   2021 10:30 AM FELICE Sanders - CNP [facility]   2021  1:00 PM MD LESLIE Luke RN

## 2021-04-13 NOTE — PROGRESS NOTES
[address] [address] PRIMARY CARE  [facility] [address]  [facility] [address] [place] [address]  Dept: [phone]    Marilyn Good is a 79 y.o. female Established patient, who presents today for her medical conditions/complaints as noted below. Chief Complaint   Patient presents with    Edema     bilat foot/ankle edema    Other     pt is very tearful due to lack of interaction with others here - she states she isn't included in things now that she is unable to walk. HPI:     HPI    Reviewed prior notes None  Reviewed previous skilled stay records, Labs and Hospital Records   Resident moved from skilled nursing to assisted living last Friday. She has home health services while in assisted living. Bilateral lower legs increased redness, increase edema, and are not wrapped. Open blisters bilateral toes.   She is tearful  Incontinence brief is saturated    LDL Cholesterol (mg/dL)   Date Value   06/24/2016 72   12/01/2015 68     LDL Calculated (mg/dL)   Date Value   01/26/2021 61   03/11/2019 88   07/06/2017 76       (goal LDL is <100)   AST (U/L)   Date Value   03/15/2021 22     ALT (U/L)   Date Value   03/15/2021 13     BUN (mg/dL)   Date Value   03/20/2021 47 (H)     Hemoglobin A1C (%)   Date Value   01/26/2021 6.3     TSH (mIU/L)   Date Value   12/02/2020 12.01 (H)     BP Readings from Last 3 Encounters:   04/13/21 110/62   03/20/21 (!) 116/53   03/15/21 132/76          (goal 120/80)    Past Medical History:   Diagnosis Date    Acute upper respiratory infection     Arthritis 6/23/2017    Backache     Bell's palsy     Benign hypertension with CKD (chronic kidney disease) stage III 7/15/2020    Cellulitis     Cellulitis 8/11/2020    Cellulitis of left lower extremity 7/26/2019    Chronic diastolic heart failure (HCC)     Chronic kidney disease, stage III (moderate)     CKD (chronic kidney disease) stage 3, GFR 30-59 ml/min 6/15/2020    Coronary artery disease     Cough     mg/day by mouth daily (Patient not taking: Reported on 4/13/2021) 90 tablet 3    Ginger, Zingiber officinalis, (GINGER PO) Take by mouth      Multiple Vitamins-Minerals (MULTIVITAMIN WOMEN PO) Take by mouth      albuterol sulfate HFA (PROVENTIL HFA) 108 (90 Base) MCG/ACT inhaler Inhale 2 puffs into the lungs every 6 hours as needed for Shortness of Breath (cough) (Patient not taking: Reported on 4/13/2021) 1 Inhaler 3     No current facility-administered medications for this visit. Allergies   Allergen Reactions    Lantus [Insulin Glargine]      Hives,itching    Metformin And Related Diarrhea    Prednisone Swelling     Facial swelling    Sertraline Other (See Comments)     hallucination    Amoxicillin Diarrhea and Nausea And Vomiting    Bactrim [Sulfamethoxazole-Trimethoprim] Rash     Rash on arms    Eggs Or Egg-Derived Products Rash    Novolog [Insulin Aspart] Itching and Rash    Zinc Itching and Rash       Health Maintenance   Topic Date Due    Shingles Vaccine (1 of 2) Never done    DEXA (modify frequency per FRAX score)  Never done    DTaP/Tdap/Td vaccine (2 - Td) 09/14/2010    Annual Wellness Visit (AWV)  Never done    Flu vaccine (Season Ended) 01/05/2022 (Originally 9/1/2021)    Lipid screen  01/26/2022    Potassium monitoring  03/20/2022    Creatinine monitoring  03/20/2022    Pneumococcal 65+ years Vaccine  Completed    COVID-19 Vaccine  Completed    Hepatitis A vaccine  Aged Out    Hib vaccine  Aged Out    Meningococcal (ACWY) vaccine  Aged Out       Subjective:      Review of Systems   Constitutional: Positive for activity change and fatigue. Negative for chills and fever. HENT: Negative for congestion, nosebleeds and rhinorrhea. Eyes: Negative for pain and redness. Respiratory: Negative for cough and shortness of breath. Cardiovascular: Positive for leg swelling. Negative for chest pain. Gastrointestinal: Positive for nausea.  Negative for constipation and diarrhea. Genitourinary: Negative for difficulty urinating and dysuria. Musculoskeletal: Positive for arthralgias and gait problem. Skin: Positive for color change and wound. Negative for rash. Open blisters   Neurological: Positive for weakness. Negative for headaches. Psychiatric/Behavioral: Positive for dysphoric mood. Negative for sleep disturbance. The patient is nervous/anxious. Objective:     /62   Pulse 95   Temp 97.1 °F (36.2 °C)   SpO2 95%   Physical Exam  Vitals signs and nursing note reviewed. Constitutional:       Appearance: She is obese. She is ill-appearing. HENT:      Head: Normocephalic and atraumatic. Right Ear: External ear normal.      Left Ear: External ear normal.   Eyes:      Extraocular Movements: Extraocular movements intact. Pupils: Pupils are equal, round, and reactive to light. Neck:      Musculoskeletal: Normal range of motion and neck supple. Cardiovascular:      Rate and Rhythm: Normal rate and regular rhythm. Heart sounds: Normal heart sounds. No murmur. Comments: Bilateral lower extremity weeping edema  Pulmonary:      Breath sounds: Normal breath sounds. Abdominal:      General: Bowel sounds are normal.      Palpations: Abdomen is soft. Musculoskeletal:      Right lower leg: Edema present. Left lower leg: Edema present. Skin:     Findings: Erythema present. Comments: Multiple open blister areas on bilateral feet. Left had blister dried. Bilateral lower legs cracked and extremely red. Neurological:      Mental Status: She is alert and oriented to person, place, and time. Motor: Weakness present. Gait: Gait abnormal.   Psychiatric:         Mood and Affect: Mood is anxious and depressed. Affect is tearful. Speech: Speech normal.         Behavior: Behavior is cooperative. Cognition and Memory: Cognition is impaired. Assessment:       Diagnosis Orders   1.  Chronic diastolic heart failure (HCC)     2. Bullous pemphigoid     3. Cellulitis of lower extremity, unspecified laterality     4. Lymphedema     5. Depression, unspecified depression type  DULoxetine (CYMBALTA) 60 MG extended release capsule        Plan:    1. CBC & BMP  2. Vancomycin IV through PICC line X 7 days - Pharmacy to dose  3. Continue skin prep to bilateral heels  4. Apply povidone - iodine to open blistered areas on bilateral feet and toes  5. Cleanse bilat. Legs and feet with soap and water and apply lac-hydrin at HS  6. Cleanse and dry legs and feet and apply Kerlix guaze and ace wrap from toes to knees bilat. On in AM and off at HS  7. Ambulate to and from dining room with assistance 3 meals per day  8. Assist with shower. 9. Increase duloxetine to 60 mg PO QD  10 UA with reflex to culture  11. Psych eval    Return in about 1 week (around 4/20/2021) for cellulitis. No orders of the defined types were placed in this encounter. Orders Placed This Encounter   Medications    DULoxetine (CYMBALTA) 60 MG extended release capsule     Sig: Take 1 capsule by mouth daily     Dispense:  30 capsule     Refill:  0       Patient given educational materials - see patient instructions. Discussed use, benefit, and side effects of prescribed medications. All patient questions answered. Pt voiced understanding. Reviewed health maintenance. Instructed to continue current medications, diet and exercise. Patient agreed with treatment plan. Follow up as directed.      Electronically signed by FELICE Sharma CNP on 4/13/2021 at 11:28 AM

## 2021-04-13 NOTE — PATIENT INSTRUCTIONS
1. CBC & BMP  2. Vancomycin IV through PICC line X 7 days - Pharmacy to dose  3. Continue skin prep to bilateral heels  4. Apply povidone - iodine to open blistered areas on bilateral feet and toes  5. Cleanse bilat. Legs and feet with soap and water and apply lac-hydrin at HS  6. Cleanse and dry legs and feet and apply Kerlix guaze and ace wrap from toes to knees bilat. On in AM and off at HS  7. Ambulate to and from dining room with assistance 3 meals per day  8. Assist with shower.   9. Increase duloxetine to 60 mg PO QD

## 2021-04-14 NOTE — TELEPHONE ENCOUNTER
Pt's CHRIST Cuellar called backing regarding derm bx results, he was notified of results and stated will discuss with pt and go from there since pt is at [facility]

## 2021-05-03 NOTE — TELEPHONE ENCOUNTER
[facility] calling. Pt's K+ was 5.4, asking if they can get rid of the K+ 20meq bid, for now? Pt allergic to Humalog and Lantus. They have Humulin R, can pt use that in place of Apidra and use same S.S.? Also, can they adjust the Keppra 100mg bid to recommended dose 250-500qd, for pt's with acute renal failure? Please Advise.  [phone]

## 2021-05-25 NOTE — DISCHARGE SUMMARY
TREATMENT LOCATION:   [] [address]   [facility] [address]: [phone]  F: [phone] [x] [address]: [phone]  F: [phone]     Lymphedema Therapy Discharge Note    Date: 2021      Patient: Ariana Vance  : 1940  MRN: [id number]    Referring Physician: Breanna Guallpa       Phone: [phone]  Fax: [phone]: [employer] Medicare $30 co-pay, visits BMN  Medical Diagnosis: I89.0  Rehab Codes: I89.0     Onset Date: 21      Total visits attended: 3        Cancels/No shows: 1  Date of initial visit: 21               Date of final visit: 3/4/21        Subjective:  Refer to initial evaluation. Objective:  Refer to initial evaluation. Assessment:  Refer to initial evaluation. Treatment to Date:  [] Therapeutic Exercise      [] Therapeutic Activity       [] Instruction in Home Exercise Program                       [x] Manual Therapy  [x] Self-Care/Home Management  [] Vasopneumatic Pump             [] Other:    Discharge Status:   [] Treatment goals were met. [] Pt received maximum benefit. No further therapy indicated at this time. [] Pt to continue exercise/home instructions independently  [] Pt has not called or returned to clinic in over 90 days. [] Therapy interrupted due to:  [] Pt has 2 or more no shows/cancels, is discontinued per our policy. [] Pt has completed prescribed number of treatment sessions. [x] Other:  Pt hospitalized for AMS and BLE cellulitis with wounds. Per [facility] EMR, wound care is managing pt's care at this time. Once appropriate (wounds healed and stable), it is recommended that pt return to lymphedema clinic with new referral if BLE swelling remains unmanaged.      Functional Assessment Used: Lymphedema Life Impact Scale (LLIS) ( % of impairment) [x] Eval 77%                   Electronically signed by Ciaran Olguin OT on 2021 at 10:48 AM      If you have any questions or concerns, please don't hesitate to call. If patient would like to return to the clinic a new referral will be necessary. Thank you again for your referral and allowing us to assist in the care of this patient!

## 2021-10-20 NOTE — ED NOTES
[facility] nurse updated on pt's POC. Discussed with staff pt's discharge papers, informed to follow-up with pt's PCP.       Rock Kirby RN  10/20/21 [id number]

## 2021-10-20 NOTE — ED PROVIDER NOTES
[address]  eMERGENCY dEPARTMENT eNCOUnter      Pt Name: Radha Whyte  MRN: [id number]  [place] 1940  Date of evaluation: 10/20/21      CHIEF COMPLAINT       Chief Complaint   Patient presents with    Altered Mental Status         HISTORY OF PRESENT ILLNESS    Radha Whyte is a 79 y.o. female who presents complaining of behavior issues. Patient was sent in from the nursing home because evidently she has been highly agitated tonight biting at her fingers until he bleed picking at her scabs and evidently was being aggressive toward staff. Patient was given multiple medications that they state have not helped her at all and so they sent her in to be evaluated. Patient denies any chest pain cough vomiting belly pain or diarrhea. Patient states that she has pain in her back and in her legs which is new. Patient is currently getting IV antibiotics for a wound to her finger. REVIEW OF SYSTEMS       Review of Systems   Constitutional: Negative for activity change, appetite change, chills, diaphoresis and fever. HENT: Negative for congestion, ear pain, facial swelling, nosebleeds, rhinorrhea, sinus pressure, sore throat and trouble swallowing. Eyes: Negative for pain, discharge and redness. Respiratory: Negative for cough, chest tightness, shortness of breath and wheezing. Cardiovascular: Negative for chest pain, palpitations and leg swelling. Gastrointestinal: Negative for abdominal pain, blood in stool, constipation, diarrhea, nausea and vomiting. Genitourinary: Negative for difficulty urinating, dysuria, flank pain, frequency, genital sores and hematuria. Musculoskeletal: Positive for back pain. Negative for arthralgias, gait problem, joint swelling, myalgias and neck pain. Skin: Negative for color change, pallor, rash and wound. Neurological: Negative for dizziness, tremors, seizures, syncope, speech difficulty, weakness, numbness and headaches. Psychiatric/Behavioral: Positive for behavioral problems and confusion. Negative for decreased concentration, hallucinations, self-injury, sleep disturbance and suicidal ideas.        PAST MEDICAL HISTORY     Past Medical History:   Diagnosis Date    Acute upper respiratory infection     Arthritis 6/23/2017    Backache     Bell's palsy     Benign hypertension with CKD (chronic kidney disease) stage III ([facility] 7/15/2020    Cellulitis     Cellulitis 8/11/2020    Cellulitis of left lower extremity 7/26/2019    Chronic diastolic heart failure ([facility])     Chronic kidney disease, stage III (moderate) ([facility])     CKD (chronic kidney disease) stage 3, GFR 30-59 ml/min ([facility]) 6/15/2020    Coronary artery disease     Cough     Diabetes ([address]     Diabetes mellitus ([address]     Diarrhea 12/8/2015    H/O acute bronchitis with bronchospasm     H/O acute sinusitis     H/O allergic reaction     H/O allergic rhinitis     H/O tinea cruris     Hx of heart artery stent     heart stents x 4    Hyperlipidemia     Hypertension     Joint pain, hip     LVH (left ventricular hypertrophy)     MI (myocardial infarction) ([address]     Morbid obesity ([facility])     Otitis externa     Pulmonary hypertension ([address]     Right bundle branch block     Seizure disorder ([address]     Late effects of CVA    Subarachnoid bleed ([address]     Subarachnoid bleed ([address] march 28, 2016    Tendonitis, bicipital        SURGICAL HISTORY       Past Surgical History:   Procedure Laterality Date    CARDIAC CATHETERIZATION      heart stents x 4    CATARACT REMOVAL      Multiple surgeries    [facility]ACT [facility] IMPLANT [facility]2018    [facility]    CATARACT REMOVAL WITH IMPLANT Right 03/05/2019    [facility]    INTRACAPSULAR CATARACT EXTRACTION Right 3/5/2019    EYE CATARACT EMULSIFICATION IOL IMPLANT performed by Cory Adler MD at [address] [place] [address] Left     PACEMAKER PLACEMENT      ME XCAPSL CTRC RMVL [address] LENS PROSTH W/O ECP Left 11/1/2018    EYE CATARACT EMULSIFICATION IOL IMPLANT performed by Chetan Estevez MD at [place] [address]  12/9/15       CURRENT MEDICATIONS       Current Discharge Medication List      CONTINUE these medications which have NOT CHANGED    Details   dexamethasone (DECADRON) 6 MG tablet Take 6 mg by mouth 2 times daily (with meals)      DULoxetine (CYMBALTA) 60 MG extended release capsule Take 1 capsule by mouth daily  Qty: 30 capsule, Refills: 0    Associated Diagnoses: Depression, unspecified depression type      TRESIBA FLEXTOUCH 100 UNIT/ML SOPN Inject 60 Units into the skin nightly Patient is taking 60 units nightly  Qty: 5 pen, Refills: 0      bumetanide (BUMEX) 2 MG tablet Take 1 tablet by mouth daily  Qty: 30 tablet, Refills: 3      spironolactone (ALDACTONE) 25 MG tablet TAKE 1 TABLET DAILY  Qty: 90 tablet, Refills: 3      clopidogrel (PLAVIX) 75 MG tablet Take 1 tablet by mouth daily  Qty: 90 tablet, Refills: 3      magnesium oxide (MAG-OX) 400 MG tablet TAKE 1 TABLET BY MOUTH EVERY DAY      potassium chloride (KLOR-CON) 20 MEQ packet Take 20 mEq by mouth 2 times daily      insulin glulisine (APIDRA SOLOSTAR) 100 UNIT/ML injection pen Take 25 units with BF, 30 with lunch and 20 with supper  Qty: 15 mL, Refills: 3      levETIRAcetam (KEPPRA) 1000 MG tablet TAKE 1 TABLET TWICE A DAY  Qty: 180 tablet, Refills: 3      atorvastatin (LIPITOR) 10 MG tablet Take 1 tablet by mouth daily  Qty: 30 tablet, Refills: 3      sodium chloride (ALTAMIST SPRAY) 0.65 % nasal spray 1 spray by Nasal route 2 times daily  Qty: 1 Bottle, Refills: 3      acetaminophen (TYLENOL) 325 MG tablet Take 2 tablets by mouth every 6 hours as needed for Pain  Qty: 40 tablet, Refills: 0      Multiple Vitamins-Minerals (MULTIVITAMIN WOMEN PO) Take by mouth      calcium carbonate (OYSTER SHELL CALCIUM 500 MG) 1250 (500 Ca) MG tablet TAKE 1 TABLET BY MOUTH ONE TIME A DAY   Qty: 30 tablet, Refills: 1      Misc Natural Products (APPLE CIDER VINEGAR) TABS Take by mouth      vitamin D (ERGOCALCIFEROL) 81663 UNITS CAPS capsule TAKE 1 CAPSULE BY MOUTH ONE TIME A WEEK   Qty: 4 capsule, Refills: 2      metoprolol tartrate (LOPRESSOR) 25 MG tablet Take 0.5 tablets by mouth 2 times daily Indications: per Cardiologist's order  Qty: 90 tablet, Refills: 3      loperamide (IMODIUM) 2 MG capsule Take 1 capsule by mouth as needed for Diarrhea  Qty: 30 capsule, Refills: 1             ALLERGIES     is allergic to lantus [insulin glargine], metformin and related, prednisone, sertraline, amoxicillin, bactrim [sulfamethoxazole-trimethoprim], eggs or egg-derived products, novolog [insulin aspart], and zinc.    SOCIAL HISTORY      reports that she has never smoked. She has never used smokeless tobacco. She reports that she does not drink alcohol and does not use drugs. PHYSICAL EXAM     INITIAL VITALS: BP (!) 115/59   Pulse 87   Temp 97.7 °F (36.5 °C) (Oral)   Resp 14   Ht 5' 1\" (1.549 m)   Wt 260 lb (117.9 kg)   SpO2 97%   BMI 49.13 kg/m²      Physical Exam  Vitals and nursing note reviewed. Constitutional:       General: She is not in acute distress. Appearance: She is well-developed. She is not diaphoretic. HENT:      Head: Normocephalic and atraumatic. Eyes:      General: No scleral icterus. Right eye: No discharge. Left eye: No discharge. Conjunctiva/sclera: Conjunctivae normal.      Pupils: Pupils are equal, round, and reactive to light. Cardiovascular:      Rate and Rhythm: Normal rate and regular rhythm. Heart sounds: Normal heart sounds. No murmur heard. No friction rub. No gallop. Pulmonary:      Effort: Pulmonary effort is normal. No respiratory distress. Breath sounds: Normal breath sounds. No wheezing or rales. Chest:      Chest wall: No tenderness. Abdominal:      General: Bowel sounds are normal. There is no distension. Palpations: Abdomen is soft. There is no mass. Tenderness: There is no abdominal tenderness. There is no guarding or rebound. Musculoskeletal:         General: No tenderness. Normal range of motion. Skin:     General: Skin is warm and dry. Coloration: Skin is not pale. Findings: Lesion (Multiple skin lesions with a large wound to the left middle finger dorsal) present. No erythema or rash. Neurological:      Mental Status: She is alert and oriented to person, place, and time. Cranial Nerves: No cranial nerve deficit. Sensory: No sensory deficit. Motor: No abnormal muscle tone. Coordination: Coordination normal.      Deep Tendon Reflexes: Reflexes normal.   Psychiatric:         Behavior: Behavior normal.         Thought Content: Thought content normal.         Judgment: Judgment normal.         DIAGNOSTIC RESULTS     RADIOLOGY:All plain film, CT,MRI, and formal ultrasound images (except ED bedside ultrasound) are read by the radiologist and the interpretations are directly viewed by the emergency physician. No results found. LABS: All lab results were reviewed by myself, and all abnormals are listed below.   Labs Reviewed   CBC WITH AUTO DIFFERENTIAL - Abnormal; Notable for the following components:       Result Value    RBC 2.99 (*)     Hemoglobin 9.7 (*)     Hematocrit 28.8 (*)     RDW 15.7 (*)     Seg Neutrophils 67 (*)     Lymphocytes 22 (*)     Monocytes 9 (*)     All other components within normal limits   COMPREHENSIVE METABOLIC PANEL - Abnormal; Notable for the following components:    Glucose 169 (*)     BUN 29 (*)     CREATININE 1.29 (*)     Chloride 97 (*)     CO2 33 (*)     Total Bilirubin <0.15 (*)     GFR Non- 40 (*)     GFR  48 (*)     All other components within normal limits   POC GLUCOSE FINGERSTICK - Abnormal; Notable for the following components:    POC Glucose 151 (*)     All other components within normal limits URINE RT REFLEX TO CULTURE         MEDICAL DECISION MAKING:     We will do an altered mental status work-up and then call the PCP. EMERGENCY DEPARTMENT COURSE:   Vitals:    Vitals:    10/20/21 0334   BP: (!) 115/59   Pulse: 87   Resp: 14   Temp: 97.7 °F (36.5 °C)   TempSrc: Oral   SpO2: 97%   Weight: 260 lb (117.9 kg)   Height: 5' 1\" (1.549 m)       The patient was given the following medications while in the emergency department:  No orders of the defined types were placed in this encounter. -------------------------  4:37 AM EDT  Patient has been evaluated has no medical issues going on it sounds like this is just continued behavioral issues and I feel that she is calm she is cooperative with us and therefore she is okay to go back to the nursing home. CONSULTS:  None    PROCEDURES:  None    FINAL IMPRESSION      1.  Agitation          DISPOSITION/PLAN   DISPOSITION Decision To Discharge 10/20/2021 04:36:39 AM      PATIENT REFERREDTO:  Shannon Zuñiga MD  [address] [facility]  [phone]    In 1 week      [facility] ED  [facility] [address] [facility]  [phone]    If symptoms worsen      DISCHARGEMEDICATIONS:  Current Discharge Medication List          (Please note that portions of this note were completed with a voice recognition program.  Efforts were made to edit thedictations but occasionally words are mis-transcribed.)    Iban Sharpe MD  Attending Emergency Physician                        Iban Sharpe MD  10/20/21 [id number]

## 2021-10-20 NOTE — ED NOTES
Transport at bedside for pt transport.       Milla Darby RN  10/20/21 [address], RN  10/20/21 7500

## 2021-10-20 NOTE — ED NOTES
Attempted to call [facility] to updated staff. No answer, will attempt again.       John Otto RN  10/20/21 [id number]

## 2021-11-23 ENCOUNTER — TELEPHONE (OUTPATIENT)
Dept: PRIMARY CARE CLINIC | Age: 81
End: 2021-11-23

## 2021-11-29 ENCOUNTER — TELEPHONE (OUTPATIENT)
Dept: PRIMARY CARE CLINIC | Age: 81
End: 2021-11-29

## 2021-11-29 NOTE — TELEPHONE ENCOUNTER
Renae with [facility] called state there is an order that needs signed off on by 12/1. States the order is from 11/12 at [address] [place] [address].        Rossana Cruz [phone]

## 2024-05-17 NOTE — TELEPHONE ENCOUNTER
OV: 2/22/19, no upcoming appt. Requesting Handicap Placard renewal. [id number] [address] to give? Will  when ready, call pt. Dx: CVA, MOBILITY IMPAIRED, SEIZURE. DURATION: 5 YRS. Detail Level: Detailed Quality 47: Advance Care Plan: Advance Care Planning discussed and documented; advance care plan or surrogate decision maker documented in the medical record. Quality 110: Preventive Care And Screening: Influenza Immunization: Influenza Immunization Administered during Influenza season Quality 431: Preventive Care And Screening: Unhealthy Alcohol Use - Screening: Patient not identified as an unhealthy alcohol user when screened for unhealthy alcohol use using a systematic screening method Quality 130: Documentation Of Current Medications In The Medical Record: Current Medications Documented Quality 111:Pneumonia Vaccination Status For Older Adults: Patient received any pneumococcal conjugate or polysaccharide vaccine on or after their 60th birthday and before the end of the measurement period Quality 226: Preventive Care And Screening: Tobacco Use: Screening And Cessation Intervention: Patient screened for tobacco use and is an ex/non-smoker

## (undated) DEVICE — TOWEL,OR,DSP,ST,BLUE,STD,6/PK,12PK/CS: Brand: MEDLINE

## (undated) DEVICE — SHIELD EYE PLAS CATRCT PT CARE

## (undated) DEVICE — GOWN,AURORA,NONREINFORCED,LARGE: Brand: MEDLINE

## (undated) DEVICE — LABEL MED EYE STRL

## (undated) DEVICE — TOWEL,OR,DSP,ST,WHITE,DLX,2/PK,40PK/CS: Brand: MEDLINE

## (undated) DEVICE — THE MONARCH® "D" CARTRIDGE IS A SINGLE-USE POLYPROPYLENE CARTRIDGE FOR POSTERIOR CHAMBER IOL DELIVERY: Brand: MONARCH® III

## (undated) DEVICE — PREP SOL PVP IODINE 4%  4 OZ/BTL

## (undated) DEVICE — Z DISCONTINUED BY MEDLINE USE 2711682 TRAY SKIN PREP DRY W/ PREM GLV

## (undated) DEVICE — GLOVE SURG SZ 75 STD WHT LTX SYN POLYMER BEAD REINF ANTI RL

## (undated) DEVICE — COVER,MAYO STAND,STERILE: Brand: MEDLINE

## (undated) DEVICE — GAUZE,SPONGE,4"X4",16PLY,XRAY,STRL,LF: Brand: MEDLINE

## (undated) DEVICE — SURGICAL PROCEDURE PACK LT EYE CUST ST CHARLES STRL LF DISP

## (undated) DEVICE — SOLUTION IRRIGATION BAL SALT SOLUTION 500 ML STRL BSS